# Patient Record
Sex: MALE | Race: WHITE | NOT HISPANIC OR LATINO | Employment: OTHER | URBAN - METROPOLITAN AREA
[De-identification: names, ages, dates, MRNs, and addresses within clinical notes are randomized per-mention and may not be internally consistent; named-entity substitution may affect disease eponyms.]

---

## 2024-09-02 ENCOUNTER — HOSPITAL ENCOUNTER (EMERGENCY)
Facility: HOSPITAL | Age: 84
Discharge: OTHER INSTITUTION | End: 2024-09-03
Payer: MEDICARE

## 2024-09-02 ENCOUNTER — HOSPITAL ENCOUNTER (INPATIENT)
Facility: HOSPITAL | Age: 84
LOS: 6 days | Discharge: NURSING, REHAB OR LONG-TERM CARE | End: 2024-09-08
Attending: EMERGENCY MEDICINE | Admitting: NURSE PRACTITIONER
Payer: MEDICARE

## 2024-09-02 ENCOUNTER — APPOINTMENT (OUTPATIENT)
Dept: RADIOLOGY | Facility: HOSPITAL | Age: 84
DRG: 065 | End: 2024-09-02
Payer: MEDICARE

## 2024-09-02 ENCOUNTER — APPOINTMENT (OUTPATIENT)
Dept: RADIOLOGY | Facility: HOSPITAL | Age: 84
End: 2024-09-02
Payer: MEDICARE

## 2024-09-02 DIAGNOSIS — Z01.89 ENCOUNTER FOR OTHER SPECIFIED SPECIAL EXAMINATIONS: ICD-10-CM

## 2024-09-02 DIAGNOSIS — I63.411: Primary | ICD-10-CM

## 2024-09-02 LAB
ALBUMIN SERPL BCP-MCNC: 3.4 G/DL (ref 3.4–5)
ALP SERPL-CCNC: 104 U/L (ref 33–136)
ALT SERPL W P-5'-P-CCNC: 18 U/L (ref 10–52)
ANION GAP SERPL CALC-SCNC: 17 MMOL/L (ref 10–20)
APTT PPP: 29 SECONDS (ref 27–38)
AST SERPL W P-5'-P-CCNC: 14 U/L (ref 9–39)
BASOPHILS # BLD AUTO: 0.02 X10*3/UL (ref 0–0.1)
BASOPHILS NFR BLD AUTO: 0.2 %
BILIRUB SERPL-MCNC: 0.3 MG/DL (ref 0–1.2)
BNP SERPL-MCNC: 281 PG/ML (ref 0–99)
BUN SERPL-MCNC: 60 MG/DL (ref 6–23)
CALCIUM SERPL-MCNC: 8.8 MG/DL (ref 8.6–10.3)
CARDIAC TROPONIN I PNL SERPL HS: 143 NG/L (ref 0–20)
CARDIAC TROPONIN I PNL SERPL HS: 144 NG/L (ref 0–20)
CHLORIDE SERPL-SCNC: 108 MMOL/L (ref 98–107)
CHOLEST SERPL-MCNC: 89 MG/DL (ref 0–199)
CHOLESTEROL/HDL RATIO: 2
CO2 SERPL-SCNC: 18 MMOL/L (ref 21–32)
CREAT SERPL-MCNC: 3.07 MG/DL (ref 0.5–1.3)
EGFRCR SERPLBLD CKD-EPI 2021: 19 ML/MIN/1.73M*2
EOSINOPHIL # BLD AUTO: 0.19 X10*3/UL (ref 0–0.4)
EOSINOPHIL NFR BLD AUTO: 2.3 %
ERYTHROCYTE [DISTWIDTH] IN BLOOD BY AUTOMATED COUNT: 16.2 % (ref 11.5–14.5)
GLUCOSE BLD MANUAL STRIP-MCNC: 154 MG/DL (ref 74–99)
GLUCOSE BLD MANUAL STRIP-MCNC: 219 MG/DL (ref 74–99)
GLUCOSE SERPL-MCNC: 209 MG/DL (ref 74–99)
HCT VFR BLD AUTO: 24.5 % (ref 41–52)
HDLC SERPL-MCNC: 45.5 MG/DL
HGB BLD-MCNC: 7.7 G/DL (ref 13.5–17.5)
HOLD SPECIMEN: NORMAL
HOLD SPECIMEN: NORMAL
IMM GRANULOCYTES # BLD AUTO: 0.03 X10*3/UL (ref 0–0.5)
IMM GRANULOCYTES NFR BLD AUTO: 0.4 % (ref 0–0.9)
INR PPP: 1.1 (ref 0.9–1.1)
LDLC SERPL CALC-MCNC: 33 MG/DL
LYMPHOCYTES # BLD AUTO: 1.24 X10*3/UL (ref 0.8–3)
LYMPHOCYTES NFR BLD AUTO: 15.3 %
MCH RBC QN AUTO: 31.6 PG (ref 26–34)
MCHC RBC AUTO-ENTMCNC: 31.4 G/DL (ref 32–36)
MCV RBC AUTO: 100 FL (ref 80–100)
MONOCYTES # BLD AUTO: 0.55 X10*3/UL (ref 0.05–0.8)
MONOCYTES NFR BLD AUTO: 6.8 %
NEUTROPHILS # BLD AUTO: 6.09 X10*3/UL (ref 1.6–5.5)
NEUTROPHILS NFR BLD AUTO: 75 %
NON HDL CHOLESTEROL: 44 MG/DL (ref 0–149)
NRBC BLD-RTO: 0 /100 WBCS (ref 0–0)
PLATELET # BLD AUTO: 190 X10*3/UL (ref 150–450)
POTASSIUM SERPL-SCNC: 4.1 MMOL/L (ref 3.5–5.3)
PROT SERPL-MCNC: 5.9 G/DL (ref 6.4–8.2)
PROTHROMBIN TIME: 12.3 SECONDS (ref 9.8–12.8)
RBC # BLD AUTO: 2.44 X10*6/UL (ref 4.5–5.9)
SODIUM SERPL-SCNC: 139 MMOL/L (ref 136–145)
TRIGL SERPL-MCNC: 54 MG/DL (ref 0–149)
VLDL: 11 MG/DL (ref 0–40)
WBC # BLD AUTO: 8.1 X10*3/UL (ref 4.4–11.3)

## 2024-09-02 PROCEDURE — 70498 CT ANGIOGRAPHY NECK: CPT | Performed by: STUDENT IN AN ORGANIZED HEALTH CARE EDUCATION/TRAINING PROGRAM

## 2024-09-02 PROCEDURE — 93005 ELECTROCARDIOGRAM TRACING: CPT

## 2024-09-02 PROCEDURE — 82947 ASSAY GLUCOSE BLOOD QUANT: CPT

## 2024-09-02 PROCEDURE — 84484 ASSAY OF TROPONIN QUANT: CPT | Performed by: EMERGENCY MEDICINE

## 2024-09-02 PROCEDURE — 85025 COMPLETE CBC W/AUTO DIFF WBC: CPT | Performed by: EMERGENCY MEDICINE

## 2024-09-02 PROCEDURE — 71045 X-RAY EXAM CHEST 1 VIEW: CPT

## 2024-09-02 PROCEDURE — 80053 COMPREHEN METABOLIC PANEL: CPT | Performed by: EMERGENCY MEDICINE

## 2024-09-02 PROCEDURE — 80061 LIPID PANEL: CPT | Performed by: NURSE PRACTITIONER

## 2024-09-02 PROCEDURE — 70450 CT HEAD/BRAIN W/O DYE: CPT

## 2024-09-02 PROCEDURE — 2550000001 HC RX 255 CONTRASTS: Performed by: EMERGENCY MEDICINE

## 2024-09-02 PROCEDURE — 2060000001 HC INTERMEDIATE ICU ROOM DAILY

## 2024-09-02 PROCEDURE — 99223 1ST HOSP IP/OBS HIGH 75: CPT | Performed by: NURSE PRACTITIONER

## 2024-09-02 PROCEDURE — 36415 COLL VENOUS BLD VENIPUNCTURE: CPT | Performed by: EMERGENCY MEDICINE

## 2024-09-02 PROCEDURE — 70496 CT ANGIOGRAPHY HEAD: CPT | Performed by: STUDENT IN AN ORGANIZED HEALTH CARE EDUCATION/TRAINING PROGRAM

## 2024-09-02 PROCEDURE — 84484 ASSAY OF TROPONIN QUANT: CPT | Performed by: NURSE PRACTITIONER

## 2024-09-02 PROCEDURE — 83036 HEMOGLOBIN GLYCOSYLATED A1C: CPT | Performed by: NURSE PRACTITIONER

## 2024-09-02 PROCEDURE — 83880 ASSAY OF NATRIURETIC PEPTIDE: CPT | Performed by: NURSE PRACTITIONER

## 2024-09-02 PROCEDURE — 2500000004 HC RX 250 GENERAL PHARMACY W/ HCPCS (ALT 636 FOR OP/ED): Performed by: NURSE PRACTITIONER

## 2024-09-02 PROCEDURE — 85610 PROTHROMBIN TIME: CPT | Performed by: EMERGENCY MEDICINE

## 2024-09-02 PROCEDURE — 36415 COLL VENOUS BLD VENIPUNCTURE: CPT | Performed by: NURSE PRACTITIONER

## 2024-09-02 PROCEDURE — 85730 THROMBOPLASTIN TIME PARTIAL: CPT | Performed by: EMERGENCY MEDICINE

## 2024-09-02 PROCEDURE — 2500000004 HC RX 250 GENERAL PHARMACY W/ HCPCS (ALT 636 FOR OP/ED): Performed by: EMERGENCY MEDICINE

## 2024-09-02 PROCEDURE — 71045 X-RAY EXAM CHEST 1 VIEW: CPT | Mod: FOREIGN READ | Performed by: RADIOLOGY

## 2024-09-02 PROCEDURE — 99291 CRITICAL CARE FIRST HOUR: CPT | Performed by: EMERGENCY MEDICINE

## 2024-09-02 PROCEDURE — 70498 CT ANGIOGRAPHY NECK: CPT

## 2024-09-02 PROCEDURE — 4500999001 HC ED NO CHARGE

## 2024-09-02 RX ORDER — PANTOPRAZOLE SODIUM 40 MG/10ML
40 INJECTION, POWDER, LYOPHILIZED, FOR SOLUTION INTRAVENOUS DAILY
Status: DISCONTINUED | OUTPATIENT
Start: 2024-09-03 | End: 2024-09-08 | Stop reason: HOSPADM

## 2024-09-02 RX ORDER — DEXTROSE 50 % IN WATER (D50W) INTRAVENOUS SYRINGE
12.5
Status: DISCONTINUED | OUTPATIENT
Start: 2024-09-02 | End: 2024-09-08 | Stop reason: HOSPADM

## 2024-09-02 RX ORDER — INSULIN LISPRO 100 [IU]/ML
0-10 INJECTION, SOLUTION INTRAVENOUS; SUBCUTANEOUS EVERY 4 HOURS
Status: DISCONTINUED | OUTPATIENT
Start: 2024-09-02 | End: 2024-09-05

## 2024-09-02 RX ORDER — ATORVASTATIN CALCIUM 80 MG/1
80 TABLET, FILM COATED ORAL NIGHTLY
Status: DISCONTINUED | OUTPATIENT
Start: 2024-09-02 | End: 2024-09-08 | Stop reason: HOSPADM

## 2024-09-02 RX ORDER — TAMSULOSIN HYDROCHLORIDE 0.4 MG/1
0.4 CAPSULE ORAL DAILY
Status: CANCELLED | OUTPATIENT
Start: 2024-09-03

## 2024-09-02 RX ORDER — AMLODIPINE BESYLATE 2.5 MG/1
2.5 TABLET ORAL DAILY
Status: ON HOLD | COMMUNITY
End: 2024-09-08 | Stop reason: ALTCHOICE

## 2024-09-02 RX ORDER — ALLOPURINOL 300 MG/1
300 TABLET ORAL DAILY
Status: ON HOLD | COMMUNITY
End: 2024-09-08 | Stop reason: ALTCHOICE

## 2024-09-02 RX ORDER — HYDROCHLOROTHIAZIDE 12.5 MG/1
12.5 CAPSULE ORAL DAILY
Status: ON HOLD | COMMUNITY
End: 2024-09-08 | Stop reason: ALTCHOICE

## 2024-09-02 RX ORDER — TAMSULOSIN HYDROCHLORIDE 0.4 MG/1
0.4 CAPSULE ORAL DAILY
Status: ON HOLD | COMMUNITY

## 2024-09-02 RX ORDER — SODIUM CHLORIDE 9 MG/ML
50 INJECTION, SOLUTION INTRAVENOUS CONTINUOUS
Status: ACTIVE | OUTPATIENT
Start: 2024-09-02 | End: 2024-09-03

## 2024-09-02 RX ORDER — OLMESARTAN MEDOXOMIL 40 MG/1
40 TABLET ORAL NIGHTLY
Status: ON HOLD | COMMUNITY
End: 2024-09-08 | Stop reason: ALTCHOICE

## 2024-09-02 RX ORDER — METOPROLOL TARTRATE 1 MG/ML
5 INJECTION, SOLUTION INTRAVENOUS ONCE AS NEEDED
Status: DISCONTINUED | OUTPATIENT
Start: 2024-09-02 | End: 2024-09-08 | Stop reason: HOSPADM

## 2024-09-02 ASSESSMENT — PAIN - FUNCTIONAL ASSESSMENT
PAIN_FUNCTIONAL_ASSESSMENT: 0-10
PAIN_FUNCTIONAL_ASSESSMENT: 0-10

## 2024-09-02 ASSESSMENT — LIFESTYLE VARIABLES
HAVE PEOPLE ANNOYED YOU BY CRITICIZING YOUR DRINKING: NO
EVER HAD A DRINK FIRST THING IN THE MORNING TO STEADY YOUR NERVES TO GET RID OF A HANGOVER: NO
HAVE YOU EVER FELT YOU SHOULD CUT DOWN ON YOUR DRINKING: NO
TOTAL SCORE: 0
EVER FELT BAD OR GUILTY ABOUT YOUR DRINKING: NO

## 2024-09-02 ASSESSMENT — PAIN SCALES - GENERAL
PAINLEVEL_OUTOF10: 0 - NO PAIN
PAINLEVEL_OUTOF10: 0 - NO PAIN

## 2024-09-02 ASSESSMENT — COLUMBIA-SUICIDE SEVERITY RATING SCALE - C-SSRS
1. IN THE PAST MONTH, HAVE YOU WISHED YOU WERE DEAD OR WISHED YOU COULD GO TO SLEEP AND NOT WAKE UP?: NO
2. HAVE YOU ACTUALLY HAD ANY THOUGHTS OF KILLING YOURSELF?: NO
6. HAVE YOU EVER DONE ANYTHING, STARTED TO DO ANYTHING, OR PREPARED TO DO ANYTHING TO END YOUR LIFE?: NO

## 2024-09-03 ENCOUNTER — APPOINTMENT (OUTPATIENT)
Dept: CARDIOLOGY | Facility: HOSPITAL | Age: 84
DRG: 065 | End: 2024-09-03
Payer: MEDICARE

## 2024-09-03 ENCOUNTER — APPOINTMENT (OUTPATIENT)
Dept: RADIOLOGY | Facility: HOSPITAL | Age: 84
DRG: 065 | End: 2024-09-03
Payer: MEDICARE

## 2024-09-03 ENCOUNTER — APPOINTMENT (OUTPATIENT)
Dept: CARDIOLOGY | Facility: HOSPITAL | Age: 84
End: 2024-09-03
Payer: MEDICARE

## 2024-09-03 LAB
AMORPH CRY #/AREA UR COMP ASSIST: ABNORMAL /HPF
ANION GAP SERPL CALC-SCNC: 13 MMOL/L (ref 10–20)
AORTIC VALVE MEAN GRADIENT: 5 MMHG
AORTIC VALVE PEAK VELOCITY: 1.39 M/S
APPEARANCE UR: ABNORMAL
ATRIAL RATE: 0 BPM
AV PEAK GRADIENT: 7.7 MMHG
AVA (PEAK VEL): 1.55 CM2
AVA (VTI): 1.59 CM2
BACTERIA #/AREA URNS AUTO: ABNORMAL /HPF
BILIRUB UR STRIP.AUTO-MCNC: NEGATIVE MG/DL
BUN SERPL-MCNC: 57 MG/DL (ref 6–23)
CALCIUM SERPL-MCNC: 9 MG/DL (ref 8.6–10.3)
CARDIAC TROPONIN I PNL SERPL HS: 139 NG/L (ref 0–20)
CHLORIDE SERPL-SCNC: 111 MMOL/L (ref 98–107)
CO2 SERPL-SCNC: 20 MMOL/L (ref 21–32)
COLOR UR: COLORLESS
CREAT SERPL-MCNC: 2.67 MG/DL (ref 0.5–1.3)
EGFRCR SERPLBLD CKD-EPI 2021: 23 ML/MIN/1.73M*2
EJECTION FRACTION APICAL 4 CHAMBER: 43.6
EJECTION FRACTION: 68 %
ERYTHROCYTE [DISTWIDTH] IN BLOOD BY AUTOMATED COUNT: 16.1 % (ref 11.5–14.5)
EST. AVERAGE GLUCOSE BLD GHB EST-MCNC: 117 MG/DL
GLUCOSE BLD MANUAL STRIP-MCNC: 120 MG/DL (ref 74–99)
GLUCOSE BLD MANUAL STRIP-MCNC: 122 MG/DL (ref 74–99)
GLUCOSE BLD MANUAL STRIP-MCNC: 124 MG/DL (ref 74–99)
GLUCOSE BLD MANUAL STRIP-MCNC: 125 MG/DL (ref 74–99)
GLUCOSE BLD MANUAL STRIP-MCNC: 126 MG/DL (ref 74–99)
GLUCOSE BLD MANUAL STRIP-MCNC: 148 MG/DL (ref 74–99)
GLUCOSE BLD MANUAL STRIP-MCNC: 152 MG/DL (ref 74–99)
GLUCOSE SERPL-MCNC: 125 MG/DL (ref 74–99)
GLUCOSE UR STRIP.AUTO-MCNC: NORMAL MG/DL
HBA1C MFR BLD: 5.7 %
HCT VFR BLD AUTO: 24.8 % (ref 41–52)
HGB BLD-MCNC: 7.9 G/DL (ref 13.5–17.5)
HOLD SPECIMEN: NORMAL
KETONES UR STRIP.AUTO-MCNC: NEGATIVE MG/DL
LEFT ATRIUM VOLUME AREA LENGTH INDEX BSA: 45.9 ML/M2
LEFT VENTRICLE INTERNAL DIMENSION DIASTOLE: 4.3 CM (ref 3.5–6)
LEFT VENTRICULAR OUTFLOW TRACT DIAMETER: 1.8 CM
LEUKOCYTE ESTERASE UR QL STRIP.AUTO: ABNORMAL
MCH RBC QN AUTO: 31.7 PG (ref 26–34)
MCHC RBC AUTO-ENTMCNC: 31.9 G/DL (ref 32–36)
MCV RBC AUTO: 100 FL (ref 80–100)
MUCOUS THREADS #/AREA URNS AUTO: ABNORMAL /LPF
NITRITE UR QL STRIP.AUTO: NEGATIVE
NRBC BLD-RTO: 0 /100 WBCS (ref 0–0)
PH UR STRIP.AUTO: 5.5 [PH]
PLATELET # BLD AUTO: 193 X10*3/UL (ref 150–450)
POTASSIUM SERPL-SCNC: 4.2 MMOL/L (ref 3.5–5.3)
PR INTERVAL: 153 MS
PROT UR STRIP.AUTO-MCNC: ABNORMAL MG/DL
Q ONSET: 249 MS
QRS COUNT: 11 BEATS
QRS DURATION: 116 MS
QT INTERVAL: 442 MS
QTC CALCULATION(BAZETT): 474 MS
QTC FREDERICIA: 463 MS
R AXIS: -85 DEGREES
RBC # BLD AUTO: 2.49 X10*6/UL (ref 4.5–5.9)
RBC # UR STRIP.AUTO: ABNORMAL /UL
RBC #/AREA URNS AUTO: >20 /HPF
RIGHT VENTRICLE FREE WALL PEAK S': 7.18 CM/S
RIGHT VENTRICLE PEAK SYSTOLIC PRESSURE: 36.7 MMHG
SODIUM SERPL-SCNC: 140 MMOL/L (ref 136–145)
SP GR UR STRIP.AUTO: 1.02
T AXIS: 98 DEGREES
T OFFSET: 470 MS
TRICUSPID ANNULAR PLANE SYSTOLIC EXCURSION: 1.4 CM
UROBILINOGEN UR STRIP.AUTO-MCNC: NORMAL MG/DL
VENTRICULAR RATE: 69 BPM
WBC # BLD AUTO: 8.4 X10*3/UL (ref 4.4–11.3)
WBC #/AREA URNS AUTO: >50 /HPF
WBC CLUMPS #/AREA URNS AUTO: ABNORMAL /HPF

## 2024-09-03 PROCEDURE — 97161 PT EVAL LOW COMPLEX 20 MIN: CPT | Mod: GP

## 2024-09-03 PROCEDURE — 36415 COLL VENOUS BLD VENIPUNCTURE: CPT | Performed by: INTERNAL MEDICINE

## 2024-09-03 PROCEDURE — 80048 BASIC METABOLIC PNL TOTAL CA: CPT | Performed by: INTERNAL MEDICINE

## 2024-09-03 PROCEDURE — 93306 TTE W/DOPPLER COMPLETE: CPT

## 2024-09-03 PROCEDURE — 99223 1ST HOSP IP/OBS HIGH 75: CPT | Performed by: PSYCHIATRY & NEUROLOGY

## 2024-09-03 PROCEDURE — 2500000001 HC RX 250 WO HCPCS SELF ADMINISTERED DRUGS (ALT 637 FOR MEDICARE OP): Performed by: PSYCHIATRY & NEUROLOGY

## 2024-09-03 PROCEDURE — 97165 OT EVAL LOW COMPLEX 30 MIN: CPT | Mod: GO

## 2024-09-03 PROCEDURE — 81001 URINALYSIS AUTO W/SCOPE: CPT | Performed by: NURSE PRACTITIONER

## 2024-09-03 PROCEDURE — 84484 ASSAY OF TROPONIN QUANT: CPT | Performed by: NURSE PRACTITIONER

## 2024-09-03 PROCEDURE — 85027 COMPLETE CBC AUTOMATED: CPT | Performed by: INTERNAL MEDICINE

## 2024-09-03 PROCEDURE — 70551 MRI BRAIN STEM W/O DYE: CPT | Performed by: RADIOLOGY

## 2024-09-03 PROCEDURE — 2500000004 HC RX 250 GENERAL PHARMACY W/ HCPCS (ALT 636 FOR OP/ED): Performed by: NURSE PRACTITIONER

## 2024-09-03 PROCEDURE — 70551 MRI BRAIN STEM W/O DYE: CPT

## 2024-09-03 PROCEDURE — 51701 INSERT BLADDER CATHETER: CPT

## 2024-09-03 PROCEDURE — 82947 ASSAY GLUCOSE BLOOD QUANT: CPT

## 2024-09-03 PROCEDURE — 2500000002 HC RX 250 W HCPCS SELF ADMINISTERED DRUGS (ALT 637 FOR MEDICARE OP, ALT 636 FOR OP/ED): Performed by: NURSE PRACTITIONER

## 2024-09-03 PROCEDURE — 99222 1ST HOSP IP/OBS MODERATE 55: CPT

## 2024-09-03 PROCEDURE — 2500000004 HC RX 250 GENERAL PHARMACY W/ HCPCS (ALT 636 FOR OP/ED)

## 2024-09-03 PROCEDURE — 93306 TTE W/DOPPLER COMPLETE: CPT | Performed by: STUDENT IN AN ORGANIZED HEALTH CARE EDUCATION/TRAINING PROGRAM

## 2024-09-03 PROCEDURE — 36415 COLL VENOUS BLD VENIPUNCTURE: CPT | Performed by: NURSE PRACTITIONER

## 2024-09-03 PROCEDURE — 2060000001 HC INTERMEDIATE ICU ROOM DAILY

## 2024-09-03 PROCEDURE — 92610 EVALUATE SWALLOWING FUNCTION: CPT | Mod: GN | Performed by: SPEECH-LANGUAGE PATHOLOGIST

## 2024-09-03 RX ORDER — ASPIRIN 300 MG/1
150 SUPPOSITORY RECTAL DAILY
Status: DISCONTINUED | OUTPATIENT
Start: 2024-09-03 | End: 2024-09-05

## 2024-09-03 RX ORDER — TAMSULOSIN HYDROCHLORIDE 0.4 MG/1
0.4 CAPSULE ORAL DAILY
Status: DISCONTINUED | OUTPATIENT
Start: 2024-09-03 | End: 2024-09-08 | Stop reason: HOSPADM

## 2024-09-03 RX ORDER — SODIUM CHLORIDE 9 MG/ML
50 INJECTION, SOLUTION INTRAVENOUS CONTINUOUS
Status: DISCONTINUED | OUTPATIENT
Start: 2024-09-03 | End: 2024-09-05

## 2024-09-03 RX ORDER — CEFTRIAXONE 1 G/50ML
1 INJECTION, SOLUTION INTRAVENOUS EVERY 24 HOURS
Status: DISCONTINUED | OUTPATIENT
Start: 2024-09-03 | End: 2024-09-08 | Stop reason: HOSPADM

## 2024-09-03 RX ORDER — ACETAMINOPHEN 650 MG/1
650 SUPPOSITORY RECTAL EVERY 6 HOURS PRN
Status: DISCONTINUED | OUTPATIENT
Start: 2024-09-03 | End: 2024-09-08 | Stop reason: HOSPADM

## 2024-09-03 SDOH — SOCIAL STABILITY: SOCIAL INSECURITY: WERE YOU ABLE TO COMPLETE ALL THE BEHAVIORAL HEALTH SCREENINGS?: YES

## 2024-09-03 SDOH — SOCIAL STABILITY: SOCIAL INSECURITY: ABUSE: ADULT

## 2024-09-03 SDOH — SOCIAL STABILITY: SOCIAL INSECURITY: HAVE YOU HAD ANY THOUGHTS OF HARMING ANYONE ELSE?: UNABLE TO ASSESS

## 2024-09-03 SDOH — SOCIAL STABILITY: SOCIAL INSECURITY: HAS ANYONE EVER THREATENED TO HURT YOUR FAMILY OR YOUR PETS?: UNABLE TO ASSESS

## 2024-09-03 SDOH — SOCIAL STABILITY: SOCIAL INSECURITY: DOES ANYONE TRY TO KEEP YOU FROM HAVING/CONTACTING OTHER FRIENDS OR DOING THINGS OUTSIDE YOUR HOME?: UNABLE TO ASSESS

## 2024-09-03 SDOH — SOCIAL STABILITY: SOCIAL INSECURITY: HAVE YOU HAD THOUGHTS OF HARMING ANYONE ELSE?: UNABLE TO ASSESS

## 2024-09-03 SDOH — SOCIAL STABILITY: SOCIAL INSECURITY: DO YOU FEEL ANYONE HAS EXPLOITED OR TAKEN ADVANTAGE OF YOU FINANCIALLY OR OF YOUR PERSONAL PROPERTY?: UNABLE TO ASSESS

## 2024-09-03 SDOH — SOCIAL STABILITY: SOCIAL INSECURITY: DO YOU FEEL UNSAFE GOING BACK TO THE PLACE WHERE YOU ARE LIVING?: UNABLE TO ASSESS

## 2024-09-03 SDOH — SOCIAL STABILITY: SOCIAL INSECURITY: ARE YOU OR HAVE YOU BEEN THREATENED OR ABUSED PHYSICALLY, EMOTIONALLY, OR SEXUALLY BY ANYONE?: UNABLE TO ASSESS

## 2024-09-03 SDOH — SOCIAL STABILITY: SOCIAL INSECURITY: ARE THERE ANY APPARENT SIGNS OF INJURIES/BEHAVIORS THAT COULD BE RELATED TO ABUSE/NEGLECT?: UNABLE TO ASSESS

## 2024-09-03 ASSESSMENT — ACTIVITIES OF DAILY LIVING (ADL)
DRESSING YOURSELF: DEPENDENT
LACK_OF_TRANSPORTATION: NO
HEARING - RIGHT EAR: FUNCTIONAL
TOILETING: DEPENDENT
FEEDING YOURSELF: DEPENDENT
BATHING: DEPENDENT
HEARING - LEFT EAR: FUNCTIONAL
ASSISTIVE_DEVICE: WALKER;DENTURES UPPER
WALKS IN HOME: DEPENDENT
ADEQUATE_TO_COMPLETE_ADL: YES
GROOMING: DEPENDENT
JUDGMENT_ADEQUATE_SAFELY_COMPLETE_DAILY_ACTIVITIES: NO
PATIENT'S MEMORY ADEQUATE TO SAFELY COMPLETE DAILY ACTIVITIES?: NO

## 2024-09-03 ASSESSMENT — COGNITIVE AND FUNCTIONAL STATUS - GENERAL
PERSONAL GROOMING: A LOT
STANDING UP FROM CHAIR USING ARMS: A LOT
TURNING FROM BACK TO SIDE WHILE IN FLAT BAD: TOTAL
HELP NEEDED FOR BATHING: TOTAL
PATIENT BASELINE BEDBOUND: NO
HELP NEEDED FOR BATHING: TOTAL
MOBILITY SCORE: 9
CLIMB 3 TO 5 STEPS WITH RAILING: TOTAL
STANDING UP FROM CHAIR USING ARMS: TOTAL
PERSONAL GROOMING: TOTAL
MOVING TO AND FROM BED TO CHAIR: TOTAL
STANDING UP FROM CHAIR USING ARMS: TOTAL
DRESSING REGULAR LOWER BODY CLOTHING: TOTAL
DRESSING REGULAR LOWER BODY CLOTHING: TOTAL
TURNING FROM BACK TO SIDE WHILE IN FLAT BAD: TOTAL
DRESSING REGULAR UPPER BODY CLOTHING: TOTAL
CLIMB 3 TO 5 STEPS WITH RAILING: TOTAL
DRESSING REGULAR UPPER BODY CLOTHING: TOTAL
TOILETING: TOTAL
PERSONAL GROOMING: TOTAL
EATING MEALS: TOTAL
DRESSING REGULAR LOWER BODY CLOTHING: TOTAL
EATING MEALS: TOTAL
MOBILITY SCORE: 7
TOILETING: TOTAL
CLIMB 3 TO 5 STEPS WITH RAILING: TOTAL
MOVING FROM LYING ON BACK TO SITTING ON SIDE OF FLAT BED WITH BEDRAILS: A LOT
WALKING IN HOSPITAL ROOM: TOTAL
MOBILITY SCORE: 9
TURNING FROM BACK TO SIDE WHILE IN FLAT BAD: A LOT
MOVING FROM LYING ON BACK TO SITTING ON SIDE OF FLAT BED WITH BEDRAILS: A LOT
MOVING TO AND FROM BED TO CHAIR: TOTAL
WALKING IN HOSPITAL ROOM: TOTAL
TOILETING: TOTAL
DAILY ACTIVITIY SCORE: 8
MOVING TO AND FROM BED TO CHAIR: TOTAL
WALKING IN HOSPITAL ROOM: TOTAL
DRESSING REGULAR UPPER BODY CLOTHING: TOTAL
DAILY ACTIVITIY SCORE: 6
DAILY ACTIVITIY SCORE: 6
HELP NEEDED FOR BATHING: TOTAL
EATING MEALS: A LOT

## 2024-09-03 ASSESSMENT — LIFESTYLE VARIABLES
HOW MANY STANDARD DRINKS CONTAINING ALCOHOL DO YOU HAVE ON A TYPICAL DAY: PATIENT DOES NOT DRINK
AUDIT-C TOTAL SCORE: 0
SKIP TO QUESTIONS 9-10: 1
AUDIT-C TOTAL SCORE: 0
HOW OFTEN DO YOU HAVE A DRINK CONTAINING ALCOHOL: NEVER
HOW OFTEN DO YOU HAVE 6 OR MORE DRINKS ON ONE OCCASION: NEVER

## 2024-09-03 ASSESSMENT — PATIENT HEALTH QUESTIONNAIRE - PHQ9
2. FEELING DOWN, DEPRESSED OR HOPELESS: NOT AT ALL
1. LITTLE INTEREST OR PLEASURE IN DOING THINGS: NOT AT ALL
SUM OF ALL RESPONSES TO PHQ9 QUESTIONS 1 & 2: 0

## 2024-09-03 ASSESSMENT — ENCOUNTER SYMPTOMS
SPEECH DIFFICULTY: 1
WEAKNESS: 1

## 2024-09-03 ASSESSMENT — PAIN SCALES - GENERAL
PAINLEVEL_OUTOF10: 0 - NO PAIN

## 2024-09-03 ASSESSMENT — PAIN - FUNCTIONAL ASSESSMENT
PAIN_FUNCTIONAL_ASSESSMENT: 0-10

## 2024-09-03 NOTE — ED NOTES
Pt flight cancelled due to interventionalist stating he was no longer a candidate  Too high risk     Cornelio Ramos RN  09/02/24 [id number]

## 2024-09-03 NOTE — CONSULTS
Inpatient consult to Gastroenterology  Consult performed by: Gurwinder Yeung DO  Consult ordered by: Laly Rodriguez DO  Reason for consult: Recent GI bleed, need to start antiplatelet versus anticoagulation      Reason For Consult  Recent GI bleed, needs to start antiplatelet versus anticoagulation    History Of Present Illness  Regulo Marques is a 83 y.o. male presenting with strokelike symptoms.    CARLO Marques is a 80 year old Male with a past medical history of A-fib, colon cancer s/p colectomy, multiple small bowel obstructions, prostate cancer, unresectable rectal cancer s/p chemo and radiation therapy, diverticulitis, diabetes, hypertension, gout who presents to [facility] ED with strokelike symptoms.  Of note, he was admitted to the [facility] ED 8/12/2024 for with an upper GI bleed with hemoglobin of 6.1 requiring EGD and multiple transfusions.  Because of this he was taken off his Eliquis.  Follows with Dr. ALEXUS MARINELLI for outpatient GI.    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  Last colonoscopy 2014 shows Transverse colon polyp with tubular adenoma  Descending colon polyps with colonic mucosa with lymphoid aggregate and hyperplastic changes     Past Medical History  He has a past medical history of Diverticulitis of intestine, part unspecified, without perforation or abscess without bleeding, Personal history of malignant neoplasm, unspecified, Personal history of other diseases of the circulatory system, Personal history of other diseases of the digestive system, Personal history of other endocrine, nutritional and metabolic disease, Personal history of other malignant neoplasm of large intestine, and Personal history of other specified conditions.    Surgical History  He has a past surgical history that includes Colonoscopy (10/20/2016); Total hip arthroplasty (10/20/2016); Tonsillectomy (10/20/2016); Other surgical history (11/08/2019); and Other  surgical history (11/08/2019).     Social History  He reports that he has quit smoking. His smoking use included cigarettes. He has never used smokeless tobacco. He reports that he does not drink alcohol and does not use drugs.    Family History  No family history on file.     Allergies  Codeine    Review of Systems  See HPI     Physical Exam  Gen: Not in acute distress  Head/Neck: Atraumatic  Eyes: Anicteric sclerae, noninjected conjunctivae  Nose: No rhinorrhea  Mouth:  MMM  Heart: Regular rate and rhythm w/ no murmurs, rubs, gallops  Lungs: CTAB w/o wheezes, rales, rhonchi, no increased work of breathing  Abdomen: Soft, nontender and nondistended  Musculoskeletal: No deformities  Extremities: No edema.  Neurologic: Acute stroke  Skin: No rashes noted  Psychological: Calm       Last Recorded Vitals  /58   Pulse 57   Temp 36.1 °C (97 °F)   Resp 16   Wt 93.2 kg (205 lb 7.5 oz)   SpO2 98%        Assessment/Plan   Regulo Marques is a 80 year old Male with a past medical history of A-fib, colon cancer s/p colectomy, multiple small bowel obstructions, prostate cancer, unresectable rectal cancer s/p chemo and radiation therapy, diverticulitis, diabetes, hypertension, gout who presents to [facility] ED with strokelike symptoms.  Of note, he was admitted to the [facility] ED 8/12/2024 for with an upper GI bleed with hemoglobin of 6.1 requiring EGD and multiple transfusions.  Because of this he was taken off his Eliquis.  GI team was consulted regarding restarting his antiplatelet and anticoagulation therapy in setting of recent GI bleed.    Acute CVA  Suspected recent upper GI bleed 8/12/2024  Hankins's esophagus  Gastritis with no focal ulcers  -Patient's Eliquis was stopped 8/12/2024 in setting of recent upper GI bleed requiring multiple transfusions.  Endoscopy during admission showed Hankins's esophagus and gastritis with no focal ulcers.  -Per medical note on 8/12/2024, patient was being considered for Watchman device  outpatient  -In setting of CVA this admission and recent GI bleed with hemoglobin at 7.7 with baseline around 15, will hold anticoagulation but continue with aspirin.  -Discussed with neurology consult risks versus benefits of restarting anticoagulation given patient's suspect recent GI bleed with no source and recent stroke this admission.  Okay to restart anticoagulation as neurology consult sees fit to reduce risk of new stroke.  -Continue PPI 40 mg twice daily    Dr. Gurwinder Yeung   Internal Medicine PGY-1  Available on Pure[employer] for any questions.    This is a preliminary note pending signature from the attending physician.

## 2024-09-03 NOTE — PROGRESS NOTES
Speech-Language Pathology    SLP Adult Inpatient Speech-Language Pathology Clinical Swallow Evaluation    Patient Name: Regulo Marques  MRN: [id number]  Today's Date: 9/3/2024   Time Calculation  Start Time: 0930  Stop Time: 0946  Time Calculation (min): 16 min     Current Problem:   1. 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   Liquid Diet Recommendations: NPO, alternative form of hydration.   Medication Administration Recommendations: Non Oral    Assessment:  Pt presents with mild oral dysphagia, and suspected profound pharyngeal dysphagia given clinical bedside indicators.   Wet vocal quality noted in informal conversation, with severe dysarthric speech.     ORAL PHASE: On oral motor exam, pt has no upper teeth (full upper dentures at bedside), has lower natural dentition.   Saliva contained in mouth, not drooling.   Tongue protrudes to the left.   Labial and lingual ROM/agility is impaired.    Gag reflex absent.      PHARYNGEAL PHASE: Pt reports that he is having trouble swallowing saliva, he does make a strong effort to swallow when he needs to or on command. Laryngeal rise is sluggish vs absent on palpation. Pt does have a strong coughing jag with just thin liquids from moist toothette.   Pt remains 94% room air during session.     Deferred further PO trials given the severity of dysphagia at bedside.   Will plan to re-assess in the next 24 hours, with perhaps spontaneous recovery if any, and determine if [facility].      Dysphagia Goals:  start date 9/3  Patient will participate in re-assessment of swallowing with SLP for possible diet advancement from NPO as indicated.   When appropriate,  patient will participate in Modified Barium Swallow (MBS) study due to oral dysphagia and/or suspected pharyngeal dysphagia.      Pt/caregiver education.     Plan:  Treatment/Interventions: Complete MBSS, Diet recommendations, Assess diet tolerance, Bolus trials, Oral motor exercises, Pharyngeal exercises, Patient/family education  SLP Plan: Skilled SLP  SLP Frequency: 3x per week  Duration: 2 weeks  SLP Discharge Recommendations: Continue skilled speech therapy services post discharge (see subsequent notes for any changes.)  Discussed POC: Patient, Nursing  Discussed Risks/Benefits: Yes, Patient, Nursing  Patient/Caregiver Agreeable: Yes (pt feels that he cannot swallow well.)    Subjective   Pt seen bedside, this SLP and student ST reposition pt fully upright. He is pleasant, cooperative, following commands, makes eye contact, severe dysarthria.  Pt remains 94% room air throughout this session.   MRI pending.     Per H&P  Regulo Marques is a 83 y.o. male presenting to the ER with acute onset of left sided weakness, expressive aphasia, dysarthria, mental status change, and slurred speech. Pt. Was out to dinner with his wife tonight when these symptoms developed. He was apparently in his normal state of health prior to dinner. He was taking Eliquis for A-Fib but was taken off of the Eliquis on 8/12/2024 2/2 an admission at [facility] for a GI bleed. Pt. With no prior hx. Of stroke.   Patient was initially to be transferred to [facility] however neurosurgery at [facility] deemed patient to be too unstable for airlift transport. Patient admitted to SDU under the care of Dr. Marcos Adam who will continue to follow.     CT brain 9/2  IMPRESSION:  Acute complete occlusion involving the right M2 inferior division branch. It transiently reconstitutes but then again occludes at the M3 branch point and there is a slight paucity of M4 cortical opacification involving its territory throughout the right parietal and posterior temporal regions.       Otherwise, no hemodynamically significant intracranial or  extracranial stenosis, occlusion, or aneurysm.      Suspect a early loss in the gray-white differentiation of the insula.      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      Moderate-sized left pleural effusion.    CXR 9/2  IMPRESSION:  No acute pulmonary pathology.    General Visit Information:  Patient Class: Inpatient  Living Environment:  (home with wife)  Caregiver Feedback:  (dysarthric, lying on his side for comfort.)  Reason for Referral:  (assess oropharyngeal swallow.)  Prior to Session Communication: Bedside nurse  Base[employer] Diet:  (regular textures, thin liquids at home.)  Current Diet : NPO  Dysphagia Diagnosis: Mild to moderate oral stage dysphagia (suspected profound pharyngeal dysphagia given clinical bedside indicators.)  Baseline Assessment:  Respiratory Status: Room air  Behavior/Cognition: Alert, Cooperative, Pleasant mood  Patient Positioning: Upright in Bed  Baseline Vocal Quality: Wet (audible upper airway secretions, pt reports he is having trouble swallowing saliva.  Amy was hooked up at head of bed during this assessment for pt use.)  Volitional Swallow: Absent  Pain:  Pain Assessment: 0-10  0-10 (Numeric) Pain Score: 0 - No pain   Oral/Motor Assessment:  Dentition: Dentures (Upper Full)  Oral Motor: Impaired Function  Labial Agility: Reduced  Labial ROM: Reduced left  Lingual Agility: Reduced  Lingual ROM:  (poor coordination of lingual movements noted in severe dysarthric speech)  Lingual Symmetry: Abnormal symmetry left  Vocal Quality:  (wet on secretions.)  Gag: Reduced  Intelligibility: Intelligibility reduced  Breath Support: Adequate for speech  Clinical Observations:  Patient Positioning: Upright in Bed  Management of Oral Secretions: Inadequate (amy  utilized, left at bedside for ongoing use.)  Was The 3 oz Swallow Protocol Completed: No (too high risk for aspiration, deferred.)    Inpatient:  Education Documentation  SLP has provided pt and caregiver/RN with the results and recommendations of this session.  Head of bed sign posted in room with NPO recs and amy use to manage secretions/saliva.

## 2024-09-03 NOTE — PROGRESS NOTES
Physical Therapy    Physical Therapy Evaluation    Patient Name: Regulo Marques  MRN: [id number]  Today's Date: 9/3/2024   Time Calculation  Start Time: 1031  Stop Time: 1055  Time Calculation (min): 24 min  824/824-A    Assessment/Plan   PT Assessment  End of Session Communication: Bedside nurse  End of Session Patient Position: Bed, 2 rail up, Alarm on (All needs in reach, no complaints noted, family present)  IP OR SWING BED PT PLAN  Inpatient or Swing Bed: Inpatient  PT Plan  Treatment/Interventions: Bed mobility, Transfer training, Gait training  PT Plan: Ongoing PT  PT Frequency: 5 times per week  PT Discharge Recommendations: High intensity level of continued care  PT - OK to Discharge: Yes    Subjective     Current Problem:  1. 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 male presenting to the ER with acute onset of left sided weakness, expressive aphasia, dysarthria, mental status change, and slurred speech. Pt. Was out to dinner with his wife tonight when these symptoms developed. He was apparently in his normal state of health prior to dinner. He was taking Eliquis for A-Fib but was taken off of the Eliquis on 8/12/2024 2/2 an admission at [facility] for a GI bleed. Pt. With no prior hx. Of stroke.  Co-Treatment: OT  Co-Treatment Reason: maximize safety and functional mobility  Prior to Session Communication: Bedside nurse  Patient Position Received: Bed, 2 rail up, Alarm on (Agreeable to PT, pt's wife and son present during session)    Home Living:  Home Living  Home Living Comments: Pt lives with his wife in a 2 story house with 1  step through side door or 3 steps with HR through front door, 3-in-1 commode on 1st floor, and full flight with HR to bedroom and only bathroom. Bathroom has a tub/shower with a chair and HHSH and a standard height toilet. Pt sleeps in regular bed. (Pt and his wife are moving into an ILF and are planning to move in ASAP.)    Prior Level of Function:  Prior Function Per Pt/Caregiver Report  Level of [place]: Independent with ADLs and functional transfers, Independent with homemaking with ambulation (Pt amb without an AD, is independent with laundry, eat out for meals, (+) driving)    Precautions:  Precautions  Precautions Comment: Fall precautions, L neglect    Objective     Pain:  Pain Assessment  Pain Assessment:  (low back 2/10, repositioned for comfort s/p session)    Cognition:  Cognition  Overall Cognitive Status: Within Functional Limits (pt presenting with dysarthria, delayed processing, requring max VCs for all tasks, L neglect)    General Assessments:  Sensation  Light Touch: No apparent deficits  Strength  Strength Comments: B LE ROM WFL, R LE strength 4-/5, L LE strength 3-/5   Coordination  Coordination Comment: Pt demonstrating L UE and LE neglect UE>LE  Static Standing Balance  Static Standing-Comment/Number of Minutes: Poor with RW and max A x 1 demonstrating severe L lean x 1 min    Functional Assessments:  Bed Mobility  Bed Mobility:  (supine to sitting: max A x 2, sit to supine: dep x 2)  Transfers  Transfer:  (STS from EOB: mod A x 2 and max A x 1 to maintain with severe L lean noted which was unable to correct with cueing)  Ambulation/Gait Training  Ambulation/Gait Training Performed:  (unable to attempt at this time due to high fall risk)    Outcome Measures:  [facility] Basic Mobility  Turning from your back to your side while in a flat bed without using bedrails: A lot  Moving from lying on your back to sitting on the side of a flat bed without using bedrails: A lot  Moving to and from bed to  chair (including a wheelchair): Total  Standing up from a chair using your arms (e.g. wheelchair or bedside chair): A lot  To walk in hospital room: Total  Climbing 3-5 steps with railing: Total  Basic Mobility - Total Score: 9     Goals:  Encounter Problems       Encounter Problems (Active)       PT Problem       STG - Pt will transition supine <> sitting with mod A x 1  (Progressing)       Start:  09/03/24    Expected End:  09/17/24            STG - Pt will transfer STS with mod A x 1  (Progressing)       Start:  09/03/24    Expected End:  09/17/24            STG - Pt will amb 15' using RW with min-mod A x 2  (Progressing)       Start:  09/03/24    Expected End:  09/17/24                 Education Documentation  Precautions, taught by Lucia Johnson PT at 9/3/2024 12:40 PM.  Learner: Patient  Readiness: Acceptance  Method: Explanation  Response: Verbalizes Understanding, Needs Reinforcement    Mobility Training, taught by Lucia Johnson PT at 9/3/2024 12:40 PM.  Learner: Patient  Readiness: Acceptance  Method: Explanation  Response: Verbalizes Understanding, Needs Reinforcement    Education Comments  No comments found.

## 2024-09-03 NOTE — CARE PLAN
Problem: Fall/Injury  Goal: Not fall by end of shift  Outcome: Progressing  Goal: Be free from injury by end of the shift  Outcome: Progressing  Goal: Verbalize understanding of personal risk factors for fall in the hospital  Outcome: Progressing  Goal: Verbalize understanding of risk factor reduction measures to prevent injury from fall in the home  Outcome: Progressing  Goal: Use assistive devices by end of the shift  Outcome: Progressing  Goal: Pace activities to prevent fatigue by end of the shift  Outcome: Progressing   The patient's goals for the shift include      The clinical goals for the shift include Improve Neuro status

## 2024-09-03 NOTE — PROGRESS NOTES
09/03/24 1323   Discharge Planning   Living Arrangements Spouse/significant other   Support Systems Spouse/significant other;Children   Assistance Needed Independent, uses rollator as needed. Patient does drive   Type of Residence Private residence  (2 story home, bedroom upstairs)   Number of Stairs to Enter Residence 3  (3 steps to enter front door, one step to enter into side door then 3 more to get into house)   Number of Stairs Within Residence 14   Home or Post Acute Services Post acute facilities (Rehab/SNF/etc)     Met with patient, patients wife, patients daughter and patients son at bedside. Introduced self and role on care transitions team. Admission assessment completed with patients wife. Address, insurance and contact information verified. Patient lives at home with his wife and their grandson has been staying with them. Patient wife states patient and herself are moving to [facility] in [place] Independent living, apartment will be on one floor, no set move in date yet.   PCP: Dr. Paul Casarez, last visit was one week ago  Pharmacy: [facility], patient able to afford and obtain his home medications.     Addendum 1407: [facility] PT: 9 OT:8, with recommendations for high intensity therapy. Met with patients wife and family at bedside to follow up on discharge plan. Spoke with patient and family regarding acute rehab. Patients wife's preference is [facility].Spoke with Isabelle with [facility], requested for referral to be sent.

## 2024-09-03 NOTE — CONSULTS
Inpatient consult to Neurology  Consult performed by: Laly Rodriguez DO  Consult ordered by: TENZIN Molina-CNP          History Of Present Illness  Regulo Marques is a 83 y.o. male with a history of a. Fib previously on Eliquis, recent GI bleed without source found, HTN, colon cancer presenting with acute onset of expressive aphasia, dysarthia and slurred speech.  Patient was evaluated in the ED. TNK not given with recent history of GI bleed requiring 3 units of blood.  Imaging showed a right M2 cutoff. Initial plan was to take him to [facility] for thrombectomy however the neuro-interventional team felt that he was too high risk and cancelled the transfer.  He continues to have signfiicant dysathria and mild weakness on the left side of his body. Family has not noticed any neglect.     He was found to have a. Fib last November per family. Initially placed on Eliquis 5 mg BID until he developed hematuria.  He had a procedure by urology however continued to have hematuria so it was lowered to 2.5 mg BID. Then was admitted to [facility] for black stools with a Hemoglobin of 6 (per family) and was completely taken off Eliquis on 8/12/2024.     He has no prior history of stroke.     Last known well: 1937 on day of admission  Had stroke symptoms resolved at time of presentation: No  Past Medical History  Past Medical History:   Diagnosis Date    Diverticulitis of intestine, part unspecified, without perforation or abscess without bleeding     Diverticulitis    Personal history of malignant neoplasm, unspecified     History of malignant neoplasm    Personal history of other diseases of the circulatory system     History of hypertension    Personal history of other diseases of the digestive system     History of diverticulitis    Personal history of other endocrine, nutritional and metabolic disease     History of diabetes mellitus    Personal history of other malignant neoplasm of large intestine      History of malignant neoplasm of colon    Personal history of other specified conditions     History of abdominal pain     Surgical History  Past Surgical History:   Procedure Laterality Date    COLONOSCOPY  10/20/2016    Colonoscopy (Fiberoptic)    OTHER SURGICAL HISTORY  11/08/2019    Gallbladder surgery    OTHER SURGICAL HISTORY  11/08/2019    Carpal tunnel surgery    TONSILLECTOMY  10/20/2016    Tonsillectomy    TOTAL HIP ARTHROPLASTY  10/20/2016    Total Hip Replacement     Social History  Social History     Tobacco Use    Smoking status: Former     Types: Cigarettes    Smokeless tobacco: Never   Vaping Use    Vaping status: Never Used   Substance Use Topics    Alcohol use: Never    Drug use: Never     Allergies  Codeine  Home Medications  Medications Prior to Admission   Medication Sig Dispense Refill Last Dose    allopurinol (Zyloprim) 300 mg tablet Take 1 tablet (300 mg) by mouth once daily.   Unknown    amLODIPine (Norvasc) 2.5 mg tablet Take 1 tablet (2.5 mg) by mouth once daily.   Unknown    hydroCHLOROthiazide (Microzide) 12.5 mg capsule Take 1 capsule (12.5 mg) by mouth once daily.   Unknown    olmesartan (BENIcar) 40 mg tablet Take 1 tablet (40 mg) by mouth once daily at bedtime.   Unknown    tamsulosin (Flomax) 0.4 mg 24 hr capsule Take 1 capsule (0.4 mg) by mouth once daily.   Unknown    vit A/vit C/vit E/zinc/copper (PRESERVISION AREDS ORAL) Take 1 tablet by mouth 2 times a day.   Unknown       Review of Systems   Neurological:  Positive for speech difficulty and weakness.   All other systems reviewed and are negative.    Neurological Exam  Physical Exam    On general examination, the patient is well appearing and well groomed. Heart is regular, rate and rhythm. Lungs are clear to ausculation bilaterally. There is no peripheral edema. Normal pedal pulses bilaterally. No carotid bruits.   On neurologic examination,  The history is related in quite good detail with no obvious deficit of attention,  "memory or language. Fund of knowledge is adequate. Orientation is intact to person, place and time. Spontaneous speech is very dysarthric, however language is otherwise intact.  On cranial nerve exam, the pupils are equal, round and reactive to light. Extraocular movements are full, without nystagmus. Visual fields are full to confrontation. There is left facial droop. The tongue is midline with no wasting or fasciculations. The palate elevates symmetrically. Facial sensation is intact to light touch and pinprick bilaterally. Hearing is intact to finger rub bilaterally. Shoulder shrug is 5/5 bilaterally.  Neck flexion and extension have full strength. Motor examination reveals normal tone and bulk in the upper and lower extremities bilaterally. There are no fasciculations. There is full strength in the proximal and distal muscles of the right upper and lower extremities.  Left arm shows 4-/5 proximally and 4+/5 distally, left leg 4/5.  Fine finger movements and rapid alternating movements are done well in both hands. There is no tremor. On coordination testing, finger-nose-finger testing are done well bilaterally. Sensory examination reveals normal light touch sensation in the distal upper and lower extremities bilaterally. Gait is deferred due to weakness.    Last Recorded Vitals  Blood pressure 123/61, pulse 57, temperature 36.3 °C (97.3 °F), resp. rate 16, height 1.753 m (5' 9\"), weight 93.2 kg (205 lb 7.5 oz), SpO2 95%.    Scheduled medications  [Held by provider] atorvastatin, 80 mg, oral, Nightly  insulin lispro, 0-10 Units, subcutaneous, q4h  pantoprazole, 40 mg, intravenous, Daily  tamsulosin, 0.4 mg, oral, Daily      Continuous medications  sodium chloride 0.9%, 50 mL/hr, Last Rate: 50 mL/hr (09/03/24 1131)      PRN medications  PRN medications: dextrose, glucagon, metoprolol, oxygen    Results for orders placed or performed during the hospital encounter of 09/02/24 (from the past 24 hour(s))   POCT GLUCOSE "   Result Value Ref Range    POCT Glucose 219 (H) 74 - 99 mg/dL   CBC and Auto Differential   Result Value Ref Range    WBC 8.1 4.4 - 11.3 x10*3/uL    nRBC 0.0 0.0 - 0.0 /100 WBCs    RBC 2.44 (L) 4.50 - 5.90 x10*6/uL    Hemoglobin 7.7 (L) 13.5 - 17.5 g/dL    Hematocrit 24.5 (L) 41.0 - 52.0 %     80 - 100 fL    MCH 31.6 26.0 - 34.0 pg    MCHC 31.4 (L) 32.0 - 36.0 g/dL    RDW 16.2 (H) 11.5 - 14.5 %    Platelets 190 150 - 450 x10*3/uL    Neutrophils % 75.0 40.0 - 80.0 %    Immature Granulocytes %, Automated 0.4 0.0 - 0.9 %    Lymphocytes % 15.3 13.0 - 44.0 %    Monocytes % 6.8 2.0 - 10.0 %    Eosinophils % 2.3 0.0 - 6.0 %    Basophils % 0.2 0.0 - 2.0 %    Neutrophils Absolute 6.09 (H) 1.60 - 5.50 x10*3/uL    Immature Granulocytes Absolute, Automated 0.03 0.00 - 0.50 x10*3/uL    Lymphocytes Absolute 1.24 0.80 - 3.00 x10*3/uL    Monocytes Absolute 0.55 0.05 - 0.80 x10*3/uL    Eosinophils Absolute 0.19 0.00 - 0.40 x10*3/uL    Basophils Absolute 0.02 0.00 - 0.10 x10*3/uL   Comprehensive metabolic panel   Result Value Ref Range    Glucose 209 (H) 74 - 99 mg/dL    Sodium 139 136 - 145 mmol/L    Potassium 4.1 3.5 - 5.3 mmol/L    Chloride 108 (H) 98 - 107 mmol/L    Bicarbonate 18 (L) 21 - 32 mmol/L    Anion Gap 17 10 - 20 mmol/L    Urea Nitrogen 60 (H) 6 - 23 mg/dL    Creatinine 3.07 (H) 0.50 - 1.30 mg/dL    eGFR 19 (L) >60 mL/min/1.73m*2    Calcium 8.8 8.6 - 10.3 mg/dL    Albumin 3.4 3.4 - 5.0 g/dL    Alkaline Phosphatase 104 33 - 136 U/L    Total Protein 5.9 (L) 6.4 - 8.2 g/dL    AST 14 9 - 39 U/L    Bilirubin, Total 0.3 0.0 - 1.2 mg/dL    ALT 18 10 - 52 U/L   Troponin I, High Sensitivity   Result Value Ref Range    Troponin I, High Sensitivity 143 (HH) 0 - 20 ng/L   Protime-INR   Result Value Ref Range    Protime 12.3 9.8 - 12.8 seconds    INR 1.1 0.9 - 1.1   APTT   Result Value Ref Range    aPTT 29 27 - 38 seconds   Lipid Panel   Result Value Ref Range    Cholesterol 89 0 - 199 mg/dL    HDL-Cholesterol 45.5 mg/dL     Cholesterol/HDL Ratio 2.0     LDL Calculated 33 <=99 mg/dL    VLDL 11 0 - 40 mg/dL    Triglycerides 54 0 - 149 mg/dL    Non HDL Cholesterol 44 0 - 149 mg/dL   Lavender Top   Result Value Ref Range    Extra Tube Hold for add-ons.    PST Top   Result Value Ref Range    Extra Tube Hold for add-ons.    Hemoglobin A1C   Result Value Ref Range    Hemoglobin A1C 5.7 (H) see below %    Estimated Average Glucose 117 Not Established mg/dL   B-Type Natriuretic Peptide   Result Value Ref Range     (H) 0 - 99 pg/mL   ECG 12 lead   Result Value Ref Range    Ventricular Rate 69 BPM    Atrial Rate 0 BPM    WA Interval 153 ms    QRS Duration 116 ms    QT Interval 442 ms    QTC Calculation(Bazett) 474 ms    R Axis -85 degrees    T Axis 98 degrees    QRS Count 11 beats    Q Onset 249 ms    T Offset 470 ms    QTC Fredericia 463 ms   Troponin I, High Sensitivity   Result Value Ref Range    Troponin I, High Sensitivity 144 (HH) 0 - 20 ng/L   POCT GLUCOSE   Result Value Ref Range    POCT Glucose 154 (H) 74 - 99 mg/dL   POCT GLUCOSE   Result Value Ref Range    POCT Glucose 152 (H) 74 - 99 mg/dL   POCT GLUCOSE   Result Value Ref Range    POCT Glucose 125 (H) 74 - 99 mg/dL   Lavender Top   Result Value Ref Range    Extra Tube Hold for add-ons.    Troponin I, High Sensitivity   Result Value Ref Range    Troponin I, High Sensitivity 139 (HH) 0 - 20 ng/L   Transthoracic Echo (TTE) Complete   Result Value Ref Range    AV pk tami 1.39 m/s    AV mn grad 5.0 mmHg    LVOT diam 1.80 cm    LA vol index A/L 45.9 ml/m2    Tricuspid annular plane systolic excursion 1.4 cm    LV EF 68 %    RV free wall pk S' 7.18 cm/s    LVIDd 4.30 cm    RVSP 36.7 mmHg    Aortic Valve Area by Continuity of VTI 1.59 cm2    Aortic Valve Area by Continuity of Peak Velocity 1.55 cm2    AV pk grad 7.7 mmHg    LV A4C EF 43.6    POCT GLUCOSE   Result Value Ref Range    POCT Glucose 148 (H) 74 - 99 mg/dL   Basic Metabolic Panel   Result Value Ref Range    Glucose 125 (H) 74  - 99 mg/dL    Sodium 140 136 - 145 mmol/L    Potassium 4.2 3.5 - 5.3 mmol/L    Chloride 111 (H) 98 - 107 mmol/L    Bicarbonate 20 (L) 21 - 32 mmol/L    Anion Gap 13 10 - 20 mmol/L    Urea Nitrogen 57 (H) 6 - 23 mg/dL    Creatinine 2.67 (H) 0.50 - 1.30 mg/dL    eGFR 23 (L) >60 mL/min/1.73m*2    Calcium 9.0 8.6 - 10.3 mg/dL   CBC   Result Value Ref Range    WBC 8.4 4.4 - 11.3 x10*3/uL    nRBC 0.0 0.0 - 0.0 /100 WBCs    RBC 2.49 (L) 4.50 - 5.90 x10*6/uL    Hemoglobin 7.9 (L) 13.5 - 17.5 g/dL    Hematocrit 24.8 (L) 41.0 - 52.0 %     80 - 100 fL    MCH 31.7 26.0 - 34.0 pg    MCHC 31.9 (L) 32.0 - 36.0 g/dL    RDW 16.1 (H) 11.5 - 14.5 %    Platelets 193 150 - 450 x10*3/uL        NIH Stroke Scale  1A. Level of Consciousness: Alert, Keenly Responsive  1B. Ask Month and Age: 1 Question Right  1C. Blink Eyes & Squeeze Hands: Performs 1 Task  2. Best Gaze: Partial Gaze Palsy  3. Visual: Partial Hemianopia  4. Facial Palsy: Partial Paralysis  5A. Motor - Left Arm: Some Effort Against Gravity  5B. Motor - Right Arm: No Drift  6A. Motor - Left Leg: No Drift  6B. Motor - Right Leg: No Drift  7. Limb Ataxia: Present in One Limb  8. Sensory Loss: Mild-to-Moderate Sensory Loss  9. Best Language: Mild-to-Moderate Aphasia  10. Dysarthria: Mild-to-Moderate Dysarthria  11. Extinction and Inattention: Visual, Tactile, Auditory, Spatial, or Personal Inattention  NIH Stroke Scale: 13           [place] Coma Scale  Best Eye Response: Spontaneous  Best Verbal Response: None  Best Motor Response: Follows commands  [place] Coma Scale Score: 11                MR brain wo IV contrast    Result Date: 9/3/2024  Interpreted By:  Kirby Root and Liller Gregory STUDY: MR BRAIN WO IV CONTRAST; 9/3/2024 11:33 am   INDICATION: Signs/Symptoms:stroke.   COMPARISON: CT brain attack 09/02/2024 at 8:22 p.m. and 8:11 p.m.   ACCESSION NUMBER(S): [id number]   ORDERING CLINICIAN: DARIN CALVILLO   TECHNIQUE: Multiplanar, multisequential MR imaging of  the brain was performed without contrast material.   FINDINGS: Parenchyma: Diffusion restriction with associated T2 and FLAIR hyperintense signal noted within the right parietal lobe extending into the superior aspect of the right temporal lobe as well as along the posterior margin of the right frontal lobe precentral gyrus consistent with patient's known history of right MCA infarction.   There is mild associated locoregional gyral swelling and sulcal effacement. No hemorrhage is noted. No significant midline shift or herniation.   CSF Spaces: The ventricles, sulci and basal cisterns are within normal limits. No abnormal extra-axial fluid collection.   Paranasal Sinuses and Mastoids: Trace fluid noted within the right mastoid air cells. Mild mucosal thickening of the ethmoid air cells. The remainder of the paranasal sinuses are clear.   Orbits: The visualized orbits are unremarkable in appearance.   Vasculature: Increased T2 signal noted within the proximal right M2 branch (series 7 image 17 and 18) which is better evaluated on CTA of the head from 09/02/2024 consistent with patient's known right M2 infarction. The remainder of the visualized flow voids display expected low signal. There is dominance of the left vertebral artery as compared to the right.       Findings consistent with known, acute right MCA infarction involving the right parietal and temporal lobes as described without evidence of significant mass effect, herniation, or hemorrhagic transformation.   I personally reviewed the images/study and I agree with the findings as stated above by resident physician, Dr. Martín Argueta.   MACRO: None   Signed by: Kirby Root 9/3/2024 12:05 PM Dictation workstation:   [id number]   No CT head results found for the past 14 days  Transthoracic Echo (TTE) Complete    Result Date: 9/3/2024   [facility], [address] [place] [address]           Tel [phone] and Fax [phone] TRANSTHORACIC  ECHOCARDIOGRAM REPORT  Patient Name:      KEN RODRIGUEZ       Christopher Physician:    [id number] Gomez Flores MD Study Date:        9/3/2024             Ordering Provider:    [id number] DARIN LIMA                                                               KARLI MRN/PID:           [id number]             Fellow: Accession#:        [id number]         Nurse:                Sofi Muñoz RN Date of Birth/Age: 1940 / 83 years Sonographer:          Stephanie Colin                                                               RDCS Gender:            M                    Additional Staff: Height:            175.00 cm            Admit Date:           9/2/2024 Weight:            93.00 kg             Admission Status:     Inpatient -                                                               Routine BSA / BMI:         2.09 m2 / 30.37      Encounter#:           [id number]                    kg/m2 Blood Pressure:    106/54 mmHg          Department Location:  [address]                                                               [facility] Study Type:    TRANSTHORACIC ECHO (TTE) COMPLETE Diagnosis/ICD: Encounter for other specified special examinations-Z01.89 Indication:    Acute stroke due to embolism of right middle cerebral artery (;; CPT Code:      Echo Complete w Full Doppler-[id number] Patient History: Pertinent History: Onset of left sided weakness, expressive aphasia, dysarthria,                    mental status change, and slurred speech. Study Detail: The following Echo studies were performed: 2D, M-Mode, Doppler,               color flow and 3D. Technically challenging study due to body               habitus and the patient's lack of cooperation. Agitated saline               used as a contrast agent for intraseptal flow evaluation.  PHYSICIAN INTERPRETATION: Left Ventricle: The left  ventricular systolic function is normal, with a visually estimated ejection fraction of 65-70%. There are no regional left ventricular wall motion abnormalities. The left ventricular cavity size is normal. There is severe concentric left ventricular hypertrophy. Left ventricular diastolic filling was indeterminate. Left Atrium: The left atrium is moderately dilated. A bubble study using agitated saline was performed. Bubble study is negative. Right Ventricle: The right ventricle is normal in size. There is normal right ventricular global systolic function. Right Atrium: The right atrium is mildly dilated. Aortic Valve: The aortic valve appears structurally normal. The aortic valve dimensionless index is 0.62. There is no evidence of aortic valve regurgitation. The peak instantaneous gradient of the aortic valve is 7.7 mmHg. The mean gradient of the aortic valve is 5.0 mmHg. Mitral Valve: The mitral valve is mildly thickened. The mitral valve spectral Doppler A-wave is absent, consistent with atrial fibrillation. There is trace mitral valve regurgitation. Tricuspid Valve: The tricuspid valve is structurally normal. There is trace tricuspid regurgitation. The Doppler estimated RVSP is mildly elevated at 36.7 mmHg. Pulmonic Valve: The pulmonic valve is structurally normal. There is no indication of pulmonic valve regurgitation. Pericardium: There is a trivial to small pericardial effusion. Pleural: There is a moderate left pleural effusion. Aorta: The aortic root is normal. There is mild dilatation of the ascending aorta. There is mild dilatation of the aortic root. Systemic Veins: The inferior vena cava appears dilated. There is IVC inspiratory collapse greater than 50%. In comparison to the previous echocardiogram(s): There are no prior studies on this patient for comparison purposes.  CONCLUSIONS:  1. The left ventricular systolic function is normal, with a visually estimated ejection fraction of 65-70%.  2. Left  ventricular diastolic filling was indeterminate.  3. There is severe concentric left ventricular hypertrophy.  4. There is normal right ventricular global systolic function.  5. The left atrium is moderately dilated.  6. There is a moderate pleural effusion.  7. Absent A-wave on MV spectral Doppler tracing, consistent with atrial fibrillation.  8. Bubble study is negative.  9. Mildly elevated RVSP. RECOMMENDATIONS: Based on the results of this study, it is recommended that the patient is considered for a cardiac MRI.  QUANTITATIVE DATA SUMMARY: 2D MEASUREMENTS:                         Normal Ranges: LAs:           5.50 cm  (2.7-4.0cm) IVSd:          1.90 cm  (0.6-1.1cm) LVPWd:         1.70 cm  (0.6-1.1cm) LVIDd:         4.30 cm  (3.9-5.9cm) LVIDs:         2.60 cm LV Mass Index: 165 g/m2 LVEDV Index:   35 ml/m2 LV % FS        39.5 % LA VOLUME:                               Normal Ranges: LA Vol A4C:        111.0 ml   (22+/-6mL/m2) LA Vol A2C:        78.0 ml LA Vol BP:         95.7 ml LA Vol Index A4C:  53.2ml/m2 LA Vol Index A2C:  37.4 ml/m2 LA Vol Index BP:   45.9 ml/m2 LA Area A4C:       29.8 cm2 LA Area A2C:       25.7 cm2 LA Major Axis A4C: 6.8 cm LA Major Axis A2C: 7.2 cm LA Volume Index:   45.9 ml/m2 RA VOLUME BY A/L METHOD:                               Normal Ranges: RA Vol A4C:        76.0 ml    (8.3-19.5ml) RA Vol Index A4C:  36.4 ml/m2 RA Area A4C:       25.4 cm2 RA Major Axis A4C: 7.2 cm M-MODE MEASUREMENTS:                  Normal Ranges: Ao Root: 3.20 cm (2.0-3.7cm) LAs:     5.20 cm (2.7-4.0cm) AORTA MEASUREMENTS:                      Normal Ranges: Ao Sinus, d: 4.00 cm (2.1-3.5cm) Ao STJ, d:   3.60 cm (1.7-3.4cm) Asc Ao, d:   3.90 cm (2.1-3.4cm) LV SYSTOLIC FUNCTION BY 2D PLANIMETRY (MOD):                      Normal Ranges: EF-A4C View:    44 % (>=55%) EF-A2C View:    57 % EF-Biplane:     45 % EF-Visual:      68 % LV EF Reported: 68 % LV DIASTOLIC FUNCTION:                             Normal Ranges:  PulmV Sys Himanshu:  63.50 cm/s PulmV Farfan Himanshu: 104.00 cm/s PulmV S/D Himanshu:  0.60 AORTIC VALVE:                                   Normal Ranges: AoV Vmax:                1.39 m/s (<=1.7m/s) AoV Peak P.7 mmHg (<20mmHg) AoV Mean P.0 mmHg (1.7-11.5mmHg) LVOT Max Himanshu:            0.85 m/s (<=1.1m/s) AoV VTI:                 29.50 cm (18-25cm) LVOT VTI:                18.40 cm LVOT Diameter:           1.80 cm  (1.8-2.4cm) AoV Area, VTI:           1.59 cm2 (2.5-5.5cm2) AoV Area,Vmax:           1.55 cm2 (2.5-4.5cm2) AoV Dimensionless Index: 0.62  RIGHT VENTRICLE: RV Basal 4.00 cm RV Mid   3.10 cm RV Major 8.2 cm TAPSE:   14.2 mm RV s'    0.07 m/s TRICUSPID VALVE/RVSP:                             Normal Ranges: Peak TR Velocity: 2.68 m/s Est. RA Pressure: 8 mmHg RV Syst Pressure: 36.7 mmHg (< 30mmHg) IVC Diam:         2.58 cm PULMONIC VALVE:                      Normal Ranges: PV Max Himanshu: 1.1 m/s  (0.6-0.9m/s) PV Max P.5 mmHg PV Mean PG: 3.0 mmHg PV VTI:     20.80 cm Pulmonary Veins: PulmV Farfan Himanshu: 104.00 cm/s PulmV S/D Himanshu:  0.60 PulmV Sys Himanshu:  63.50 cm/s  [id number] Gomez Flores MD Electronically signed on 9/3/2024 at 2:51:57 PM  ** Final **        Results from last 7 days   Lab Units 24   HEMOGLOBIN A1C % 5.7*     BNP   Date/Time Value Ref Range Status   2024 08:20  (H) 0 - 99 pg/mL Final                                                                          MR brain wo IV contrast    Result Date: 9/3/2024  Interpreted By:  Kirby Root and Liller Gregory STUDY: MR BRAIN WO IV CONTRAST; 9/3/2024 11:33 am   INDICATION: Signs/Symptoms:stroke.   COMPARISON: CT brain attack 2024 at 8:22 p.m. and 8:11 p.m.   ACCESSION NUMBER(S): [id number]   ORDERING CLINICIAN: DARIN CALVILLO   TECHNIQUE: Multiplanar, multisequential MR imaging of the brain was performed without contrast material.   FINDINGS: Parenchyma: Diffusion restriction with associated T2 and FLAIR  hyperintense signal noted within the right parietal lobe extending into the superior aspect of the right temporal lobe as well as along the posterior margin of the right frontal lobe precentral gyrus consistent with patient's known history of right MCA infarction.   There is mild associated locoregional gyral swelling and sulcal effacement. No hemorrhage is noted. No significant midline shift or herniation.   CSF Spaces: The ventricles, sulci and basal cisterns are within normal limits. No abnormal extra-axial fluid collection.   Paranasal Sinuses and Mastoids: Trace fluid noted within the right mastoid air cells. Mild mucosal thickening of the ethmoid air cells. The remainder of the paranasal sinuses are clear.   Orbits: The visualized orbits are unremarkable in appearance.   Vasculature: Increased T2 signal noted within the proximal right M2 branch (series 7 image 17 and 18) which is better evaluated on CTA of the head from 09/02/2024 consistent with patient's known right M2 infarction. The remainder of the visualized flow voids display expected low signal. There is dominance of the left vertebral artery as compared to the right.       Findings consistent with known, acute right MCA infarction involving the right parietal and temporal lobes as described without evidence of significant mass effect, herniation, or hemorrhagic transformation.   I personally reviewed the images/study and I agree with the findings as stated above by resident physician, Dr. Martín Argueta.   MACRO: None   Signed by: Kirby Root 9/3/2024 12:05 PM Dictation workstation:   [id number]      CT brain attack head wo IV contrast    Result Date: 9/2/2024  Interpreted By:  Gomez Rosenthal, STUDY: CT BRAIN ATTACK HEAD WO IV CONTRAST; CT BRAIN ATTACK ANGIO HEAD AND NECK W AND WO IV CONTRAST;  9/2/2024 8:11 pm; 9/2/2024 8:22 pm   INDICATION: Signs/Symptoms:Stroke Evaluation; Signs/Symptoms:left sided weakness   COMPARISON: None.   ACCESSION  NUMBER(S): [id number]; [id number]   ORDERING CLINICIAN: SHANKAR NESS   TECHNIQUE: Multiple contiguous axial noncontrast images of the head were obtained. Following IV contrast administration of iodinated contrast, a CT angiography of the head and neck was performed. MIPS and 3D reconstructions of the [place] and neck were created on an independent workstation and reviewed.   FINDINGS: NON-CONTRAST HEAD CT:   BRAIN PARENCHYMA:  No evidence of acute intraparenchymal hemorrhage. No mass-effect, midline shift or effacement of cerebral sulci. There is suggestion for slight loss of gray-white differentiation involving the right insula.   VENTRICLES and EXTRA-AXIAL SPACES: Along the right frontal lobe there is a 0.5 cm low-density subdural fluid collection (coronal image 31, 02/02/2004; sagittal image 40, series 203))measuring no greater than 0.5 cm concerning for subacute or chronic subdural hematoma. Otherwise no acute extra-axial hemorrhage. Ventricles and sulci are age-concordant.   PARANASAL SINUSES/MASTOIDS:  No hemorrhage or air-fluid levels within the visualized paranasal sinuses. The mastoids are well aerated.   CALVARIUM/ORBITS:  No skull fracture.  The orbits and globes are intact to the extent visualized.   EXTRACRANIAL SOFT TISSUES: No discernible abnormality.       CTA NECK:   Partially visualized left pleural effusion. There is pericardial fluid in the superior aortic recess.   LEFT VERTEBRAL ARTERY: Mild atherosclerosis of the origin. No hemodynamically significant stenosis, occlusion, or dissection.   LEFT COMMON/INTERNAL CAROTID ARTERY: Minimal atherosclerosis at the bulb. No hemodynamically significant stenosis, occlusion, or dissection.   RIGHT VERTEBRAL ARTERY: Mild atherosclerosis of the origin. No hemodynamically significant stenosis, occlusion, or dissection.   RIGHT COMMON/INTERNAL CAROTID ARTERY: Mild atherosclerosis at the bulb. No hemodynamically significant stenosis, occlusion,  or dissection.     The neck soft tissues show no evidence of mass, fluid collection, or enlarged lymph nodes.   There is no acute osseous abnormality.         CTA HEAD:   ANTERIOR CIRCULATION: No aneurysm.   - Internal Carotid Arteries: Minimal calcific atherosclerosis bilaterally. No hemodynamically significant stenosis or occlusion.   - Middle Cerebral Arteries: There is an acute complete occlusion involving the right M2 inferior division branch. It transiently reconstitutes but then again occludes at the M3 branch point and there is a paucity of M4 cortical opacification involving its territory throughout the right parietal and posterior temporal regions.   - Anterior Cerebral Arteries:  No hemodynamically significant stenosis or occlusion.     POSTERIOR CIRCULATION: No aneurysm.   - Intracranial Vertebral Arteries: Mild calcific atherosclerosis on the left. No hemodynamically significant stenosis or occlusion.   - Basilar Artery:  No hemodynamically significant stenosis or occlusion.   - Posterior Cerebral Arteries:  No hemodynamically significant stenosis or occlusion.   No arteriovenous malformation is visualized. No pathologic intracranial enhancement or discrete mass. The dural venous sinuses are patent.   MIPS and 3D reconstructions confirm the above findings.       Acute complete occlusion involving the right M2 inferior division branch. It transiently reconstitutes but then again occludes at the M3 branch point and there is a slight paucity of M4 cortical opacification involving its territory throughout the right parietal and posterior temporal regions.   Otherwise, no hemodynamically significant intracranial or extracranial stenosis, occlusion, or aneurysm.   Suspect a early loss in the gray-white differentiation of the insula.   Age-indeterminate subdural hematoma focally overlying the right frontal lobe measuring no greater than 0.5 cm in maximal thickness and demonstrates a density just above CSF,  favoring chronic or subacute aged.   Moderate-sized left pleural effusion.   MACRO: Gomez Rosenthal discussed the significance and urgency of this critical finding via [employer] with  SHANKAR NESS on 9/2/2024 at 8:23 pm.  (**-RCF-**) Findings:  See findings.   Signed by: Gomez Rosenthal 9/2/2024 8:37 PM Dictation workstation:   [id number]    CT brain attack angio head and neck W and WO IV contrast    Result Date: 9/2/2024  Interpreted By:  Gomez Rosenthal, STUDY: CT BRAIN ATTACK HEAD WO IV CONTRAST; CT BRAIN ATTACK ANGIO HEAD AND NECK W AND WO IV CONTRAST;  9/2/2024 8:11 pm; 9/2/2024 8:22 pm   INDICATION: Signs/Symptoms:Stroke Evaluation; Signs/Symptoms:left sided weakness   COMPARISON: None.   ACCESSION NUMBER(S): [id number]; [id number]   ORDERING CLINICIAN: SHANKAR NESS   TECHNIQUE: Multiple contiguous axial noncontrast images of the head were obtained. Following IV contrast administration of iodinated contrast, a CT angiography of the head and neck was performed. MIPS and 3D reconstructions of the [place] and neck were created on an independent workstation and reviewed.   FINDINGS: NON-CONTRAST HEAD CT:   BRAIN PARENCHYMA:  No evidence of acute intraparenchymal hemorrhage. No mass-effect, midline shift or effacement of cerebral sulci. There is suggestion for slight loss of gray-white differentiation involving the right insula.   VENTRICLES and EXTRA-AXIAL SPACES: Along the right frontal lobe there is a 0.5 cm low-density subdural fluid collection (coronal image 31, 02/02/2004; sagittal image 40, series 203))measuring no greater than 0.5 cm concerning for subacute or chronic subdural hematoma. Otherwise no acute extra-axial hemorrhage. Ventricles and sulci are age-concordant.   PARANASAL SINUSES/MASTOIDS:  No hemorrhage or air-fluid levels within the visualized paranasal sinuses. The mastoids are well aerated.   CALVARIUM/ORBITS:  No skull fracture.  The orbits and globes are intact to the  extent visualized.   EXTRACRANIAL SOFT TISSUES: No discernible abnormality.       CTA NECK:   Partially visualized left pleural effusion. There is pericardial fluid in the superior aortic recess.   LEFT VERTEBRAL ARTERY: Mild atherosclerosis of the origin. No hemodynamically significant stenosis, occlusion, or dissection.   LEFT COMMON/INTERNAL CAROTID ARTERY: Minimal atherosclerosis at the bulb. No hemodynamically significant stenosis, occlusion, or dissection.   RIGHT VERTEBRAL ARTERY: Mild atherosclerosis of the origin. No hemodynamically significant stenosis, occlusion, or dissection.   RIGHT COMMON/INTERNAL CAROTID ARTERY: Mild atherosclerosis at the bulb. No hemodynamically significant stenosis, occlusion, or dissection.     The neck soft tissues show no evidence of mass, fluid collection, or enlarged lymph nodes.   There is no acute osseous abnormality.         CTA HEAD:   ANTERIOR CIRCULATION: No aneurysm.   - Internal Carotid Arteries: Minimal calcific atherosclerosis bilaterally. No hemodynamically significant stenosis or occlusion.   - Middle Cerebral Arteries: There is an acute complete occlusion involving the right M2 inferior division branch. It transiently reconstitutes but then again occludes at the M3 branch point and there is a paucity of M4 cortical opacification involving its territory throughout the right parietal and posterior temporal regions.   - Anterior Cerebral Arteries:  No hemodynamically significant stenosis or occlusion.     POSTERIOR CIRCULATION: No aneurysm.   - Intracranial Vertebral Arteries: Mild calcific atherosclerosis on the left. No hemodynamically significant stenosis or occlusion.   - Basilar Artery:  No hemodynamically significant stenosis or occlusion.   - Posterior Cerebral Arteries:  No hemodynamically significant stenosis or occlusion.   No arteriovenous malformation is visualized. No pathologic intracranial enhancement or discrete mass. The dural venous sinuses are  patent.   MIPS and 3D reconstructions confirm the above findings.       Acute complete occlusion involving the right M2 inferior division branch. It transiently reconstitutes but then again occludes at the M3 branch point and there is a slight paucity of M4 cortical opacification involving its territory throughout the right parietal and posterior temporal regions.   Otherwise, no hemodynamically significant intracranial or extracranial stenosis, occlusion, or aneurysm.   Suspect a early loss in the gray-white differentiation of the insula.   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   Moderate-sized left pleural effusion.   MACRO: Gomez Rosenthal discussed the significance and urgency of this critical finding via [employer] with  SHANKAR NESS on 9/2/2024 at 8:23 pm.  (**-RCF-**) Findings:  See findings.   Signed by: Gomez Rosenthal 9/2/2024 8:37 PM Dictation workstation:   [id number]        IV Thrombolysis IV Thrombolysis Checklist      IV Thrombolysis Given: No; Thrombolysis contraindication reason: History of GI bleed within 21 days         Assessment/Plan   Assessment & Plan  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      Mr. Marques is a 83 year old man whom presented with acute onset of left sided weakness and dysthria, found to have a right inferior MCA stroke with M2 occlusion.  Unable to get TNK due to recent GI bleed and to high risk for thrombectomy.  Etiology of stroke likely cardioembolic from a. Fib. Patient off anti-coagulation due to recent GI hemorrhage. Per family patient was having issues with bleeding -both from bladder and GI tract - despite eliquis being lowered to 2.5 mg BID.  Discussed restarting anti-coagulation with GI team.  Etiology of hemorrhage unclear based on work up performed by [facility]  team.      Recommendations:  -start aspirin 325 mg rectal until able to give oral meds, given size of stroke would hold anti-coagulation for 5-7 days to lower risk of hemorrhagic conversion  -consult cardiology to discuss management - echo results recommending cardiac MRI - will defer this to cardiology  -PT/OT/speech therapy, may need MBBS if continues to fail bedside swallow  -continue lipitor 80 mg daily once able to take PO    Will continue to follow.       Laly Rodriguez, DO

## 2024-09-03 NOTE — PROGRESS NOTES
Occupational Therapy    Evaluation    Patient Name: Regulo Marques  MRN: [id number]  Today's Date: 9/3/2024  Time Calculation  Start Time: 1030  Stop Time: 1054  Time Calculation (min): 24 min        Assessment:  End of Session Communication: Bedside nurse    OT Assessment Results: Decreased ADL status, Decreased upper extremity range of motion, Decreased upper extremity strength, Decreased safe judgment during ADL, Decreased endurance, Decreased fine motor control, Decreased functional mobility  Plan:  Treatment Interventions: ADL retraining, Functional transfer training, UE strengthening/ROM, Endurance training, Equipment evaluation/education, Neuromuscular reeducation, Fine motor coordination activities, Compensatory technique education  OT Frequency: 5 times per week  OT Discharge Recommendations: High intensity level of continued care  OT - OK to Discharge: Yes (once medically appropriate to next level of care)  Treatment Interventions: ADL retraining, Functional transfer training, UE strengthening/ROM, Endurance training, Equipment evaluation/education, Neuromuscular reeducation, Fine motor coordination activities, Compensatory technique education    Subjective   Current Problem:  1. 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  Transthoracic Echo (TTE) Complete    Transthoracic Echo (TTE) Complete        General:  General  Reason for Referral: OT eval and tx; ADLs. dx :acute stroke/elevated troponin/MARIMAR. CT/CTA: Acute complete occlusion involving the right M2 inferior division  branch. It transiently reconstitutes but then again occludes at the  M3 branch point and there is a slight paucity of M4 cortical  opacification involving its territory throughout the right parietal  and posterior temporal regions.      Otherwise, no hemodynamically significant intracranial or  extracranial stenosis, occlusion, or aneurysm. Chest x-ray: No acute pulmonary pathology. MRI brain: Findings consistent  with known, acute right MCA infarction involving  the right parietal and temporal lobes as described without evidence  of significant mass effect, herniation, or hemorrhagic transformation.  Referred By: Jair  Past Medical History Relevant to Rehab: 83 y.o. male presenting to the ER with acute onset of left sided weakness, expressive aphasia, dysarthria, mental status change, and slurred speech. Pt. Was out to dinner with his wife tonight when these symptoms developed. He was apparently in his normal state of health prior to dinner. He was taking Eliquis for A-Fib but was taken off of the Eliquis on 8/12/2024 2/2 an admission at [facility] for a GI bleed. Pt. With no prior hx. Of stroke.      In ED, troponin 143 with a repeat of 144.  Glucose 209, chloride 108, BUN 60, creatinine 3.07, GFR 19, hemoglobin 7.7, hematocrit 24.5.  EKG completed showing A-fib at a rate of 73 per ER physician review.  CT angio of the head and neck completed showing and it acute complete occlusion of the right M2 inferior division branch transiently reconstituted but again including at M3 with a slight paucity of M4 cortical opacification involving the right parietal and posterior temporal regions, suspect early loss in the gray-white differentiation of the insula, age-indeterminate subdural hematoma overlying the right frontal lobe measuring 0.5 cm per radiology review.  NIH 6.  Patient not a candidate for TNK 2/2 recent GI bleed on 8/12/2024.  /63, heart rate 70, respirations 16, temperature 37.1 °C, SpO2 98% on room air.  Patient was initially to be [place] [facility] [place]surgery at [facility] deemed patient to be too unstable for airlift transport.  Patient admitted to SDU under the care of Dr. Marcos Adam who will continue to follow.  I was asked to do H&P and place initial admission orders.    PCP Dr. Paul Casarez.   PMH:Diabetes, diverticulitis, hypertension, colon cancer     Surgical History  Right hip replacement,  tonsillectomy, cholecystectomy, colonoscopy, colostomy, prostate surgery, TURP  Family/Caregiver Present: Yes (Patient spouse and son present, very supportive)  Co-Treatment: PT  Co-Treatment Reason: for safety and to maximize therapeutic performance  Prior to Session Communication: Bedside nurse  Patient Position Received:  (patient lying in bed at start and end of eval, HOB elevated to comfort, call light in reach, bed alarm engaged, all needs met. IV in place throughout)  General Comment: patient pleasant and cooperative; dysarthria and L side neglect/weakness noted  Precautions:  Precautions Comment: fall precautions, alarms, L side neglect, NPO, aspiration precautions, OOB with assist            Pain:  Pain Assessment  Pain Assessment:  (reports 2/10 pain in back; repositioned end of eval)    Objective   Cognition:  Overall Cognitive Status: Within Functional Limits (delayed processing and dysarthria; max cues for all tasks; presenting with L side neglect)           Home Living:  Type of Home:  (per patient and spouse/son, patient and spouse have a unit at [facility] and are in processing of moving in as soon as possible. currently, lives in a house with spouse, 1 EDUARDO from side, 3 EDUARDO from front with HR.)  Bathroom Shower/Tub:  (tub shower, shower chair, handheld shower)  Bathroom Toilet:  (standard toilet)  Home Living Comments: bedroom/bathroom on second floor. 10 steps, landing,  plus 3 steps with HR. has BSC on main floor  Prior Function:  Level of [place]:  (independent in ADLs PLOF. does most IADLs as spouse unable to do laundry per report. eats most meals at restuarants. patient drives.)    ADL:  Toileting Assistance with Device:  (episode of bowel incontinence; MAX A x 2 for hygiene standing with WW)    Bed Mobility/Transfers: Bed Mobility  Bed Mobility:  (completed supine to sit with MAX A x 2 and sit to supine with dependent assist x 2)    Transfers  Transfer:  (completed STS with MAX A x 2 with  WW; able to complete static standing with MAX A x 1 with WW with severe left lateral lean and patient unable to correct with cueing)      Functional Mobility:  Functional Mobility  Functional Mobility Performed:  (unable to safely attempt this date)     Sensation:  Light Touch: No apparent deficits  Strength:  Strength Comments: RUE ROM/MMT grossly WFL for patient age; LUE grossly 3-/5; however difficulty with fully assessing as patient with left side neglect and poor command following. unable to complete finger to thumb opposition with Left hand upon eval  Perception:     Coordination:  Finger to Nose:  (unable to complete due to poor command following, left side neglect)       Outcome Measures:[facility] Daily Activity  Putting on and taking off regular lower body clothing: Total  Bathing (including washing, rinsing, drying): Total  Putting on and taking off regular upper body clothing: Total  Toileting, which includes using toilet, bedpan or urinal: Total  Taking care of personal grooming such as brushing teeth: A lot  Eating Meals: A lot  Daily Activity - Total Score: 8        Education Documentation  Body Mechanics, taught by Loly Anderson OT at 9/3/2024  1:09 PM.  Learner: Significant Other, Family, Patient  Readiness: Acceptance  Method: Explanation  Response: Verbalizes Understanding, Needs Reinforcement    ADL Training, taught by Loly Anderson OT at 9/3/2024  1:09 PM.  Learner: Significant Other, Family, Patient  Readiness: Acceptance  Method: Explanation  Response: Verbalizes Understanding, Needs Reinforcement    Education Comments  No comments found.        OP EDUCATION:       Goals:  Encounter Problems       Encounter Problems (Active)       OT Goals       STG- patient will complete LB dressing at MOD A with use of ae/ad/dme prn (Progressing)       Start:  09/03/24    Expected End:  09/17/24            STG- patient will complete toileting at MOD A with use of ae/ad/dme prn (Progressing)       Start:   09/03/24    Expected End:  09/17/24            STG- patient will complete transfers to/from bed, chair, commode at MOD A with use of ae/ad/dme prn (Progressing)       Start:  09/03/24    Expected End:  09/17/24            STG- patient will complete lateral side steps with MOD A with use of ae/ad/dme prn in preparation for ADLs  (Progressing)       Start:  09/03/24    Expected End:  09/17/24            STG- patient will complete grooming with MIN A with use of ae/ad/dme prn (Progressing)       Start:  09/03/24    Expected End:  09/17/24

## 2024-09-03 NOTE — CARE PLAN
Problem: Fall/Injury  Goal: Not fall by end of shift  9/3/2024 1147 by Collette Smith, RN  Outcome: Progressing  9/3/2024 1126 by Collette Smith, RN  Outcome: Progressing  Goal: Be free from injury by end of the shift  9/3/2024 1147 by Collette Smith, RN  Outcome: Progressing  9/3/2024 1126 by Collette Smith, RN  Outcome: Progressing  Goal: Verbalize understanding of personal risk factors for fall in the hospital  9/3/2024 1147 by Collette Smith, RN  Outcome: Progressing  9/3/2024 1126 by Collette Smith, RN  Outcome: Progressing  Goal: Verbalize understanding of risk factor reduction measures to prevent injury from fall in the home  9/3/2024 1147 by Collette Smith, RN  Outcome: Progressing  9/3/2024 1126 by Collette Smith, RN  Outcome: Progressing  Goal: Use assistive devices by end of the shift  9/3/2024 1147 by Collette Smith, RN  Outcome: Progressing  9/3/2024 1126 by Collette Smith, RN  Outcome: Progressing  Goal: Pace activities to prevent fatigue by end of the shift  9/3/2024 1147 by Collette Smith, RN  Outcome: Progressing  9/3/2024 1126 by Collette Smith, RN  Outcome: Progressing   The patient's goals for the shift include      The clinical goals for the shift include comfort

## 2024-09-03 NOTE — CARE PLAN
RN called and stated patient's UA resulted. UA consistent with UTI from catheter specimen. Urology already on consult. Rocephin initiated. Follow urine culture. Continue NS@50ml/hr. Attending to follow.

## 2024-09-03 NOTE — PROGRESS NOTES
PHARMACY STROKE RESPONSE      Patient Name: Regulo Marques  MRN: [id number]  [facility]    Patient Weight (kg):   Wt Readings from Last 1 Encounters:   09/02/24 85 kg (187 lb 6.3 oz)        An acute Brain Attack has been activated, pharmacy participated in multidisciplinary team bedside response for Regulo Marques.  Contraindications for fibrinolytic therapy have been reviewed by pharmacy and any issues relating to medication therapy have been discussed directly with the provider(s) caring for this patient.     Pharmacy aided in the bedside response for  stroke alert . Patient did not receive fibrinolysis.     Patient was diagnosed with an upper GI bleed on 8/12/2024 (21 days prior to today's visit), this pertinent history excludes him as a TNK candidate.     Orders Placed This Encounter      iohexol (OMNIPaque) 350 mg iodine/mL solution 70 mL      lactated Ringer's bolus 1,000 mL      Thank you for allowing me to take part in the care of this patient.     Kenia Dickinson, PharmD  9/2/2024  8:29 PM         References:    Neurological [place] Stroke Tools   Neurological [place] IV Thrombolysis Checklist

## 2024-09-03 NOTE — NURSING NOTE
09/03/24 0915 Patient Navigator  I introduced myself to the patient and explained my role. He states he lives with his wife and has the help of her along with his son as needed. He states he has limited mobility and I explained the importance of exercising 30 minutes 3 times a week. He states that he and his wife are in the process of moving to an independent living facility. He states they go out to eat frequently and he attempts to eat a variety of healthy foods. I reviewed the following lab values and what they indicates: A1C, cholesterol, HDL, LDL, & triglycerides. He states he was diagnosed with diabetes however his PCP, Dr. Casarez, took him off his medication awhile ago. He verbalized understanding of the above note and denied any needs. Please see the handouts listed below which I reviewed with him. I gave him my contact information & to call me as needed. Please see the education tab for additional information. I have updated the patient's nurse of my visit.    Handouts:   Stroke Folder  Lifestyle changes to prevent a stroke- American Stroke Association  The connection between diabetes & stroke- American Stroke Association  How do I follow a healthy diet pattern? American Heart Association    Shirley HALL, RN  Patient Navigator  Diabetes Care &   Stroke Educator

## 2024-09-03 NOTE — H&P
History Of Present Illness  Regulo Marques is a 83 y.o. male presenting to the ER with acute onset of left sided weakness, expressive aphasia, dysarthria, mental status change, and slurred speech. Pt. Was out to dinner with his wife tonight when these symptoms developed. He was apparently in his normal state of health prior to dinner. He was taking Eliquis for A-Fib but was taken off of the Eliquis on 8/12/2024 2/2 an admission at [facility] for a GI bleed. Pt. With no prior hx. Of stroke.     In ED, troponin 143 with a repeat of 144.  Glucose 209, chloride 108, BUN 60, creatinine 3.07, GFR 19, hemoglobin 7.7, hematocrit 24.5.  EKG completed showing A-fib at a rate of 73 per ER physician review.  CT angio of the head and neck completed showing and it acute complete occlusion of the right M2 inferior division branch transiently reconstituted but again including at M3 with a slight paucity of M4 cortical opacification involving the right parietal and posterior temporal regions, suspect early loss in the gray-white differentiation of the insula, age-indeterminate subdural hematoma overlying the right frontal lobe measuring 0.5 cm per radiology review.  NIH 6.  Patient not a candidate for TNK 2/2 recent GI bleed on 8/12/2024.  /63, heart rate 70, respirations 16, temperature 37.1 °C, SpO2 98% on room air.  [place] [facility] [place] [facility] [place] to be too unstable for airlift transport.  Patient admitted to SDU under the care of Dr. Marcos Adam who will continue to follow.  I was asked to do H&P and place initial admission orders.    PCP Dr. Paul Casarez.      Past Medical History  Diabetes, diverticulitis, hypertension, colon cancer    Surgical History  Right hip replacement, tonsillectomy, cholecystectomy, colonoscopy, colostomy, prostate surgery, TURP     Social History  Former smoker (quit in 1967), no drug use, no alcohol use  Family History  Breast cancer, colon  "cancer, lung cancer     Allergies  Codeine    Review of Systems   Unable to perform ROS: Acuity of condition        Physical Exam  HENT:      Mouth/Throat:      Mouth: Mucous membranes are dry.      Pharynx: Oropharynx is clear.   Eyes:      Pupils: Pupils are equal, round, and reactive to light.   Cardiovascular:      Rate and Rhythm: Normal rate.   Pulmonary:      Effort: Pulmonary effort is normal.      Breath sounds: Normal breath sounds.   Abdominal:      General: Bowel sounds are normal.      Palpations: Abdomen is soft.   Skin:     General: Skin is warm and dry.      Capillary Refill: Capillary refill takes less than 2 seconds.   Neurological:      Mental Status: He is alert.      Cranial Nerves: Dysarthria present.      Motor: Weakness and pronator drift present.      Comments: NIH 6          Last Recorded Vitals  Blood pressure 104/51, pulse 55, temperature 36.1 °C (97 °F), resp. rate 18, height 1.753 m (5' 9\"), weight 85 kg (187 lb 6.3 oz), SpO2 98%.    Relevant Results  XR chest 1 view    Result Date: 9/2/2024  STUDY: Chest Radiograph;  09/02/2024 at 10:08 PM INDICATION: Stroke. COMPARISON: XR chest and abdomen 04/15/21. XR chest 10/09/20. ACCESSION NUMBER(S): [id number] ORDERING CLINICIAN: DARIN CALVILLO TECHNIQUE:  Frontal chest was obtained at 2208 hours. FINDINGS: CARDIOMEDIASTINAL SILHOUETTE: Cardiomediastinal silhouette is normal in size and configuration.  LUNGS: Lungs are clear.  ABDOMEN: No remarkable upper abdominal findings.  BONES: No acute osseous changes.    No acute pulmonary pathology. Signed by Aram De Jesus MD    CT brain attack head wo IV contrast    Result Date: 9/2/2024  Interpreted By:  Gomez Rosenthal, STUDY: CT BRAIN ATTACK HEAD WO IV CONTRAST; CT BRAIN ATTACK ANGIO HEAD AND NECK W AND WO IV CONTRAST;  9/2/2024 8:11 pm; 9/2/2024 8:22 pm   INDICATION: Signs/Symptoms:Stroke Evaluation; Signs/Symptoms:left sided weakness   COMPARISON: None.   ACCESSION NUMBER(S): " [id number]; [id number]   ORDERING CLINICIAN: SHANKAR NESS   TECHNIQUE: Multiple contiguous axial noncontrast images of the head were obtained. Following IV contrast administration of iodinated contrast, a CT angiography of the head and neck was performed. MIPS and 3D reconstructions of the [place] and neck were created on an independent workstation and reviewed.   FINDINGS: NON-CONTRAST HEAD CT:   BRAIN PARENCHYMA:  No evidence of acute intraparenchymal hemorrhage. No mass-effect, midline shift or effacement of cerebral sulci. There is suggestion for slight loss of gray-white differentiation involving the right insula.   VENTRICLES and EXTRA-AXIAL SPACES: Along the right frontal lobe there is a 0.5 cm low-density subdural fluid collection (coronal image 31, 02/02/2004; sagittal image 40, series 203))measuring no greater than 0.5 cm concerning for subacute or chronic subdural hematoma. Otherwise no acute extra-axial hemorrhage. Ventricles and sulci are age-concordant.   PARANASAL SINUSES/MASTOIDS:  No hemorrhage or air-fluid levels within the visualized paranasal sinuses. The mastoids are well aerated.   CALVARIUM/ORBITS:  No skull fracture.  The orbits and globes are intact to the extent visualized.   EXTRACRANIAL SOFT TISSUES: No discernible abnormality.       CTA NECK:   Partially visualized left pleural effusion. There is pericardial fluid in the superior aortic recess.   LEFT VERTEBRAL ARTERY: Mild atherosclerosis of the origin. No hemodynamically significant stenosis, occlusion, or dissection.   LEFT COMMON/INTERNAL CAROTID ARTERY: Minimal atherosclerosis at the bulb. No hemodynamically significant stenosis, occlusion, or dissection.   RIGHT VERTEBRAL ARTERY: Mild atherosclerosis of the origin. No hemodynamically significant stenosis, occlusion, or dissection.   RIGHT COMMON/INTERNAL CAROTID ARTERY: Mild atherosclerosis at the bulb. No hemodynamically significant stenosis, occlusion, or  dissection.     The neck soft tissues show no evidence of mass, fluid collection, or enlarged lymph nodes.   There is no acute osseous abnormality.         CTA HEAD:   ANTERIOR CIRCULATION: No aneurysm.   - Internal Carotid Arteries: Minimal calcific atherosclerosis bilaterally. No hemodynamically significant stenosis or occlusion.   - Middle Cerebral Arteries: There is an acute complete occlusion involving the right M2 inferior division branch. It transiently reconstitutes but then again occludes at the M3 branch point and there is a paucity of M4 cortical opacification involving its territory throughout the right parietal and posterior temporal regions.   - Anterior Cerebral Arteries:  No hemodynamically significant stenosis or occlusion.     POSTERIOR CIRCULATION: No aneurysm.   - Intracranial Vertebral Arteries: Mild calcific atherosclerosis on the left. No hemodynamically significant stenosis or occlusion.   - Basilar Artery:  No hemodynamically significant stenosis or occlusion.   - Posterior Cerebral Arteries:  No hemodynamically significant stenosis or occlusion.   No arteriovenous malformation is visualized. No pathologic intracranial enhancement or discrete mass. The dural venous sinuses are patent.   MIPS and 3D reconstructions confirm the above findings.       Acute complete occlusion involving the right M2 inferior division branch. It transiently reconstitutes but then again occludes at the M3 branch point and there is a slight paucity of M4 cortical opacification involving its territory throughout the right parietal and posterior temporal regions.   Otherwise, no hemodynamically significant intracranial or extracranial stenosis, occlusion, or aneurysm.   Suspect a early loss in the gray-white differentiation of the insula.   Age-indeterminate subdural hematoma focally overlying the right frontal lobe measuring no greater than 0.5 cm in maximal thickness and demonstrates a density just above CSF,  favoring chronic or subacute aged.   Moderate-sized left pleural effusion.   MACRO: Gomez Rosenthal discussed the significance and urgency of this critical finding via [employer] with  SHANKAR NESS on 9/2/2024 at 8:23 pm.  (**-RCF-**) Findings:  See findings.   Signed by: Gomez Rosenthal 9/2/2024 8:37 PM Dictation workstation:   [id number]    CT brain attack angio head and neck W and WO IV contrast    Result Date: 9/2/2024  Interpreted By:  Gomez Rosenthal, STUDY: CT BRAIN ATTACK HEAD WO IV CONTRAST; CT BRAIN ATTACK ANGIO HEAD AND NECK W AND WO IV CONTRAST;  9/2/2024 8:11 pm; 9/2/2024 8:22 pm   INDICATION: Signs/Symptoms:Stroke Evaluation; Signs/Symptoms:left sided weakness   COMPARISON: None.   ACCESSION NUMBER(S): [id number]; [id number]   ORDERING CLINICIAN: SHANKAR NESS   TECHNIQUE: Multiple contiguous axial noncontrast images of the head were obtained. Following IV contrast administration of iodinated contrast, a CT angiography of the head and neck was performed. MIPS and 3D reconstructions of the [place] of [place] and neck were created on an independent workstation and reviewed.   FINDINGS: NON-CONTRAST HEAD CT:   BRAIN PARENCHYMA:  No evidence of acute intraparenchymal hemorrhage. No mass-effect, midline shift or effacement of cerebral sulci. There is suggestion for slight loss of gray-white differentiation involving the right insula.   VENTRICLES and EXTRA-AXIAL SPACES: Along the right frontal lobe there is a 0.5 cm low-density subdural fluid collection (coronal image 31, 02/02/2004; sagittal image 40, series 203))measuring no greater than 0.5 cm concerning for subacute or chronic subdural hematoma. Otherwise no acute extra-axial hemorrhage. Ventricles and sulci are age-concordant.   PARANASAL SINUSES/MASTOIDS:  No hemorrhage or air-fluid levels within the visualized paranasal sinuses. The mastoids are well aerated.   CALVARIUM/ORBITS:  No skull fracture.  The orbits and globes are intact to the  extent visualized.   EXTRACRANIAL SOFT TISSUES: No discernible abnormality.       CTA NECK:   Partially visualized left pleural effusion. There is pericardial fluid in the superior aortic recess.   LEFT VERTEBRAL ARTERY: Mild atherosclerosis of the origin. No hemodynamically significant stenosis, occlusion, or dissection.   LEFT COMMON/INTERNAL CAROTID ARTERY: Minimal atherosclerosis at the bulb. No hemodynamically significant stenosis, occlusion, or dissection.   RIGHT VERTEBRAL ARTERY: Mild atherosclerosis of the origin. No hemodynamically significant stenosis, occlusion, or dissection.   RIGHT COMMON/INTERNAL CAROTID ARTERY: Mild atherosclerosis at the bulb. No hemodynamically significant stenosis, occlusion, or dissection.     The neck soft tissues show no evidence of mass, fluid collection, or enlarged lymph nodes.   There is no acute osseous abnormality.         CTA HEAD:   ANTERIOR CIRCULATION: No aneurysm.   - Internal Carotid Arteries: Minimal calcific atherosclerosis bilaterally. No hemodynamically significant stenosis or occlusion.   - Middle Cerebral Arteries: There is an acute complete occlusion involving the right M2 inferior division branch. It transiently reconstitutes but then again occludes at the M3 branch point and there is a paucity of M4 cortical opacification involving its territory throughout the right parietal and posterior temporal regions.   - Anterior Cerebral Arteries:  No hemodynamically significant stenosis or occlusion.     POSTERIOR CIRCULATION: No aneurysm.   - Intracranial Vertebral Arteries: Mild calcific atherosclerosis on the left. No hemodynamically significant stenosis or occlusion.   - Basilar Artery:  No hemodynamically significant stenosis or occlusion.   - Posterior Cerebral Arteries:  No hemodynamically significant stenosis or occlusion.   No arteriovenous malformation is visualized. No pathologic intracranial enhancement or discrete mass. The dural venous sinuses are  patent.   MIPS and 3D reconstructions confirm the above findings.       Acute complete occlusion involving the right M2 inferior division branch. It transiently reconstitutes but then again occludes at the M3 branch point and there is a slight paucity of M4 cortical opacification involving its territory throughout the right parietal and posterior temporal regions.   Otherwise, no hemodynamically significant intracranial or extracranial stenosis, occlusion, or aneurysm.   Suspect a early loss in the gray-white differentiation of the insula.   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   Moderate-sized left pleural effusion.   MACRO: Gomez Rosenthal discussed the significance and urgency of this critical finding via [employer] with  SHANKAR NESS on 9/2/2024 at 8:23 pm.  (**-RCF-**) Findings:  See findings.   Signed by: Gomez Rosenthal 9/2/2024 8:37 PM Dictation workstation:   [id number]   Results for orders placed or performed during the hospital encounter of 09/02/24 (from the past 24 hour(s))   POCT GLUCOSE   Result Value Ref Range    POCT Glucose 219 (H) 74 - 99 mg/dL   CBC and Auto Differential   Result Value Ref Range    WBC 8.1 4.4 - 11.3 x10*3/uL    nRBC 0.0 0.0 - 0.0 /100 WBCs    RBC 2.44 (L) 4.50 - 5.90 x10*6/uL    Hemoglobin 7.7 (L) 13.5 - 17.5 g/dL    Hematocrit 24.5 (L) 41.0 - 52.0 %     80 - 100 fL    MCH 31.6 26.0 - 34.0 pg    MCHC 31.4 (L) 32.0 - 36.0 g/dL    RDW 16.2 (H) 11.5 - 14.5 %    Platelets 190 150 - 450 x10*3/uL    Neutrophils % 75.0 40.0 - 80.0 %    Immature Granulocytes %, Automated 0.4 0.0 - 0.9 %    Lymphocytes % 15.3 13.0 - 44.0 %    Monocytes % 6.8 2.0 - 10.0 %    Eosinophils % 2.3 0.0 - 6.0 %    Basophils % 0.2 0.0 - 2.0 %    Neutrophils Absolute 6.09 (H) 1.60 - 5.50 x10*3/uL    Immature Granulocytes Absolute,  Automated 0.03 0.00 - 0.50 x10*3/uL    Lymphocytes Absolute 1.24 0.80 - 3.00 x10*3/uL    Monocytes Absolute 0.55 0.05 - 0.80 x10*3/uL    Eosinophils Absolute 0.19 0.00 - 0.40 x10*3/uL    Basophils Absolute 0.02 0.00 - 0.10 x10*3/uL   Comprehensive metabolic panel   Result Value Ref Range    Glucose 209 (H) 74 - 99 mg/dL    Sodium 139 136 - 145 mmol/L    Potassium 4.1 3.5 - 5.3 mmol/L    Chloride 108 (H) 98 - 107 mmol/L    Bicarbonate 18 (L) 21 - 32 mmol/L    Anion Gap 17 10 - 20 mmol/L    Urea Nitrogen 60 (H) 6 - 23 mg/dL    Creatinine 3.07 (H) 0.50 - 1.30 mg/dL    eGFR 19 (L) >60 mL/min/1.73m*2    Calcium 8.8 8.6 - 10.3 mg/dL    Albumin 3.4 3.4 - 5.0 g/dL    Alkaline Phosphatase 104 33 - 136 U/L    Total Protein 5.9 (L) 6.4 - 8.2 g/dL    AST 14 9 - 39 U/L    Bilirubin, Total 0.3 0.0 - 1.2 mg/dL    ALT 18 10 - 52 U/L   Troponin I, High Sensitivity   Result Value Ref Range    Troponin I, High Sensitivity 143 (HH) 0 - 20 ng/L   Protime-INR   Result Value Ref Range    Protime 12.3 9.8 - 12.8 seconds    INR 1.1 0.9 - 1.1   APTT   Result Value Ref Range    aPTT 29 27 - 38 seconds   Lipid Panel   Result Value Ref Range    Cholesterol 89 0 - 199 mg/dL    HDL-Cholesterol 45.5 mg/dL    Cholesterol/HDL Ratio 2.0     LDL Calculated 33 <=99 mg/dL    VLDL 11 0 - 40 mg/dL    Triglycerides 54 0 - 149 mg/dL    Non HDL Cholesterol 44 0 - 149 mg/dL   Lavender Top   Result Value Ref Range    Extra Tube Hold for add-ons.    PST Top   Result Value Ref Range    Extra Tube Hold for add-ons.    B-Type Natriuretic Peptide   Result Value Ref Range     (H) 0 - 99 pg/mL   Troponin I, High Sensitivity   Result Value Ref Range    Troponin I, High Sensitivity 144 (HH) 0 - 20 ng/L       Assessment/Plan   Regulo is an 83-year-old male patient presenting to emergency department for evaluation of acute onset left-sided weakness, dysarthria, expressive aphasia.  Patient was in his normal state of health when he was out to dinner last evening and  acutely developed the symptoms.  Patient was on Eliquis 2/2 history of A-fib but taken off of his Eliquis on 8/12/2024 after developing a GI bleed.  Per CT patient found to have an echo acute M2 occlusion, deemed to unstable for airlift transport to [facility] per neurosurgery, admitted to [place] for further medical management, consult to neurology.    Acute stroke/elevated troponin/MARIMAR  Admit to SDU per Dr. Marcos Adam  See imaging results above  Consult neurology and appreciate input  Defer anticoagulation to neurology 2/2 recent GI bleed  Patient deemed too unstable for airlift transport to [facility] per neurosurgery  Neurochecks every 4 hours  Swallow evaluation  N.p.o. until passed swallow evaluation  Permissive hypertension  Echocardiogram ordered  PT/OT  Continue IV fluids  Protonix IV daily  Hold atorvastatin 2/2 MARIMAR  Hold nephrotoxic medications  Oxygen as needed  MRI brain without contrast ordered  Telemetry monitoring  Aspiration precautions  SLP  A.m. labs  Trend troponin    Diabetes/diverticulitis/hypertension/colon cancer  #Chronic conditions  N.p.o.  ISS for n.p.o.  Hypoglycemia protocol    DVT Ppx  SCDs  No chemical prophylaxis 2/2 recent GI bleed  Bedrest/out of bed with assistance  PT/OT            I spent 40 minutes in the professional and overall care of this patient.  Emmy Villagomez, APRN-CNP

## 2024-09-03 NOTE — SIGNIFICANT EVENT
This [facility] house officer was contacted by RN Collette notifying me that the patient was retaining urine greater than 700 cc.  Straight cath ordered for patient.  UA to be sent.  Flomax initiated.  Bladder scans also ordered to monitor for further retention.

## 2024-09-03 NOTE — PROGRESS NOTES
As patient was about to leave a critical care transport or received [place] [facility] [place]  They discussed with interventional radiology and given patient's M2 reconstitutes distally and the risk of a concomitant age-indeterminate subdural hematoma the benefits do not outweigh the risks and intervention.  It is recommended the patient stay at [facility].  Discussed the case with Dr. Adam who is on-call for Dr. Casarez and the patient will be admitted to stepdown.  On repeat assessment patient is maintaining his airway and oxygenating well and alert to verbal.

## 2024-09-04 LAB
AMORPH CRY #/AREA UR COMP ASSIST: ABNORMAL /HPF
ANION GAP SERPL CALC-SCNC: 11 MMOL/L (ref 10–20)
APPEARANCE UR: ABNORMAL
BASOPHILS # BLD AUTO: 0.02 X10*3/UL (ref 0–0.1)
BASOPHILS NFR BLD AUTO: 0.2 %
BILIRUB UR STRIP.AUTO-MCNC: NEGATIVE MG/DL
BUN SERPL-MCNC: 50 MG/DL (ref 6–23)
CALCIUM SERPL-MCNC: 8.9 MG/DL (ref 8.6–10.3)
CHLORIDE SERPL-SCNC: 115 MMOL/L (ref 98–107)
CO2 SERPL-SCNC: 20 MMOL/L (ref 21–32)
COLOR UR: ABNORMAL
CREAT SERPL-MCNC: 2.36 MG/DL (ref 0.5–1.3)
EGFRCR SERPLBLD CKD-EPI 2021: 26 ML/MIN/1.73M*2
EOSINOPHIL # BLD AUTO: 0.14 X10*3/UL (ref 0–0.4)
EOSINOPHIL NFR BLD AUTO: 1.7 %
ERYTHROCYTE [DISTWIDTH] IN BLOOD BY AUTOMATED COUNT: 16.1 % (ref 11.5–14.5)
GLUCOSE BLD MANUAL STRIP-MCNC: 107 MG/DL (ref 74–99)
GLUCOSE BLD MANUAL STRIP-MCNC: 115 MG/DL (ref 74–99)
GLUCOSE BLD MANUAL STRIP-MCNC: 123 MG/DL (ref 74–99)
GLUCOSE BLD MANUAL STRIP-MCNC: 128 MG/DL (ref 74–99)
GLUCOSE BLD MANUAL STRIP-MCNC: 131 MG/DL (ref 74–99)
GLUCOSE SERPL-MCNC: 115 MG/DL (ref 74–99)
GLUCOSE UR STRIP.AUTO-MCNC: NORMAL MG/DL
HCT VFR BLD AUTO: 24.1 % (ref 41–52)
HGB BLD-MCNC: 7.5 G/DL (ref 13.5–17.5)
IMM GRANULOCYTES # BLD AUTO: 0.03 X10*3/UL (ref 0–0.5)
IMM GRANULOCYTES NFR BLD AUTO: 0.4 % (ref 0–0.9)
KETONES UR STRIP.AUTO-MCNC: NEGATIVE MG/DL
LEUKOCYTE ESTERASE UR QL STRIP.AUTO: ABNORMAL
LYMPHOCYTES # BLD AUTO: 0.68 X10*3/UL (ref 0.8–3)
LYMPHOCYTES NFR BLD AUTO: 8.3 %
MCH RBC QN AUTO: 31.4 PG (ref 26–34)
MCHC RBC AUTO-ENTMCNC: 31.1 G/DL (ref 32–36)
MCV RBC AUTO: 101 FL (ref 80–100)
MONOCYTES # BLD AUTO: 0.46 X10*3/UL (ref 0.05–0.8)
MONOCYTES NFR BLD AUTO: 5.6 %
MUCOUS THREADS #/AREA URNS AUTO: ABNORMAL /LPF
NEUTROPHILS # BLD AUTO: 6.84 X10*3/UL (ref 1.6–5.5)
NEUTROPHILS NFR BLD AUTO: 83.8 %
NITRITE UR QL STRIP.AUTO: NEGATIVE
NRBC BLD-RTO: 0.2 /100 WBCS (ref 0–0)
PH UR STRIP.AUTO: 5.5 [PH]
PLATELET # BLD AUTO: 197 X10*3/UL (ref 150–450)
POTASSIUM SERPL-SCNC: 4.2 MMOL/L (ref 3.5–5.3)
PROT UR STRIP.AUTO-MCNC: ABNORMAL MG/DL
RBC # BLD AUTO: 2.39 X10*6/UL (ref 4.5–5.9)
RBC # UR STRIP.AUTO: ABNORMAL /UL
RBC #/AREA URNS AUTO: ABNORMAL /HPF
SODIUM SERPL-SCNC: 142 MMOL/L (ref 136–145)
SP GR UR STRIP.AUTO: 1.02
UROBILINOGEN UR STRIP.AUTO-MCNC: NORMAL MG/DL
WBC # BLD AUTO: 8.2 X10*3/UL (ref 4.4–11.3)
WBC #/AREA URNS AUTO: >50 /HPF
WBC CLUMPS #/AREA URNS AUTO: ABNORMAL /HPF

## 2024-09-04 PROCEDURE — 85025 COMPLETE CBC W/AUTO DIFF WBC: CPT | Performed by: INTERNAL MEDICINE

## 2024-09-04 PROCEDURE — 82947 ASSAY GLUCOSE BLOOD QUANT: CPT

## 2024-09-04 PROCEDURE — 2500000004 HC RX 250 GENERAL PHARMACY W/ HCPCS (ALT 636 FOR OP/ED)

## 2024-09-04 PROCEDURE — 81001 URINALYSIS AUTO W/SCOPE: CPT

## 2024-09-04 PROCEDURE — 82374 ASSAY BLOOD CARBON DIOXIDE: CPT | Performed by: INTERNAL MEDICINE

## 2024-09-04 PROCEDURE — 97535 SELF CARE MNGMENT TRAINING: CPT | Mod: GP,CQ

## 2024-09-04 PROCEDURE — 92523 SPEECH SOUND LANG COMPREHEN: CPT | Mod: GN | Performed by: SPEECH-LANGUAGE PATHOLOGIST

## 2024-09-04 PROCEDURE — 2500000001 HC RX 250 WO HCPCS SELF ADMINISTERED DRUGS (ALT 637 FOR MEDICARE OP): Performed by: PSYCHIATRY & NEUROLOGY

## 2024-09-04 PROCEDURE — 99232 SBSQ HOSP IP/OBS MODERATE 35: CPT | Performed by: PSYCHIATRY & NEUROLOGY

## 2024-09-04 PROCEDURE — 2500000004 HC RX 250 GENERAL PHARMACY W/ HCPCS (ALT 636 FOR OP/ED): Performed by: INTERNAL MEDICINE

## 2024-09-04 PROCEDURE — 92526 ORAL FUNCTION THERAPY: CPT | Mod: GN | Performed by: SPEECH-LANGUAGE PATHOLOGIST

## 2024-09-04 PROCEDURE — 87086 URINE CULTURE/COLONY COUNT: CPT | Mod: PARLAB

## 2024-09-04 PROCEDURE — 97535 SELF CARE MNGMENT TRAINING: CPT | Mod: CO,GO

## 2024-09-04 PROCEDURE — 36415 COLL VENOUS BLD VENIPUNCTURE: CPT | Performed by: INTERNAL MEDICINE

## 2024-09-04 PROCEDURE — 2060000001 HC INTERMEDIATE ICU ROOM DAILY

## 2024-09-04 PROCEDURE — 2500000004 HC RX 250 GENERAL PHARMACY W/ HCPCS (ALT 636 FOR OP/ED): Performed by: NURSE PRACTITIONER

## 2024-09-04 PROCEDURE — 2500000001 HC RX 250 WO HCPCS SELF ADMINISTERED DRUGS (ALT 637 FOR MEDICARE OP): Performed by: INTERNAL MEDICINE

## 2024-09-04 RX ORDER — DEXTROSE MONOHYDRATE AND SODIUM CHLORIDE 5; .45 G/100ML; G/100ML
75 INJECTION, SOLUTION INTRAVENOUS CONTINUOUS
Status: DISCONTINUED | OUTPATIENT
Start: 2024-09-04 | End: 2024-09-05

## 2024-09-04 RX ORDER — MORPHINE SULFATE 2 MG/ML
2 INJECTION, SOLUTION INTRAMUSCULAR; INTRAVENOUS EVERY 2 HOUR PRN
Status: DISCONTINUED | OUTPATIENT
Start: 2024-09-04 | End: 2024-09-08 | Stop reason: HOSPADM

## 2024-09-04 ASSESSMENT — PAIN SCALES - GENERAL
PAINLEVEL_OUTOF10: 0 - NO PAIN
PAINLEVEL_OUTOF10: 2
PAINLEVEL_OUTOF10: 0 - NO PAIN
PAINLEVEL_OUTOF10: 2

## 2024-09-04 ASSESSMENT — PAIN DESCRIPTION - LOCATION
LOCATION: BACK
LOCATION: HEAD

## 2024-09-04 ASSESSMENT — COGNITIVE AND FUNCTIONAL STATUS - GENERAL
MOVING FROM LYING ON BACK TO SITTING ON SIDE OF FLAT BED WITH BEDRAILS: A LITTLE
DRESSING REGULAR UPPER BODY CLOTHING: TOTAL
TURNING FROM BACK TO SIDE WHILE IN FLAT BAD: TOTAL
MOBILITY SCORE: 11
MOVING TO AND FROM BED TO CHAIR: TOTAL
WALKING IN HOSPITAL ROOM: TOTAL
DRESSING REGULAR LOWER BODY CLOTHING: TOTAL
MOVING TO AND FROM BED TO CHAIR: TOTAL
TURNING FROM BACK TO SIDE WHILE IN FLAT BAD: TOTAL
CLIMB 3 TO 5 STEPS WITH RAILING: TOTAL
MOBILITY SCORE: 9
STANDING UP FROM CHAIR USING ARMS: A LOT
TURNING FROM BACK TO SIDE WHILE IN FLAT BAD: A LITTLE
STANDING UP FROM CHAIR USING ARMS: TOTAL
TOILETING: TOTAL
MOVING TO AND FROM BED TO CHAIR: TOTAL
CLIMB 3 TO 5 STEPS WITH RAILING: TOTAL
MOBILITY SCORE: 9
EATING MEALS: A LITTLE
PERSONAL GROOMING: TOTAL
TOILETING: TOTAL
EATING MEALS: TOTAL
PERSONAL GROOMING: TOTAL
DAILY ACTIVITIY SCORE: 12
STANDING UP FROM CHAIR USING ARMS: TOTAL
HELP NEEDED FOR BATHING: A LOT
WALKING IN HOSPITAL ROOM: TOTAL
DAILY ACTIVITIY SCORE: 6
DAILY ACTIVITIY SCORE: 6
EATING MEALS: TOTAL
DRESSING REGULAR UPPER BODY CLOTHING: TOTAL
HELP NEEDED FOR BATHING: TOTAL
DRESSING REGULAR LOWER BODY CLOTHING: TOTAL
HELP NEEDED FOR BATHING: TOTAL
PERSONAL GROOMING: A LITTLE
TOILETING: TOTAL
DRESSING REGULAR LOWER BODY CLOTHING: TOTAL
CLIMB 3 TO 5 STEPS WITH RAILING: TOTAL
WALKING IN HOSPITAL ROOM: TOTAL
DRESSING REGULAR UPPER BODY CLOTHING: A LOT

## 2024-09-04 ASSESSMENT — PAIN DESCRIPTION - ORIENTATION
ORIENTATION: LOWER
ORIENTATION: RIGHT

## 2024-09-04 ASSESSMENT — PAIN - FUNCTIONAL ASSESSMENT
PAIN_FUNCTIONAL_ASSESSMENT: UNABLE TO SELF-REPORT
PAIN_FUNCTIONAL_ASSESSMENT: UNABLE TO SELF-REPORT
PAIN_FUNCTIONAL_ASSESSMENT: 0-10

## 2024-09-04 ASSESSMENT — ACTIVITIES OF DAILY LIVING (ADL): HOME_MANAGEMENT_TIME_ENTRY: 15

## 2024-09-04 ASSESSMENT — PAIN SCALES - WONG BAKER
WONGBAKER_NUMERICALRESPONSE: HURTS LITTLE BIT
WONGBAKER_NUMERICALRESPONSE: HURTS LITTLE BIT

## 2024-09-04 NOTE — CARE PLAN
The patient's goals for the shift include      The clinical goals for the shift include comfort    Problem: Skin  Goal: Participates in plan/prevention/treatment measures  Outcome: Progressing  Flowsheets (Taken 9/4/2024 1536)  Participates in plan/prevention/treatment measures: Elevate heels  Goal: Prevent/manage excess moisture  Outcome: Progressing  Flowsheets (Taken 9/4/2024 1536)  Prevent/manage excess moisture: Moisturize dry skin  Goal: Prevent/minimize sheer/friction injuries  Outcome: Progressing  Flowsheets (Taken 9/4/2024 1536)  Prevent/minimize sheer/friction injuries: Increase activity/out of bed for meals  Goal: Promote/optimize nutrition  Outcome: Progressing  Flowsheets (Taken 9/4/2024 1536)  Promote/optimize nutrition: Discuss with provider if NPO > 2 days  Goal: Promote skin healing  Outcome: Progressing  Flowsheets (Taken 9/4/2024 1536)  Promote skin healing: Turn/reposition every 2 hours/use positioning/transfer devices

## 2024-09-04 NOTE — PROGRESS NOTES
"Regulo Marques is a 83 y.o. 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    Subjective   Seen at bedside. Continues to have significant dysthria. Has failed his swallow evaluation again today.        Objective     Last Recorded Vitals  Blood pressure 149/69, pulse 57, temperature 36.6 °C (97.9 °F), temperature source Temporal, resp. rate 18, height 1.753 m (5' 9.02\"), weight 93.2 kg (205 lb 7.5 oz), SpO2 99%.    Physical Exam  Neurological Exam  Relevant Results    NIH Stroke Scale  1A. Level of Consciousness: Alert, Keenly Responsive  1B. Ask Month and Age: 1 Question Right  1C. Blink Eyes & Squeeze Hands: Performs 1 Task  2. Best Gaze: Partial Gaze Palsy  3. Visual: Partial Hemianopia  4. Facial Palsy: Partial Paralysis  5A. Motor - Left Arm: Some Effort Against Gravity  5B. Motor - Right Arm: No Drift  6A. Motor - Left Leg: No Drift  6B. Motor - Right Leg: No Drift  7. Limb Ataxia: Present in One Limb  8. Sensory Loss: Mild-to-Moderate Sensory Loss  9. Best Language: Mild-to-Moderate Aphasia  10. Dysarthria: Mild-to-Moderate Dysarthria  11. Extinction and Inattention: Visual, Tactile, Auditory, Spatial, or Personal Inattention  NIH Stroke Scale: 13           [place] Coma Scale  Best Eye Response: Spontaneous  Best Verbal Response: None  Best Motor Response: Follows commands  [place] Coma Scale Score: 11                                Assessment/Plan   This patient currently has cardiac telemetry ordered; if you would like to modify or discontinue the telemetry order, click here to go to the orders activity to modify/discontinue the order.  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 Marques is a 83 year old man whom presented with acute onset of left sided weakness and dysthria, found to have a right inferior MCA stroke with M2 occlusion.  Unable to get TNK due to recent GI bleed and to high risk " for thrombectomy.  Etiology of stroke likely cardioembolic from a. Fib. Patient off anti-coagulation due to recent GI hemorrhage. Per family patient was having issues with bleeding -both from bladder and GI tract - despite eliquis being lowered to 2.5 mg BID.  Discussed restarting anti-coagulation with GI team.  Etiology of hemorrhage unclear based on work up performed by [facility] team.       Will need MBS tomorrow and if fails then peg tube placement. Would recommend NG placement in the meantime to get nutrition and medications.      If Gi and cardiology are agreeable to restarting anti-coagulation, then recommend to restart 7 days after stroke onset to lower risk of hemorrhagic conversion. Would stop aspirin once started to lower bleeding risk. Will need close monitoring of CBC on medication and quick evaluation for watchmen device so that time on anti-coagulation is as short as possible.         I spent 40 minutes in the professional and overall care of this patient.      Laly Rodriguez, DO

## 2024-09-04 NOTE — PROGRESS NOTES
Speech-Language Pathology    SLP Adult Inpatient  Speech-Language Pathology Treatment     Patient Name: Regulo Marques  MRN: [id number]  Today's Date: 9/4/2024  Time Calculation  Start Time: 0845  Stop Time: 0902  Time Calculation (min): 17 min     Current Problem:   1. 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  Transthoracic Echo (TTE) Complete    Transthoracic Echo (TTE) Complete      2. Encounter for other specified special examinations  Transthoracic Echo (TTE) Complete        Therapeutic Swallow:  Therapeutic Swallow Intervention : PO Trials, Caregiver Education, Compensatory Strategies  Solid Diet Recommendations: NPO, alternative form of nutrition/hydration/meds  Liquid Diet Recommendations: NPO, alternative form of hydration.     SLP Re-Assessment/tx at bedside, 9/4/24 per POC:  Ongoing oral dysphagia, reduced sensation and movement left side of oral cavity, lingual protrusion to left side, left labial weakness/droop. Pt is able to contain ice chips and liquids in mouth, no drooling or anterior loss of PO trials.    PO trials ice chips and water, with ongoing coughing noted, with minimal-no  laryngeal rise noted on palpation, swallow appears significantly delayed. Pt notices this and reports he still cannot swallow fast/strong as needed for an oral diet.   HIGH ASPIRATION RISK in current condition, given clinical bedside indicators.   Ongoing dysphagia therapy for pharyngeal swallow, effortful swallow training and trials today.  Suggest NPO with alternative form of nutrition/hydration, meds.    SLP TX Intervention Outcome: No Progress Made  Prognosis: Guarded  Treatment Tolerance: Patient tolerated treatment well  Medical Staff Made Aware: Yes  Strengths: Family/Caregiver Support, Motivation, Cognition  Barriers: Comorbidities (stroke)  Education Provided: Yes     Plan:  SLP TX Plan: Continue Plan of Care  SLP Frequency: 3x per week  Duration: 2 weeks  SLP Discharge Recommendations:  Continue skilled speech therapy services post discharge (see subsequent notes for any changes.)  Discussed POC: Patient, Nursing  Discussed Risks/Benefits: Yes, Patient, Nursing  Patient/Caregiver Agreeable: Yes    Dysphagia Goals:  start date 9/3  Patient will participate in re-assessment of swallowing with SLP for possible diet advancement from NPO as indicated.  9/4/24 ongoing  When appropriate, patient will participate in Modified Barium Swallow (MBS) study due to oral dysphagia and/or suspected pharyngeal dysphagia.   9/4 ongoing severe dysphagia at bedside, ongoing.    Pt/caregiver education.  9/4 ongoing, pt and RN today.     Subjective   Pt seen bedside, he is upright, pleasant, cooperative. Speech remains severely dysarthric.  Oral care was provided by RN prior to this session. Pt has dry mouth, oral moisture provided.     General Visit Information:   Prior to Session Communication: Bedside nurse (RN Collette)    Inpatient:  Education Documentation  SLP has provided pt and caregiver/RN Collette with the results and recommendations of this session.

## 2024-09-04 NOTE — CONSULTS
"Nutrition Initial Assessment:   Nutrition Assessment    Reason for Assessment: Admission nursing screening (MST=4 for weight loss and decreased PO intakes)    Patient is a 83 y.o. male presenting with acute stroke    PmHx: GIB, DM, UTI, HTN, barretts esophagus, diverticulitis, expressive aphasia, colon cancer s/p colectomy, multiple SBO, unresectable rectal cancer s/p chemo and radiation, prostate cancer.     Nutrition History:  Energy Intake: Poor < 50 % (NPO)  Food and Nutrient History: Pt was working with therapy at time of attempted visit today.  SLP recommending NPO with alternate means of nutrition due to oral and pharyngeal dysphagia.  Food Allergies/Intolerances:  None  GI Symptoms:  LYNDA  Oral Problems: Swallowing difficulty       Anthropometrics:  Height: 175.3 cm (5' 9.02\")   Weight: 93.2 kg (205 lb 7.5 oz)   BMI (Calculated): 30.33  IBW/kg (Dietitian Calculated): 72.7 kg  Percent of IBW: 128 %       Weight History:     Weight Change %:  Weight History / % Weight Change: no weight hx in EMR archives    Nutrition Focused Physical Exam Findings:  defer: therapy  Subcutaneous Fat Loss:   Orbital Fat Pads: Defer  Muscle Wasting:     Edema:  Edema: none  Physical Findings:  Skin: Negative    Nutrition Significant Labs:  CBC Trend:   Results from last 7 days   Lab Units 09/04/24  0541 09/03/24  1357 09/02/24 2017   WBC AUTO x10*3/uL 8.2 8.4 8.1   RBC AUTO x10*6/uL 2.39* 2.49* 2.44*   HEMOGLOBIN g/dL 7.5* 7.9* 7.7*   HEMATOCRIT % 24.1* 24.8* 24.5*   MCV fL 101* 100 100   PLATELETS AUTO x10*3/uL 197 193 190    , BMP Trend:   Results from last 7 days   Lab Units 09/04/24  0540 09/03/24  1357 09/02/24 2017   GLUCOSE mg/dL 115* 125* 209*   CALCIUM mg/dL 8.9 9.0 8.8   SODIUM mmol/L 142 140 139   POTASSIUM mmol/L 4.2 4.2 4.1   CO2 mmol/L 20* 20* 18*   CHLORIDE mmol/L 115* 111* 108*   BUN mg/dL 50* 57* 60*   CREATININE mg/dL 2.36* 2.67* 3.07*    , A1C:  Lab Results   Component Value Date    HGBA1C 5.7 (H) 09/02/2024 "   , BG POCT trend:   Results from last 7 days   Lab Units 09/04/24  1204 09/04/24  0323 09/03/24  2325 09/03/24  1947 09/03/24  1749   POCT GLUCOSE mg/dL 107* 115* 120* 122* 124*        Nutrition Specific Medications:  Reviewed     I/O:   Last BM Date: 09/03/24;      Dietary Orders (From admission, onward)       Start     Ordered    09/02/24 2227  NPO Diet; Effective now  Diet effective now        Comments: Until Swallow Assessment completed by RN or provider    09/02/24 2226                     Estimated Needs:      Method for Estimating Needs: 1700-1900kcals (18-20kcals/kg ABW)     Method for Estimating Needs: 56-74g (0.6-0.8g/kg ABW)     Method for Estimating Needs: 1 mL/kcal or as per MD        Nutrition Diagnosis   Malnutrition Diagnosis  Patient has Malnutrition Diagnosis:  (unable to determine at this time)    Nutrition Diagnosis  Patient has Nutrition Diagnosis: Yes  Diagnosis Status (1): New  Nutrition Diagnosis 1: Inadequate protein energy intake  Related to (1): oropharyngeal dysphagia, new stroke  As Evidenced by (1): NPO  Additional Nutrition Diagnosis: Diagnosis 2  Diagnosis Status (2): New  Nutrition Diagnosis 2: Increased nutrient needs  Related to (2): physiological causes increasing nutrient needs  As Evidenced by (2): unresectable rectal cancer s/p chemo and radiation       Nutrition Interventions/Recommendations         Nutrition Prescription:  Individualized Nutrition Prescription Provided for : If patient is unable to safely take PO, suggest dobhoff placement vs PEG depending on length of support needed.  If feeding tube placed, suggest Jevity 1.5 with goal rate of 50ml/hr for 1800kcals, 76gm protein, 912ml free water; flush with 225ml water q6h.        Nutrition Interventions:             Nutrition Education:   N/A       Nutrition Monitoring and Evaluation   Food/Nutrient Related History Monitoring  Additional Plans: ability to advance to oral diet vs feeding tube placement vs GOC  discussions.    Body Composition/Growth/Weight History  Monitoring and Evaluation Plan: Weight  Weight: Measured weight  Criteria: reweigh at least every 5 days    Biochemical Data, Medical Tests and Procedures  Monitoring and Evaluation Plan: Electrolyte/renal panel, Glucose/endocrine profile  Criteria: labs WNL  Criteria: BG within acceptable range    Nutrition Focused Physical Findings  Other: BM at least every 2-3 days         Time Spent (min): 45 minutes

## 2024-09-04 NOTE — PROGRESS NOTES
9/4/2024  Received call from Isabelle archibald from [facility]- stated they are not able to accept at this time- but will wait for updated therapy and re-assess.   Emmy Morillo RN T[facility]    9/4/2024 1256  No family in room.  Called and spoke with pt's wife, introduced self. Discussed having a back up choice if acute rehab does not take.  Stated she would like [facility] as back up choice.  Will leave snf list in room.  Stated she will be moving into [facility] independent living soon- she is hoping in the next week. Questions answered and support provided.  Asked DSC to make referral.   Emmy Morillo RN T[facility]

## 2024-09-04 NOTE — PROGRESS NOTES
Physical Therapy    Physical Therapy Treatment    Patient Name: Regulo Marques  MRN: [id number]  Today's Date: 9/4/2024  Time Calculation  Start Time: 1027  Stop Time: 1052  Time Calculation (min): 25 min  824-A       Assessment/Plan   PT Assessment  End of Session Patient Position: Bed, 3 rail up, Alarm off, not on at start of session     PT Plan  Treatment/Interventions: Bed mobility, Transfer training, Gait training  PT Plan: Ongoing PT  PT Frequency: 5 times per week  PT Discharge Recommendations: High intensity level of continued care  PT - OK to Discharge: Yes      General Visit Information:   PT  Visit  PT Received On: 09/04/24  General  Co-Treatment: co-tx with OT to ensure safe therapeutic tx to facilitate maximum participation with skilled intervention  Prior to Session Communication: Bedside nurse  Patient Position Received: Bed, 4 rail up, Alarm off, not on at start of session (communicated to nursing staff regarding 4 bedrails)  General Comment:  (pt pleasant and agreeable to participate in therapy session.  dysarthric speech.  moves somewhat impulsively at times.)    Subjective   Precautions:  Precautions  Medical Precautions: Fall precautions  Precautions Comment:  (L-side weakness.  IV.  moctezuma catheter.)        Objective      Treatments:  Therapeutic Exercise  Therapeutic Exercise Performed:  (seated BLE LAQ and RLE AP;  cuing to complete LLE AP as well however pt not performing and instead attempting to stand.)    Bed Mobility  Bed Mobility:  (sup > sit with min A x 1.  sit > sup with mod A x 1.  pt also able to roll R with SBA.)    Ambulation/Gait Training  Ambulation/Gait Training Performed:  (pt impulsively beginning to attempt sidestepping - able to perform a few small staggered marches however very unsteady and stepping on lines.  unable to safely progress any further at this time 2/2 poor balance as well as increased dizziness during static stance.)    Transfers  Transfer:  (sit <> stand x3  trials with FWW ad max A x 2.  cuing for sequencing with manual assist needed for placing/maintaining L hand on walker handle.  pt able to maintain static stance up to 1 min. per trial though with heavy L side lean requiring mod/max A on pt's R side plus CGA for safety on pt's L.  pt also with c/o increased dizziness while standing; vitals taken and WNL.)    Outcome Measures:  [facility] Basic Mobility  Turning from your back to your side while in a flat bed without using bedrails: A little  Moving from lying on your back to sitting on the side of a flat bed without using bedrails: A little  Moving to and from bed to chair (including a wheelchair): Total  Standing up from a chair using your arms (e.g. wheelchair or bedside chair): A lot  To walk in hospital room: Total  Climbing 3-5 steps with railing: Total  Basic Mobility - Total Score: 11    Education Documentation  Precautions, taught by Courtney Arriaza PTA at 9/4/2024 12:03 PM.  Learner: Patient  Readiness: Acceptance  Method: Explanation  Response: Verbalizes Understanding, Needs Reinforcement    Mobility Training, taught by Courtney Arriaza PTA at 9/4/2024 12:03 PM.  Learner: Patient  Readiness: Acceptance  Method: Explanation  Response: Verbalizes Understanding, Needs Reinforcement    Education Comments  No comments found.        EDUCATION:       Encounter Problems       Encounter Problems (Active)       PT Problem       STG - Pt will transition supine <> sitting with mod A x 1  (Progressing)       Start:  09/03/24    Expected End:  09/17/24            STG - Pt will transfer STS with mod A x 1  (Progressing)       Start:  09/03/24    Expected End:  09/17/24            STG - Pt will amb 15' using RW with min-mod A x 2  (Progressing)       Start:  09/03/24    Expected End:  09/17/24

## 2024-09-04 NOTE — CONSULTS
Consults  Impression  Acute CVA  Chronic atrial fibrillation. CHADS Vasc 5  Recent GI bleed, requiring transfusion  Hx of hematuria  Hx of Colon cancer  HTN    Plan:  Atrial fibrillation: Given acute CVA, had recent endoscopy which did not show any obvious source of bleeding other than gastritis.  Would recommend initiating systemic anticoagulation, once okay from the GI standpoint.     Patient will benefit from left atrial appendage occlusion using Watchman device.  Will have patient follow-up in the office/outpatient in an expedited manner.     Will have to further discussed with the patient when he is more able to cooperate the conversation and discuss. with family.     CVA: Patient went from systemic anticoagulation given history of atrial fibrillation 1 2 given the GI standpoint as well as high intensity statin therapy.  Echocardiogram Showed negative bubble study.    Troponin elevation supply demand ischemia    History Of Present Illness:    Regulo Marques is a 83 y.o. male presenting with Sudden onset left-sided weakness expressive aphasia and dysarthria he subsidy found to have right-sided CVA. Patient was recently hospitalized after [facility] in mid August with GI bleed with hemoglobin of 6 underwent endoscopy which showed gastritis but no obvious source of bleeding.    Of note he also had issues with hematuria    Review of systems is limited given patient's medical condition     Last Recorded Vitals:  Vitals:    09/03/24 1949 09/03/24 2319 09/04/24 0313 09/04/24 0808   BP: 122/59 122/60 111/57 131/60   BP Location:  Right arm  Right arm   Patient Position:  Lying  Lying   Pulse: 60 65 60 68   Resp: 18 18  20   Temp: 36.5 °C (97.7 °F) 36.4 °C (97.5 °F) 36.5 °C (97.7 °F) 37 °C (98.6 °F)   TempSrc: Temporal Temporal  Temporal   SpO2: 93% 99% 94% 100%   Weight:       Height:           Last Labs:  CBC - 9/4/2024:  5:41 AM  8.2 7.5 197    24.1      CMP - 9/4/2024:  5:40 AM  8.9 5.9 14 --- 0.3   _ 3.4 18  104      PTT - 9/2/2024:  8:17 PM  1.1   12.3 29     Troponin I, High Sensitivity   Date/Time Value Ref Range Status   09/03/2024 06:53  (HH) 0 - 20 ng/L Final     Comment:     Previous result verified on 9/2/2024 2100 on specimen/case [id number] called with component TRPHS for procedure Troponin I, High Sensitivity with value 143 ng/L.   09/02/2024 10:08  (HH) 0 - 20 ng/L Final     Comment:     Previous result verified on 9/2/2024 2100 on specimen/case [id number] called with component TRPHS for procedure Troponin I, High Sensitivity with value 143 ng/L.   09/02/2024 08:17  (HH) 0 - 20 ng/L Final     BNP   Date/Time Value Ref Range Status   09/02/2024 08:20  (H) 0 - 99 pg/mL Final     Hemoglobin A1C   Date/Time Value Ref Range Status   09/02/2024 08:20 PM 5.7 (H) see below % Final   11/08/2023 07:51 AM 6.6 (H) 4.3 - 5.6 % Final     Comment:     American Diabetes Association guidelines indicate that patients with HgbA1c in the range 5.7-6.4% are at increased risk for development of diabetes, and intervention by lifestyle modification may be beneficial. HgbA1c greater or equal to 6.5% is considered diagnostic of diabetes.     LDL Calculated   Date/Time Value Ref Range Status   09/02/2024 08:17 PM 33 <=99 mg/dL Final     Comment:                                 Near   Borderline      AGE      Desirable  Optimal    High     High     Very High     0-19 Y     0 - 109     ---    110-129   >/= 130     ----    20-24 Y     0 - 119     ---    120-159   >/= 160     ----      >24 Y     0 -  99   100-129  130-159   160-189     >/=190       VLDL   Date/Time Value Ref Range Status   09/02/2024 08:17 PM 11 0 - 40 mg/dL Final      Last I/O:  I/O last 3 completed shifts:  In: 1192.5 (14 mL/kg) [I.V.:1142.5 (13.4 mL/kg); IV Piggyback:50]  Out: 1075 (12.6 mL/kg) [Urine:1075 (0.4 mL/kg/hr)]  Dosing Weight: 85 kg         Ejection Fractions:  EF   Date/Time Value Ref Range Status   09/03/2024 12:39 PM 68 %             Past Medical History:  He has a past medical history of Diverticulitis of intestine, part unspecified, without perforation or abscess without bleeding, Personal history of malignant neoplasm, unspecified, Personal history of other diseases of the circulatory system, Personal history of other diseases of the digestive system, Personal history of other endocrine, nutritional and metabolic disease, Personal history of other malignant neoplasm of large intestine, and Personal history of other specified conditions.    Past Surgical History:  He has a past surgical history that includes Colonoscopy (10/20/2016); Total hip arthroplasty (10/20/2016); Tonsillectomy (10/20/2016); Other surgical history (11/08/2019); and Other surgical history (11/08/2019).      Social History:  He reports that he has quit smoking. His smoking use included cigarettes. He has never used smokeless tobacco. He reports that he does not drink alcohol and does not use drugs.    Family History:  No family history on file.     Allergies:  Codeine    Inpatient Medications:  Scheduled medications   Medication Dose Route Frequency    aspirin  150 mg rectal Daily    [Held by provider] atorvastatin  80 mg oral Nightly    cefTRIAXone  1 g intravenous q24h    insulin lispro  0-10 Units subcutaneous q4h    pantoprazole  40 mg intravenous Daily    tamsulosin  0.4 mg oral Daily     PRN medications   Medication    acetaminophen    dextrose    glucagon    metoprolol    oxygen     Continuous Medications   Medication Dose Last Rate    sodium chloride 0.9%  50 mL/hr 50 mL/hr (09/04/24 0604)     Outpatient Medications:  Current Outpatient Medications   Medication Instructions    allopurinol (ZYLOPRIM) 300 mg, oral, Daily    amLODIPine (NORVASC) 2.5 mg, oral, Daily    hydroCHLOROthiazide (MICROZIDE) 12.5 mg, oral, Daily    olmesartan (BENICAR) 40 mg, oral, Nightly    tamsulosin (FLOMAX) 0.4 mg, oral, Daily    vit A/vit C/vit E/zinc/copper (PRESERVISION  AREDS ORAL) 1 tablet, oral, 2 times daily       Physical Exam:  Patient is sleeping looks with no acute distress wakes up during the physical exam however he will go back to sleep unable to answer any questions heart sounds were irregular lungs are clear but has poor inspiratory effort the abdomen is soft there is no lower extremity edema    Peripheral IV 09/02/24 18 G Right Forearm (Active)   Site Assessment Clean;Dry;Intact 09/04/24 0832   Dressing Status Dry;Clean 09/04/24 0832   Number of days: 2       Peripheral IV 09/02/24 18 G Left Antecubital (Active)   Site Assessment Clean;Dry;Intact 09/04/24 0832   Dressing Status Clean;Dry 09/04/24 0832   Number of days: 2       Urethral Catheter Double-lumen 16 Fr. (Active)   Site Assessment Clean;Skin intact 09/04/24 0317   Number of days: 1       Code Status:  Full Code    I spent 50 minutes in the professional and overall care of this patient.        Christiano Mawxell MD

## 2024-09-04 NOTE — CONSULTS
Reason For Consult  Urinary retention    History Of Present Illness  Regulo Marques is a 83 y.o. male presenting with acute CVA  Hx of PCa, s/p brachytherapy, follows with Dr Romeo   He is unable to provide a hx at this time due to current medical condition  Buck placed for PVR >700cc     Past Medical History  He has a past medical history of Diverticulitis of intestine, part unspecified, without perforation or abscess without bleeding, Personal history of malignant neoplasm, unspecified, Personal history of other diseases of the circulatory system, Personal history of other diseases of the digestive system, Personal history of other endocrine, nutritional and metabolic disease, Personal history of other malignant neoplasm of large intestine, and Personal history of other specified conditions.    Surgical History  He has a past surgical history that includes Colonoscopy (10/20/2016); Total hip arthroplasty (10/20/2016); Tonsillectomy (10/20/2016); Other surgical history (11/08/2019); and Other surgical history (11/08/2019).     Social History  He reports that he has quit smoking. His smoking use included cigarettes. He has never used smokeless tobacco. He reports that he does not drink alcohol and does not use drugs.    Family History  No family history on file.     Allergies  Codeine    Review of Systems   Reason unable to perform ROS: Aphagia.         Physical Exam  No distress  Buck to CD with clear yellow urine      Current Facility-Administered Medications:     acetaminophen (Tylenol) suppository 650 mg, 650 mg, rectal, q6h PRN, Paul Casarez MD    aspirin suppository 150 mg, 150 mg, rectal, Daily, Laly Rodriguez DO, 150 mg at 09/03/24 1733    [Held by provider] atorvastatin (Lipitor) tablet 80 mg, 80 mg, oral, Nightly, TENZIN Molina-CNP    cefTRIAXone (Rocephin) 1 g in dextrose (iso) IV 50 mL, 1 g, intravenous, q24h, Sarahi Barnes APRN-CNP, Stopped at 09/03/24 2119    dextrose 50 %  "injection 12.5 g, 12.5 g, intravenous, q15 min PRN, MARCI Molina    glucagon (Glucagen) injection 1 mg, 1 mg, intramuscular, q15 min PRN, MARCI Molina    insulin lispro (HumaLOG) injection 0-10 Units, 0-10 Units, subcutaneous, q4h, MARCI Molina, 2 Units at 09/03/24 0446    metoprolol tartrate (Lopressor) injection 5 mg, 5 mg, intravenous, Once PRN, MARCI Molina    oxygen (O2) therapy, , inhalation, Continuous PRN - O2/gases, MARCI Molina    pantoprazole (ProtoNix) injection 40 mg, 40 mg, intravenous, Daily, MARCI Molina, 40 mg at 09/03/24 0825    sodium chloride 0.9% infusion, 50 mL/hr, intravenous, Continuous, MARCI Anaya, Last Rate: 50 mL/hr at 09/04/24 0604, 50 mL/hr at 09/04/24 0604    tamsulosin (Flomax) 24 hr capsule 0.4 mg, 0.4 mg, oral, Daily, Amalia Sandra PA-C     Last Recorded Vitals  Blood pressure 131/60, pulse 68, temperature 37 °C (98.6 °F), temperature source Temporal, resp. rate 20, height 1.753 m (5' 9\"), weight 93.2 kg (205 lb 7.5 oz), SpO2 100%.    Relevant Results  Results for orders placed or performed during the hospital encounter of 09/02/24 (from the past 96 hour(s))   POCT GLUCOSE   Result Value Ref Range    POCT Glucose 219 (H) 74 - 99 mg/dL   CBC and Auto Differential   Result Value Ref Range    WBC 8.1 4.4 - 11.3 x10*3/uL    nRBC 0.0 0.0 - 0.0 /100 WBCs    RBC 2.44 (L) 4.50 - 5.90 x10*6/uL    Hemoglobin 7.7 (L) 13.5 - 17.5 g/dL    Hematocrit 24.5 (L) 41.0 - 52.0 %     80 - 100 fL    MCH 31.6 26.0 - 34.0 pg    MCHC 31.4 (L) 32.0 - 36.0 g/dL    RDW 16.2 (H) 11.5 - 14.5 %    Platelets 190 150 - 450 x10*3/uL    Neutrophils % 75.0 40.0 - 80.0 %    Immature Granulocytes %, Automated 0.4 0.0 - 0.9 %    Lymphocytes % 15.3 13.0 - 44.0 %    Monocytes % 6.8 2.0 - 10.0 %    Eosinophils % 2.3 0.0 - 6.0 %    Basophils % 0.2 0.0 - 2.0 %    Neutrophils Absolute 6.09 (H) 1.60 - " 5.50 x10*3/uL    Immature Granulocytes Absolute, Automated 0.03 0.00 - 0.50 x10*3/uL    Lymphocytes Absolute 1.24 0.80 - 3.00 x10*3/uL    Monocytes Absolute 0.55 0.05 - 0.80 x10*3/uL    Eosinophils Absolute 0.19 0.00 - 0.40 x10*3/uL    Basophils Absolute 0.02 0.00 - 0.10 x10*3/uL   Comprehensive metabolic panel   Result Value Ref Range    Glucose 209 (H) 74 - 99 mg/dL    Sodium 139 136 - 145 mmol/L    Potassium 4.1 3.5 - 5.3 mmol/L    Chloride 108 (H) 98 - 107 mmol/L    Bicarbonate 18 (L) 21 - 32 mmol/L    Anion Gap 17 10 - 20 mmol/L    Urea Nitrogen 60 (H) 6 - 23 mg/dL    Creatinine 3.07 (H) 0.50 - 1.30 mg/dL    eGFR 19 (L) >60 mL/min/1.73m*2    Calcium 8.8 8.6 - 10.3 mg/dL    Albumin 3.4 3.4 - 5.0 g/dL    Alkaline Phosphatase 104 33 - 136 U/L    Total Protein 5.9 (L) 6.4 - 8.2 g/dL    AST 14 9 - 39 U/L    Bilirubin, Total 0.3 0.0 - 1.2 mg/dL    ALT 18 10 - 52 U/L   Troponin I, High Sensitivity   Result Value Ref Range    Troponin I, High Sensitivity 143 (HH) 0 - 20 ng/L   Protime-INR   Result Value Ref Range    Protime 12.3 9.8 - 12.8 seconds    INR 1.1 0.9 - 1.1   APTT   Result Value Ref Range    aPTT 29 27 - 38 seconds   Lipid Panel   Result Value Ref Range    Cholesterol 89 0 - 199 mg/dL    HDL-Cholesterol 45.5 mg/dL    Cholesterol/HDL Ratio 2.0     LDL Calculated 33 <=99 mg/dL    VLDL 11 0 - 40 mg/dL    Triglycerides 54 0 - 149 mg/dL    Non HDL Cholesterol 44 0 - 149 mg/dL   Lavender Top   Result Value Ref Range    Extra Tube Hold for add-ons.    PST Top   Result Value Ref Range    Extra Tube Hold for add-ons.    Hemoglobin A1C   Result Value Ref Range    Hemoglobin A1C 5.7 (H) see below %    Estimated Average Glucose 117 Not Established mg/dL   B-Type Natriuretic Peptide   Result Value Ref Range     (H) 0 - 99 pg/mL   ECG 12 lead   Result Value Ref Range    Ventricular Rate 69 BPM    Atrial Rate 0 BPM    IL Interval 153 ms    QRS Duration 116 ms    QT Interval 442 ms    QTC Calculation(Bazett) 474 ms     R Axis -85 degrees    T Axis 98 degrees    QRS Count 11 beats    Q Onset 249 ms    T Offset 470 ms    QTC Fredericia 463 ms   Troponin I, High Sensitivity   Result Value Ref Range    Troponin I, High Sensitivity 144 (HH) 0 - 20 ng/L   POCT GLUCOSE   Result Value Ref Range    POCT Glucose 154 (H) 74 - 99 mg/dL   POCT GLUCOSE   Result Value Ref Range    POCT Glucose 152 (H) 74 - 99 mg/dL   POCT GLUCOSE   Result Value Ref Range    POCT Glucose 125 (H) 74 - 99 mg/dL   Lavender Top   Result Value Ref Range    Extra Tube Hold for add-ons.    Troponin I, High Sensitivity   Result Value Ref Range    Troponin I, High Sensitivity 139 (HH) 0 - 20 ng/L   Transthoracic Echo (TTE) Complete   Result Value Ref Range    AV pk tami 1.39 m/s    AV mn grad 5.0 mmHg    LVOT diam 1.80 cm    LA vol index A/L 45.9 ml/m2    Tricuspid annular plane systolic excursion 1.4 cm    LV EF 68 %    RV free wall pk S' 7.18 cm/s    LVIDd 4.30 cm    RVSP 36.7 mmHg    Aortic Valve Area by Continuity of VTI 1.59 cm2    Aortic Valve Area by Continuity of Peak Velocity 1.55 cm2    AV pk grad 7.7 mmHg    LV A4C EF 43.6    POCT GLUCOSE   Result Value Ref Range    POCT Glucose 148 (H) 74 - 99 mg/dL   Basic Metabolic Panel   Result Value Ref Range    Glucose 125 (H) 74 - 99 mg/dL    Sodium 140 136 - 145 mmol/L    Potassium 4.2 3.5 - 5.3 mmol/L    Chloride 111 (H) 98 - 107 mmol/L    Bicarbonate 20 (L) 21 - 32 mmol/L    Anion Gap 13 10 - 20 mmol/L    Urea Nitrogen 57 (H) 6 - 23 mg/dL    Creatinine 2.67 (H) 0.50 - 1.30 mg/dL    eGFR 23 (L) >60 mL/min/1.73m*2    Calcium 9.0 8.6 - 10.3 mg/dL   CBC   Result Value Ref Range    WBC 8.4 4.4 - 11.3 x10*3/uL    nRBC 0.0 0.0 - 0.0 /100 WBCs    RBC 2.49 (L) 4.50 - 5.90 x10*6/uL    Hemoglobin 7.9 (L) 13.5 - 17.5 g/dL    Hematocrit 24.8 (L) 41.0 - 52.0 %     80 - 100 fL    MCH 31.7 26.0 - 34.0 pg    MCHC 31.9 (L) 32.0 - 36.0 g/dL    RDW 16.1 (H) 11.5 - 14.5 %    Platelets 193 150 - 450 x10*3/uL   POCT GLUCOSE    Result Value Ref Range    POCT Glucose 126 (H) 74 - 99 mg/dL   Urinalysis with Reflex Microscopic   Result Value Ref Range    Color, Urine Colorless (N) Light-Yellow, Yellow, Dark-Yellow    Appearance, Urine Turbid (N) Clear    Specific Gravity, Urine 1.022 1.005 - 1.035    pH, Urine 5.5 5.0, 5.5, 6.0, 6.5, 7.0, 7.5, 8.0    Protein, Urine 30 (1+) (A) NEGATIVE, 10 (TRACE), 20 (TRACE) mg/dL    Glucose, Urine Normal Normal mg/dL    Blood, Urine OVER (3+) (A) NEGATIVE    Ketones, Urine NEGATIVE NEGATIVE mg/dL    Bilirubin, Urine NEGATIVE NEGATIVE    Urobilinogen, Urine Normal Normal mg/dL    Nitrite, Urine NEGATIVE NEGATIVE    Leukocyte Esterase, Urine 500 Sona/µL (A) NEGATIVE   Microscopic Only, Urine   Result Value Ref Range    WBC, Urine >50 (A) 1-5, NONE /HPF    WBC Clumps, Urine FEW Reference range not established. /HPF    RBC, Urine >20 (A) NONE, 1-2, 3-5 /HPF    Bacteria, Urine 1+ (A) NONE SEEN /HPF    Mucus, Urine FEW Reference range not established. /LPF    Amorphous Crystals, Urine 1+ NONE, 1+, 2+ /HPF   POCT GLUCOSE   Result Value Ref Range    POCT Glucose 124 (H) 74 - 99 mg/dL   POCT GLUCOSE   Result Value Ref Range    POCT Glucose 122 (H) 74 - 99 mg/dL   POCT GLUCOSE   Result Value Ref Range    POCT Glucose 120 (H) 74 - 99 mg/dL   POCT GLUCOSE   Result Value Ref Range    POCT Glucose 115 (H) 74 - 99 mg/dL   Basic Metabolic Panel   Result Value Ref Range    Glucose 115 (H) 74 - 99 mg/dL    Sodium 142 136 - 145 mmol/L    Potassium 4.2 3.5 - 5.3 mmol/L    Chloride 115 (H) 98 - 107 mmol/L    Bicarbonate 20 (L) 21 - 32 mmol/L    Anion Gap 11 10 - 20 mmol/L    Urea Nitrogen 50 (H) 6 - 23 mg/dL    Creatinine 2.36 (H) 0.50 - 1.30 mg/dL    eGFR 26 (L) >60 mL/min/1.73m*2    Calcium 8.9 8.6 - 10.3 mg/dL   CBC and Auto Differential   Result Value Ref Range    WBC 8.2 4.4 - 11.3 x10*3/uL    nRBC 0.2 (H) 0.0 - 0.0 /100 WBCs    RBC 2.39 (L) 4.50 - 5.90 x10*6/uL    Hemoglobin 7.5 (L) 13.5 - 17.5 g/dL    Hematocrit 24.1  (L) 41.0 - 52.0 %     (H) 80 - 100 fL    MCH 31.4 26.0 - 34.0 pg    MCHC 31.1 (L) 32.0 - 36.0 g/dL    RDW 16.1 (H) 11.5 - 14.5 %    Platelets 197 150 - 450 x10*3/uL    Neutrophils % 83.8 40.0 - 80.0 %    Immature Granulocytes %, Automated 0.4 0.0 - 0.9 %    Lymphocytes % 8.3 13.0 - 44.0 %    Monocytes % 5.6 2.0 - 10.0 %    Eosinophils % 1.7 0.0 - 6.0 %    Basophils % 0.2 0.0 - 2.0 %    Neutrophils Absolute 6.84 (H) 1.60 - 5.50 x10*3/uL    Immature Granulocytes Absolute, Automated 0.03 0.00 - 0.50 x10*3/uL    Lymphocytes Absolute 0.68 (L) 0.80 - 3.00 x10*3/uL    Monocytes Absolute 0.46 0.05 - 0.80 x10*3/uL    Eosinophils Absolute 0.14 0.00 - 0.40 x10*3/uL    Basophils Absolute 0.02 0.00 - 0.10 x10*3/uL      ECG 12 lead    Result Date: 9/3/2024  Atrial fibrillation Nonspecific IVCD with LAD Inferior infarct, old Consider anterior infarct Lateral leads are also involved See ED provider note for full interpretation and clinical correlation Confirmed by Flakita Tineo ([id number]) on 9/3/2024 5:25:43 PM    Transthoracic Echo (TTE) Complete    Result Date: 9/3/2024   [facility], [address] [place] [address]           Tel [phone] and Fax [phone] TRANSTHORACIC ECHOCARDIOGRAM REPORT  Patient Name:      KEN White Physician:    [id number] Gomez Flores MD Study Date:        9/3/2024             Ordering Provider:    [id number] DARIN CALVILLO MRN/PID:           [id number]             Fellow: Accession#:        [id number]         Nurse:                Sofi Muñoz RN Date of Birth/Age: 1940 / 83 years Sonographer:          Stephanie Colin RDCS Gender:            M                    Additional Staff: Height:            175.00 cm             Admit Date:           9/2/2024 Weight:            93.00 kg             Admission Status:     Inpatient -                                                               Routine BSA / BMI:         2.09 m2 / 30.37      Encounter#:           [id number]                    kg/m2 Blood Pressure:    106/54 mmHg          Department Location:  [place] [facility]                                                               [facility] Study Type:    TRANSTHORACIC ECHO (TTE) COMPLETE Diagnosis/ICD: Encounter for other specified special examinations-Z01.89 Indication:    Acute stroke due to embolism of right middle cerebral artery (;; CPT Code:      Echo Complete w Full [id number] Patient History: Pertinent History: Onset of left sided weakness, expressive aphasia, dysarthria,                    mental status change, and slurred speech. Study Detail: The following Echo studies were performed: 2D, M-Mode, Doppler,               color flow and 3D. Technically challenging study due to body               habitus and the patient's lack of cooperation. Agitated saline               used as a contrast agent for intraseptal flow evaluation.  PHYSICIAN INTERPRETATION: Left Ventricle: The left ventricular systolic function is normal, with a visually estimated ejection fraction of 65-70%. There are no regional left ventricular wall motion abnormalities. The left ventricular cavity size is normal. There is severe concentric left ventricular hypertrophy. Left ventricular diastolic filling was indeterminate. Left Atrium: The left atrium is moderately dilated. A bubble study using agitated saline was performed. Bubble study is negative. Right Ventricle: The right ventricle is normal in size. There is normal right ventricular global systolic function. Right Atrium: The right atrium is mildly dilated. Aortic Valve: The aortic valve appears structurally normal. The aortic valve dimensionless index is 0.62. There is no evidence of aortic  valve regurgitation. The peak instantaneous gradient of the aortic valve is 7.7 mmHg. The mean gradient of the aortic valve is 5.0 mmHg. Mitral Valve: The mitral valve is mildly thickened. The mitral valve spectral Doppler A-wave is absent, consistent with atrial fibrillation. There is trace mitral valve regurgitation. Tricuspid Valve: The tricuspid valve is structurally normal. There is trace tricuspid regurgitation. The Doppler estimated RVSP is mildly elevated at 36.7 mmHg. Pulmonic Valve: The pulmonic valve is structurally normal. There is no indication of pulmonic valve regurgitation. Pericardium: There is a trivial to small pericardial effusion. Pleural: There is a moderate left pleural effusion. Aorta: The aortic root is normal. There is mild dilatation of the ascending aorta. There is mild dilatation of the aortic root. Systemic Veins: The inferior vena cava appears dilated. There is IVC inspiratory collapse greater than 50%. In comparison to the previous echocardiogram(s): There are no prior studies on this patient for comparison purposes.  CONCLUSIONS:  1. The left ventricular systolic function is normal, with a visually estimated ejection fraction of 65-70%.  2. Left ventricular diastolic filling was indeterminate.  3. There is severe concentric left ventricular hypertrophy.  4. There is normal right ventricular global systolic function.  5. The left atrium is moderately dilated.  6. There is a moderate pleural effusion.  7. Absent A-wave on MV spectral Doppler tracing, consistent with atrial fibrillation.  8. Bubble study is negative.  9. Mildly elevated RVSP. RECOMMENDATIONS: Based on the results of this study, it is recommended that the patient is considered for a cardiac MRI.  QUANTITATIVE DATA SUMMARY: 2D MEASUREMENTS:                         Normal Ranges: LAs:           5.50 cm  (2.7-4.0cm) IVSd:          1.90 cm  (0.6-1.1cm) LVPWd:         1.70 cm  (0.6-1.1cm) LVIDd:         4.30 cm  (3.9-5.9cm)  LVIDs:         2.60 cm LV Mass Index: 165 g/m2 LVEDV Index:   35 ml/m2 LV % FS        39.5 % LA VOLUME:                               Normal Ranges: LA Vol A4C:        111.0 ml   (22+/-6mL/m2) LA Vol A2C:        78.0 ml LA Vol BP:         95.7 ml LA Vol Index A4C:  53.2ml/m2 LA Vol Index A2C:  37.4 ml/m2 LA Vol Index BP:   45.9 ml/m2 LA Area A4C:       29.8 cm2 LA Area A2C:       25.7 cm2 LA Major Axis A4C: 6.8 cm LA Major Axis A2C: 7.2 cm LA Volume Index:   45.9 ml/m2 RA VOLUME BY A/L METHOD:                               Normal Ranges: RA Vol A4C:        76.0 ml    (8.3-19.5ml) RA Vol Index A4C:  36.4 ml/m2 RA Area A4C:       25.4 cm2 RA Major Axis A4C: 7.2 cm M-MODE MEASUREMENTS:                  Normal Ranges: Ao Root: 3.20 cm (2.0-3.7cm) LAs:     5.20 cm (2.7-4.0cm) AORTA MEASUREMENTS:                      Normal Ranges: Ao Sinus, d: 4.00 cm (2.1-3.5cm) Ao STJ, d:   3.60 cm (1.7-3.4cm) Asc Ao, d:   3.90 cm (2.1-3.4cm) LV SYSTOLIC FUNCTION BY 2D PLANIMETRY (MOD):                      Normal Ranges: EF-A4C View:    44 % (>=55%) EF-A2C View:    57 % EF-Biplane:     45 % EF-Visual:      68 % LV EF Reported: 68 % LV DIASTOLIC FUNCTION:                             Normal Ranges: PulmV Sys Himanshu:  63.50 cm/s PulmV Farfan Himanshu: 104.00 cm/s PulmV S/D Himanshu:  0.60 AORTIC VALVE:                                   Normal Ranges: AoV Vmax:                1.39 m/s (<=1.7m/s) AoV Peak P.7 mmHg (<20mmHg) AoV Mean P.0 mmHg (1.7-11.5mmHg) LVOT Max Himanshu:            0.85 m/s (<=1.1m/s) AoV VTI:                 29.50 cm (18-25cm) LVOT VTI:                18.40 cm LVOT Diameter:           1.80 cm  (1.8-2.4cm) AoV Area, VTI:           1.59 cm2 (2.5-5.5cm2) AoV Area,Vmax:           1.55 cm2 (2.5-4.5cm2) AoV Dimensionless Index: 0.62  RIGHT VENTRICLE: RV Basal 4.00 cm RV Mid   3.10 cm RV Major 8.2 cm TAPSE:   14.2 mm RV s'    0.07 m/s TRICUSPID VALVE/RVSP:                             Normal Ranges: Peak TR  Velocity: 2.68 m/s Est. RA Pressure: 8 mmHg RV Syst Pressure: 36.7 mmHg (< 30mmHg) IVC Diam:         2.58 cm PULMONIC VALVE:                      Normal Ranges: PV Max Himanshu: 1.1 m/s  (0.6-0.9m/s) PV Max P.5 mmHg PV Mean PG: 3.0 mmHg PV VTI:     20.80 cm Pulmonary Veins: PulmV Farfan Himanshu: 104.00 cm/s PulmV S/D Himanshu:  0.60 PulmV Sys Himanshu:  63.50 cm/s  [id number] Gomez Flores MD Electronically signed on 9/3/2024 at 2:51:57 PM  ** Final **     MR brain wo IV contrast    Result Date: 9/3/2024  Interpreted By:  Kirby Root and Liller Gregory STUDY: MR BRAIN WO IV CONTRAST; 9/3/2024 11:33 am   INDICATION: Signs/Symptoms:stroke.   COMPARISON: CT brain attack 2024 at 8:22 p.m. and 8:11 p.m.   ACCESSION NUMBER(S): [id number]   ORDERING CLINICIAN: DARIN CALVILLO   TECHNIQUE: Multiplanar, multisequential MR imaging of the brain was performed without contrast material.   FINDINGS: Parenchyma: Diffusion restriction with associated T2 and FLAIR hyperintense signal noted within the right parietal lobe extending into the superior aspect of the right temporal lobe as well as along the posterior margin of the right frontal lobe precentral gyrus consistent with patient's known history of right MCA infarction.   There is mild associated locoregional gyral swelling and sulcal effacement. No hemorrhage is noted. No significant midline shift or herniation.   CSF Spaces: The ventricles, sulci and basal cisterns are within normal limits. No abnormal extra-axial fluid collection.   Paranasal Sinuses and Mastoids: Trace fluid noted within the right mastoid air cells. Mild mucosal thickening of the ethmoid air cells. The remainder of the paranasal sinuses are clear.   Orbits: The visualized orbits are unremarkable in appearance.   Vasculature: Increased T2 signal noted within the proximal right M2 branch (series 7 image 17 and 18) which is better evaluated on CTA of the head from 2024 consistent with patient's known right M2  infarction. The remainder of the visualized flow voids display expected low signal. There is dominance of the left vertebral artery as compared to the right.       Findings consistent with known, acute right MCA infarction involving the right parietal and temporal lobes as described without evidence of significant mass effect, herniation, or hemorrhagic transformation.   I personally reviewed the images/study and I agree with the findings as stated above by resident physician, Dr. Martín Argueta.   MACRO: None   Signed by: Kirby Root 9/3/2024 12:05 PM Dictation workstation:   [id number]    XR chest 1 view    Result Date: 9/2/2024  STUDY: Chest Radiograph;  09/02/2024 at 10:08 PM INDICATION: Stroke. COMPARISON: XR chest and abdomen 04/15/21. XR chest 10/09/20. ACCESSION NUMBER(S): [id number] ORDERING CLINICIAN: DARIN CALVILLO TECHNIQUE:  Frontal chest was obtained at 2208 hours. FINDINGS: CARDIOMEDIASTINAL SILHOUETTE: Cardiomediastinal silhouette is normal in size and configuration.  LUNGS: Lungs are clear.  ABDOMEN: No remarkable upper abdominal findings.  BONES: No acute osseous changes.    No acute pulmonary pathology. Signed by Aram De Jesus MD    CT brain attack head wo IV contrast    Result Date: 9/2/2024  Interpreted By:  Gomez Rosenthal, STUDY: CT BRAIN ATTACK HEAD WO IV CONTRAST; CT BRAIN ATTACK ANGIO HEAD AND NECK W AND WO IV CONTRAST;  9/2/2024 8:11 pm; 9/2/2024 8:22 pm   INDICATION: Signs/Symptoms:Stroke Evaluation; Signs/Symptoms:left sided weakness   COMPARISON: None.   ACCESSION NUMBER(S): [id number]; [id number]   ORDERING CLINICIAN: SHANKAR NESS   TECHNIQUE: Multiple contiguous axial noncontrast images of the head were obtained. Following IV contrast administration of iodinated contrast, a CT angiography of the head and neck was performed. MIPS and 3D reconstructions of the [place] of [place] and neck were created on an independent workstation and reviewed.   FINDINGS: NON-CONTRAST  HEAD CT:   BRAIN PARENCHYMA:  No evidence of acute intraparenchymal hemorrhage. No mass-effect, midline shift or effacement of cerebral sulci. There is suggestion for slight loss of gray-white differentiation involving the right insula.   VENTRICLES and EXTRA-AXIAL SPACES: Along the right frontal lobe there is a 0.5 cm low-density subdural fluid collection (coronal image 31, 02/02/2004; sagittal image 40, series 203))measuring no greater than 0.5 cm concerning for subacute or chronic subdural hematoma. Otherwise no acute extra-axial hemorrhage. Ventricles and sulci are age-concordant.   PARANASAL SINUSES/MASTOIDS:  No hemorrhage or air-fluid levels within the visualized paranasal sinuses. The mastoids are well aerated.   CALVARIUM/ORBITS:  No skull fracture.  The orbits and globes are intact to the extent visualized.   EXTRACRANIAL SOFT TISSUES: No discernible abnormality.       CTA NECK:   Partially visualized left pleural effusion. There is pericardial fluid in the superior aortic recess.   LEFT VERTEBRAL ARTERY: Mild atherosclerosis of the origin. No hemodynamically significant stenosis, occlusion, or dissection.   LEFT COMMON/INTERNAL CAROTID ARTERY: Minimal atherosclerosis at the bulb. No hemodynamically significant stenosis, occlusion, or dissection.   RIGHT VERTEBRAL ARTERY: Mild atherosclerosis of the origin. No hemodynamically significant stenosis, occlusion, or dissection.   RIGHT COMMON/INTERNAL CAROTID ARTERY: Mild atherosclerosis at the bulb. No hemodynamically significant stenosis, occlusion, or dissection.     The neck soft tissues show no evidence of mass, fluid collection, or enlarged lymph nodes.   There is no acute osseous abnormality.         CTA HEAD:   ANTERIOR CIRCULATION: No aneurysm.   - Internal Carotid Arteries: Minimal calcific atherosclerosis bilaterally. No hemodynamically significant stenosis or occlusion.   - Middle Cerebral Arteries: There is an acute complete occlusion involving the  right M2 inferior division branch. It transiently reconstitutes but then again occludes at the M3 branch point and there is a paucity of M4 cortical opacification involving its territory throughout the right parietal and posterior temporal regions.   - Anterior Cerebral Arteries:  No hemodynamically significant stenosis or occlusion.     POSTERIOR CIRCULATION: No aneurysm.   - Intracranial Vertebral Arteries: Mild calcific atherosclerosis on the left. No hemodynamically significant stenosis or occlusion.   - Basilar Artery:  No hemodynamically significant stenosis or occlusion.   - Posterior Cerebral Arteries:  No hemodynamically significant stenosis or occlusion.   No arteriovenous malformation is visualized. No pathologic intracranial enhancement or discrete mass. The dural venous sinuses are patent.   MIPS and 3D reconstructions confirm the above findings.       Acute complete occlusion involving the right M2 inferior division branch. It transiently reconstitutes but then again occludes at the M3 branch point and there is a slight paucity of M4 cortical opacification involving its territory throughout the right parietal and posterior temporal regions.   Otherwise, no hemodynamically significant intracranial or extracranial stenosis, occlusion, or aneurysm.   Suspect a early loss in the gray-white differentiation of the insula.   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   Moderate-sized left pleural effusion.   MACRO: Gomez Rosenthal discussed the significance and urgency of this critical finding via U[employer] HAIKU with  SHANKAR NESS on 9/2/2024 at 8:23 pm.  (**-RCF-**) Findings:  See findings.   Signed by: Gomez Rosenthal 9/2/2024 8:37 PM Dictation workstation:   [id number]    CT brain attack angio head and neck W and WO IV contrast    Result  Date: 9/2/2024  Interpreted By:  Gomez Rosenthal, STUDY: CT BRAIN ATTACK HEAD WO IV CONTRAST; CT BRAIN ATTACK ANGIO HEAD AND NECK W AND WO IV CONTRAST;  9/2/2024 8:11 pm; 9/2/2024 8:22 pm   INDICATION: Signs/Symptoms:Stroke Evaluation; Signs/Symptoms:left sided weakness   COMPARISON: None.   ACCESSION NUMBER(S): [id number]; [id number]   ORDERING CLINICIAN: SHANKAR NESS   TECHNIQUE: Multiple contiguous axial noncontrast images of the head were obtained. Following IV contrast administration of iodinated contrast, a CT angiography of the head and neck was performed. MIPS and 3D reconstructions of the [place] and neck were created on an independent workstation and reviewed.   FINDINGS: NON-CONTRAST HEAD CT:   BRAIN PARENCHYMA:  No evidence of acute intraparenchymal hemorrhage. No mass-effect, midline shift or effacement of cerebral sulci. There is suggestion for slight loss of gray-white differentiation involving the right insula.   VENTRICLES and EXTRA-AXIAL SPACES: Along the right frontal lobe there is a 0.5 cm low-density subdural fluid collection (coronal image 31, 02/02/2004; sagittal image 40, series 203))measuring no greater than 0.5 cm concerning for subacute or chronic subdural hematoma. Otherwise no acute extra-axial hemorrhage. Ventricles and sulci are age-concordant.   PARANASAL SINUSES/MASTOIDS:  No hemorrhage or air-fluid levels within the visualized paranasal sinuses. The mastoids are well aerated.   CALVARIUM/ORBITS:  No skull fracture.  The orbits and globes are intact to the extent visualized.   EXTRACRANIAL SOFT TISSUES: No discernible abnormality.       CTA NECK:   Partially visualized left pleural effusion. There is pericardial fluid in the superior aortic recess.   LEFT VERTEBRAL ARTERY: Mild atherosclerosis of the origin. No hemodynamically significant stenosis, occlusion, or dissection.   LEFT COMMON/INTERNAL CAROTID ARTERY: Minimal atherosclerosis at the bulb. No hemodynamically  significant stenosis, occlusion, or dissection.   RIGHT VERTEBRAL ARTERY: Mild atherosclerosis of the origin. No hemodynamically significant stenosis, occlusion, or dissection.   RIGHT COMMON/INTERNAL CAROTID ARTERY: Mild atherosclerosis at the bulb. No hemodynamically significant stenosis, occlusion, or dissection.     The neck soft tissues show no evidence of mass, fluid collection, or enlarged lymph nodes.   There is no acute osseous abnormality.         CTA HEAD:   ANTERIOR CIRCULATION: No aneurysm.   - Internal Carotid Arteries: Minimal calcific atherosclerosis bilaterally. No hemodynamically significant stenosis or occlusion.   - Middle Cerebral Arteries: There is an acute complete occlusion involving the right M2 inferior division branch. It transiently reconstitutes but then again occludes at the M3 branch point and there is a paucity of M4 cortical opacification involving its territory throughout the right parietal and posterior temporal regions.   - Anterior Cerebral Arteries:  No hemodynamically significant stenosis or occlusion.     POSTERIOR CIRCULATION: No aneurysm.   - Intracranial Vertebral Arteries: Mild calcific atherosclerosis on the left. No hemodynamically significant stenosis or occlusion.   - Basilar Artery:  No hemodynamically significant stenosis or occlusion.   - Posterior Cerebral Arteries:  No hemodynamically significant stenosis or occlusion.   No arteriovenous malformation is visualized. No pathologic intracranial enhancement or discrete mass. The dural venous sinuses are patent.   MIPS and 3D reconstructions confirm the above findings.       Acute complete occlusion involving the right M2 inferior division branch. It transiently reconstitutes but then again occludes at the M3 branch point and there is a slight paucity of M4 cortical opacification involving its territory throughout the right parietal and posterior temporal regions.   Otherwise, no hemodynamically significant  intracranial or extracranial stenosis, occlusion, or aneurysm.   Suspect a early loss in the gray-white differentiation of the insula.   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   Moderate-sized left pleural effusion.   MACRO: Gomez Rosenthal discussed the significance and urgency of this critical finding via [employer] HAIKU with  SHANKAR NESS on 9/2/2024 at 8:23 pm.  (**-RCF-**) Findings:  See findings.   Signed by: Gomez Rosenthal 9/2/2024 8:37 PM Dictation workstation:   [id number]        Assessment/Plan   Urinary retention/Hx of PCa  -Maintain Buck   -Continue flomax when able  -TOV in 2-4 weeks once recovered from current medical conditions  -Outpatient management of PCa    Thank you for allowing us to participate in this patient's care, we will sign off.    Padilla Morataya PA-C

## 2024-09-04 NOTE — PROGRESS NOTES
Regulo Rodriguez is a 83 y.o. 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      Subjective   Patient seen and examined this morning.  No acute events overnight.    Objective     Last Recorded Vitals  /60 (BP Location: Right arm, Patient Position: Lying)   Pulse 68   Temp 37 °C (98.6 °F) (Temporal)   Resp 20   Wt 93.2 kg (205 lb 7.5 oz)   SpO2 100%   Intake/Output last 3 Shifts:    Intake/Output Summary (Last 24 hours) at 9/4/2024 0858  Last data filed at 9/4/2024 0604  Gross per 24 hour   Intake 842.5 ml   Output 1075 ml   Net -232.5 ml       Admission Weight  Weight: 86 kg (189 lb 9.5 oz) (09/02/24 2006)    Daily Weight  09/03/24 : 93.2 kg (205 lb 7.5 oz)    Image Results  ECG 12 lead  Atrial fibrillation  Nonspecific IVCD with LAD  Inferior infarct, old  Consider anterior infarct  Lateral leads are also involved    See ED provider note for full interpretation and clinical correlation  Confirmed by Flakita Tineo ([id number]) on 9/3/2024 5:25:43 PM  Transthoracic Echo (TTE) [facility], [address] [place] [address]            Tel [phone] and Fax [phone]    TRANSTHORACIC ECHOCARDIOGRAM REPORT       Patient Name:      REGULO RODRIGUEZ       Reading Physician:    [id number] Gomez Flores MD  Study Date:        9/3/2024             Ordering Provider:    [id number] DARIN CALVILLO  MRN/PID:           [id number]             Fellow:  Accession#:        [id number]         Nurse:                Sofi Muñoz RN  Date of Birth/Age: 1940 / 83 years Sonographer:          Stephanie Colin RDCS  Gender:            M                    Additional Staff:  Height:             175.00 cm            Admit Date:           9/2/2024  Weight:            93.00 kg             Admission Status:     Inpatient -                                                                Routine  BSA / BMI:         2.09 m2 / 30.37      Encounter#:           [id number]                     kg/m2  Blood Pressure:    106/54 mmHg          Department Location:  [address]                                                                [facility]    Study Type:    TRANSTHORACIC ECHO (TTE) COMPLETE  Diagnosis/ICD: Encounter for other specified special examinations-Z01.89  Indication:    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    Study Detail: The following Echo studies were performed: 2D, M-Mode, Doppler,                color flow and 3D. Technically challenging study due to body                habitus and the patient's lack of cooperation. Agitated saline                used as a contrast agent for intraseptal flow evaluation.       PHYSICIAN INTERPRETATION:  Left Ventricle: The left ventricular systolic function is normal, with a visually estimated ejection fraction of 65-70%. There are no regional left ventricular wall motion abnormalities. The left ventricular cavity size is normal. There is severe concentric left ventricular hypertrophy. Left ventricular diastolic filling was indeterminate.  Left Atrium: The left atrium is moderately dilated. A bubble study using agitated saline was performed. Bubble study is negative.  Right Ventricle: The right ventricle is normal in size. There is normal right ventricular global systolic function.  Right Atrium: The right atrium is mildly dilated.  Aortic Valve: The aortic valve appears structurally normal. The aortic valve dimensionless  index is 0.62. There is no evidence of aortic valve regurgitation. The peak instantaneous gradient of the aortic valve is 7.7 mmHg. The mean gradient of the aortic valve is 5.0 mmHg.  Mitral Valve: The mitral valve is mildly thickened. The mitral valve spectral Doppler A-wave is absent, consistent with atrial fibrillation. There is trace mitral valve regurgitation.  Tricuspid Valve: The tricuspid valve is structurally normal. There is trace tricuspid regurgitation. The Doppler estimated RVSP is mildly elevated at 36.7 mmHg.  Pulmonic Valve: The pulmonic valve is structurally normal. There is no indication of pulmonic valve regurgitation.  Pericardium: There is a trivial to small pericardial effusion.  Pleural: There is a moderate left pleural effusion.  Aorta: The aortic root is normal. There is mild dilatation of the ascending aorta. There is mild dilatation of the aortic root.  Systemic Veins: The inferior vena cava appears dilated. There is IVC inspiratory collapse greater than 50%.  In comparison to the previous echocardiogram(s): There are no prior studies on this patient for comparison purposes.       CONCLUSIONS:   1. The left ventricular systolic function is normal, with a visually estimated ejection fraction of 65-70%.   2. Left ventricular diastolic filling was indeterminate.   3. There is severe concentric left ventricular hypertrophy.   4. There is normal right ventricular global systolic function.   5. The left atrium is moderately dilated.   6. There is a moderate pleural effusion.   7. Absent A-wave on MV spectral Doppler tracing, consistent with atrial fibrillation.   8. Bubble study is negative.   9. Mildly elevated RVSP.    RECOMMENDATIONS:  Based on the results of this study, it is recommended that the patient is considered for a cardiac MRI.     QUANTITATIVE DATA SUMMARY:  2D MEASUREMENTS:                          Normal Ranges:  LAs:           5.50 cm  (2.7-4.0cm)  IVSd:          1.90 cm   (0.6-1.1cm)  LVPWd:         1.70 cm  (0.6-1.1cm)  LVIDd:         4.30 cm  (3.9-5.9cm)  LVIDs:         2.60 cm  LV Mass Index: 165 g/m2  LVEDV Index:   35 ml/m2  LV % FS        39.5 %    LA VOLUME:                                Normal Ranges:  LA Vol A4C:        111.0 ml   (22+/-6mL/m2)  LA Vol A2C:        78.0 ml  LA Vol BP:         95.7 ml  LA Vol Index A4C:  53.2ml/m2  LA Vol Index A2C:  37.4 ml/m2  LA Vol Index BP:   45.9 ml/m2  LA Area A4C:       29.8 cm2  LA Area A2C:       25.7 cm2  LA Major Axis A4C: 6.8 cm  LA Major Axis A2C: 7.2 cm  LA Volume Index:   45.9 ml/m2    RA VOLUME BY A/L METHOD:                                Normal Ranges:  RA Vol A4C:        76.0 ml    (8.3-19.5ml)  RA Vol Index A4C:  36.4 ml/m2  RA Area A4C:       25.4 cm2  RA Major Axis A4C: 7.2 cm    M-MODE MEASUREMENTS:                   Normal Ranges:  Ao Root: 3.20 cm (2.0-3.7cm)  LAs:     5.20 cm (2.7-4.0cm)    AORTA MEASUREMENTS:                       Normal Ranges:  Ao Sinus, d: 4.00 cm (2.1-3.5cm)  Ao STJ, d:   3.60 cm (1.7-3.4cm)  Asc Ao, d:   3.90 cm (2.1-3.4cm)    LV SYSTOLIC FUNCTION BY 2D PLANIMETRY (MOD):                       Normal Ranges:  EF-A4C View:    44 % (>=55%)  EF-A2C View:    57 %  EF-Biplane:     45 %  EF-Visual:      68 %  LV EF Reported: 68 %    LV DIASTOLIC FUNCTION:                              Normal Ranges:  PulmV Sys Himanshu:  63.50 cm/s  PulmV Farfan Himanshu: 104.00 cm/s  PulmV S/D Himanshu:  0.60    AORTIC VALVE:                                    Normal Ranges:  AoV Vmax:                1.39 m/s (<=1.7m/s)  AoV Peak P.7 mmHg (<20mmHg)  AoV Mean P.0 mmHg (1.7-11.5mmHg)  LVOT Max Himanshu:            0.85 m/s (<=1.1m/s)  AoV VTI:                 29.50 cm (18-25cm)  LVOT VTI:                18.40 cm  LVOT Diameter:           1.80 cm  (1.8-2.4cm)  AoV Area, VTI:           1.59 cm2 (2.5-5.5cm2)  AoV Area,Vmax:           1.55 cm2 (2.5-4.5cm2)  AoV Dimensionless Index: 0.62       RIGHT VENTRICLE:  RV  Basal 4.00 cm  RV Mid   3.10 cm  RV Major 8.2 cm  TAPSE:   14.2 mm  RV s'    0.07 m/s    TRICUSPID VALVE/RVSP:                              Normal Ranges:  Peak TR Velocity: 2.68 m/s  Est. RA Pressure: 8 mmHg  RV Syst Pressure: 36.7 mmHg (< 30mmHg)  IVC Diam:         2.58 cm    PULMONIC VALVE:                       Normal Ranges:  PV Max Himanshu: 1.1 m/s  (0.6-0.9m/s)  PV Max P.5 mmHg  PV Mean PG: 3.0 mmHg  PV VTI:     20.80 cm    Pulmonary Veins:  PulmV Farfan Himanshu: 104.00 cm/s  PulmV S/D Himanshu:  0.60  PulmV Sys Himanshu:  63.50 cm/s       [id number] Gomez Flores MD  Electronically signed on 9/3/2024 at 2:51:57 PM       ** Final **  MR brain wo IV contrast  Narrative: Interpreted By:  Kirby Root and Liller Gregory   STUDY:  MR BRAIN WO IV CONTRAST; 9/3/2024 11:33 am      INDICATION:  Signs/Symptoms:stroke.      COMPARISON:  CT brain attack 2024 at 8:22 p.m. and 8:11 p.m.      ACCESSION NUMBER(S):  [id number]      ORDERING CLINICIAN:  DARIN CALVILLO      TECHNIQUE:  Multiplanar, multisequential MR imaging of the brain was performed  without contrast material.      FINDINGS:  Parenchyma: Diffusion restriction with associated T2 and FLAIR  hyperintense signal noted within the right parietal lobe extending  into the superior aspect of the right temporal lobe as well as along  the posterior margin of the right frontal lobe precentral gyrus  consistent with patient's known history of right MCA infarction.      There is mild associated locoregional gyral swelling and sulcal  effacement. No hemorrhage is noted. No significant midline shift or  herniation.      CSF Spaces: The ventricles, sulci and basal cisterns are within  normal limits. No abnormal extra-axial fluid collection.      Paranasal Sinuses and Mastoids: Trace fluid noted within the right  mastoid air cells. Mild mucosal thickening of the ethmoid air cells.  The remainder of the paranasal sinuses are clear.      Orbits: The visualized orbits are  unremarkable in appearance.      Vasculature: Increased T2 signal noted within the proximal right M2  branch (series 7 image 17 and 18) which is better evaluated on CTA of  the head from 09/02/2024 consistent with patient's known right M2  infarction. The remainder of the visualized flow voids display  expected low signal. There is dominance of the left vertebral artery  as compared to the right.      Impression: Findings consistent with known, acute right MCA infarction involving  the right parietal and temporal lobes as described without evidence  of significant mass effect, herniation, or hemorrhagic transformation.      I personally reviewed the images/study and I agree with the findings  as stated above by resident physician, Dr. Martín Argueta.      MACRO:  None      Signed by: Kirby Root 9/3/2024 12:05 PM  Dictation workstation:   [id number]      Physical Exam  Constitutional:       Appearance: Normal appearance.   HENT:      Head: Normocephalic and atraumatic.   Eyes:      Extraocular Movements: Extraocular movements intact.      Pupils: Pupils are equal, round, and reactive to light.   Cardiovascular:      Rate and Rhythm: Rhythm irregular.   Pulmonary:      Effort: Pulmonary effort is normal.      Breath sounds: Normal breath sounds.   Abdominal:      General: Abdomen is flat.      Palpations: Abdomen is soft.   Skin:     General: Skin is warm and dry.   Neurological:      Mental Status: He is alert.      Comments: CVA this admission   Psychiatric:         Mood and Affect: Mood normal.         Behavior: Behavior normal.         Relevant Results  {If you would like to pull in Medications, type .meds     If you would like to pull in Lab results for the last 24 hours, type .[id number]    If you would like to pull in Imaging results, type .imgrslt :99}      {Link to Stroke Scoring tools - Link :99}       Assessment/Plan   Regulo Marques is a 83 year old Male with a past medical history of A-fib, colon  cancer s/p colectomy, multiple small bowel obstructions, prostate cancer, unresectable rectal cancer s/p chemo and radiation therapy, diverticulitis, diabetes, hypertension, gout who presents to [facility] ED with strokelike symptoms.  Of note, he was admitted to the [facility] ED 8/12/2024 for with an upper GI bleed with hemoglobin of 6.1 requiring EGD and multiple transfusions.  Because of this he was taken off his Eliquis.  GI team was consulted regarding restarting his antiplatelet and anticoagulation therapy in setting of recent GI bleed.    9/4:     Acute CVA  Suspected recent upper GI bleed 8/12/2024  Hankins's esophagus  Gastritis with no focal ulcers  -Patient's Eliquis was stopped 8/12/2024 in setting of recent upper GI bleed requiring multiple transfusions.  Endoscopy during admission showed Hankins's esophagus and gastritis with no focal ulcers.  -Per medical note on 8/12/2024, patient was being considered for Watchman device outpatient  -In setting of CVA this admission and recent GI bleed with hemoglobin at 7.7 with baseline around 15, will hold anticoagulation but continue with aspirin.  -Discussed with neurology consult risks versus benefits of restarting anticoagulation given patient's suspect recent GI bleed with no source and recent stroke this admission.  Okay to restart anticoagulation as neurology consult sees fit to reduce risk of new stroke.  -Continue PPI 40 mg twice daily     Dr. Gurwinder Yeung   Internal Medicine PGY-1  Available on Pufetto for any questions.     This is a preliminary note pending signature from the attending physician.

## 2024-09-04 NOTE — NURSING NOTE
09/04/24 1030 Patient Navigator  I saw the patient again this am and he attempting to sleep. He denied any questions regarding the information and I reviewed warning signs of stroke. I discussed yesterday. I have updated the patient's RN, Collette, of my visit.   Shirley HALL, RN  Patient Navigator  Diabetes Care &   Stroke Educator

## 2024-09-04 NOTE — H&P
83m pmh of htn, lipids, oa, remote colon ca, afib recently had eliquis stopped d/t gib requiring transfusion at [facility]; also w/prior hematuria/known bph and ckd, admitted after acute onset exp asphasia/dysarthria and l boubacar at dinner last nite; imaging found acute r mca stroke; seen by neuro; also had moctezuma placed for ur ret (cr 2.3); asa commenced; not transferred to main campus for thrombectomy d/t risk candidacy concerns; pt still w/exp aphasia/dysarthria  Pmh;as above  Soch: comm dweller w/fam; no tob/etoh  famH; noncont  All/meds/labs noted  Exam: eld wm in bed; dysarthric/dysphasic; heent otherwise neg irreg, cta soft nt mild le edema some l hemiparesis present  A/p: acute r mca stroke, afib, recent gib; anemia, ckd, ur ret, bph, lipids, htn, et al  Asa rxd per neuro, rather dicey proposition all things considered; pt/ot/speech eval; supportive care; overall prognosis guarded     Assessment/Plan   Assessment & Plan  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

## 2024-09-04 NOTE — PROGRESS NOTES
Seen/exam/meds/labs/notes reviewed  Looks about the same  Apparently cardiology and neurology want to put him back on anticoagulation  Hgb 7.5  Still dysarthric and w/exp aphasia  Failed swallow eval  No ng placed (?per neuro conv w/nsg??)  Also, ua grossly positive but no ur sent for reasons difficult understand  (Has been on rocephin)  A/p: acute r mca stroke, exp dysphasia, dysarthria; ?uti; afib, htn, et al  Await uc; cont rocephin for now; I suspect the likelihood of pt tolerating uninterrupted anti-coagulation moving forward is extremely low; but will defer to the specialists;; for mbs tomorrow; ?ng; ?peg; prognosis guarded      Assessment & Plan  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

## 2024-09-04 NOTE — PROGRESS NOTES
Speech-Language Pathology    SLP Adult Inpatient Speech-Language Cognition    Patient Name: Regulo Marques  MRN: [id number]  Today's Date: 9/4/2024   Time Calculation  Start Time: 1000  Stop Time: 1015  Time Calculation (min): 15 min     Current Problem:   1. 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  Transthoracic Echo (TTE) Complete    Transthoracic Echo (TTE) Complete      2. Encounter for other specified special examinations  Transthoracic Echo (TTE) Complete        SLP Assessment:   Severe dysarthric speech following right MCA.   Left labio-facial weakness and tongue deviation to left.   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    Flat affect.   Reduced DDK rate but pt is able to approximate puh-tuh-kuh with distinction.   Pt would benefit from reduced rate of speech and exaggerated artic.      Speech goals: start date 9/4  Pt will learn and apply speech strategies to sentence production in conversation.   Pt/caregiver education.     SLP Plan:  Plan  SLP TX Plan: Continue Plan of Care  SLP Plan: Skilled SLP  SLP Frequency: 3x per week  Duration: 2 weeks  SLP Discharge Recommendations: Continue skilled SLP services at the next level of care  Discussed POC: Patient, Nursing  Discussed Risks/Benefits: Yes, Patient, Nursing  Patient/Caregiver Agreeable: Yes    Subjective   Pt seen bedside, upright, pleasant, cooperative.   General Visit Information:  General Information  Chart Reviewed: Yes  Caregiver Feedback:  (dysarthric, lying on his side for comfort.)  Reason for Referral: dysarthric speech  Prior to Session Communication: Bedside nurse  Pain:  Pain Assessment  Pain Assessment: 0-10  0-10 (Numeric) Pain Score: 0 - No pain   Motor Speech Production:  Motor Speech Production  Oral Motor : Impaired (left labio-facial droop. , tongue deviates left)  Automatic Speech: Counting to 10  Intelligibility:  "Impaired  Diadochokinetic Rate: \"Puh\" average syllables per sec, \"Tuh\" avaerage syllables per sec, \"Kuh\" average syllables per sec (slow, effortful, pt can articulate these sounds)  Respiratory Support: WFL  Motor Speech Disorder: Dysarthria    Inpatient:  Education Documentation  SLP has provided pt and caregiver/RN Collette with the results and recommendations of this session.         "

## 2024-09-05 ENCOUNTER — APPOINTMENT (OUTPATIENT)
Dept: RADIOLOGY | Facility: HOSPITAL | Age: 84
DRG: 065 | End: 2024-09-05
Payer: MEDICARE

## 2024-09-05 LAB
GLUCOSE BLD MANUAL STRIP-MCNC: 146 MG/DL (ref 74–99)
GLUCOSE BLD MANUAL STRIP-MCNC: 149 MG/DL (ref 74–99)
GLUCOSE BLD MANUAL STRIP-MCNC: 153 MG/DL (ref 74–99)
GLUCOSE BLD MANUAL STRIP-MCNC: 156 MG/DL (ref 74–99)
GLUCOSE BLD MANUAL STRIP-MCNC: 159 MG/DL (ref 74–99)

## 2024-09-05 PROCEDURE — 74230 X-RAY XM SWLNG FUNCJ C+: CPT | Performed by: STUDENT IN AN ORGANIZED HEALTH CARE EDUCATION/TRAINING PROGRAM

## 2024-09-05 PROCEDURE — 82947 ASSAY GLUCOSE BLOOD QUANT: CPT

## 2024-09-05 PROCEDURE — 92526 ORAL FUNCTION THERAPY: CPT | Mod: GN | Performed by: SPEECH-LANGUAGE PATHOLOGIST

## 2024-09-05 PROCEDURE — 97535 SELF CARE MNGMENT TRAINING: CPT | Mod: CO,GO

## 2024-09-05 PROCEDURE — 97535 SELF CARE MNGMENT TRAINING: CPT | Mod: GP,CQ

## 2024-09-05 PROCEDURE — 2500000004 HC RX 250 GENERAL PHARMACY W/ HCPCS (ALT 636 FOR OP/ED)

## 2024-09-05 PROCEDURE — 74230 X-RAY XM SWLNG FUNCJ C+: CPT

## 2024-09-05 PROCEDURE — 2500000005 HC RX 250 GENERAL PHARMACY W/O HCPCS: Performed by: INTERNAL MEDICINE

## 2024-09-05 PROCEDURE — 2500000001 HC RX 250 WO HCPCS SELF ADMINISTERED DRUGS (ALT 637 FOR MEDICARE OP): Performed by: PSYCHIATRY & NEUROLOGY

## 2024-09-05 PROCEDURE — 92611 MOTION FLUOROSCOPY/SWALLOW: CPT | Mod: GN | Performed by: SPEECH-LANGUAGE PATHOLOGIST

## 2024-09-05 PROCEDURE — 2500000002 HC RX 250 W HCPCS SELF ADMINISTERED DRUGS (ALT 637 FOR MEDICARE OP, ALT 636 FOR OP/ED): Performed by: NURSE PRACTITIONER

## 2024-09-05 PROCEDURE — 2500000004 HC RX 250 GENERAL PHARMACY W/ HCPCS (ALT 636 FOR OP/ED): Performed by: NURSE PRACTITIONER

## 2024-09-05 PROCEDURE — 2500000004 HC RX 250 GENERAL PHARMACY W/ HCPCS (ALT 636 FOR OP/ED): Performed by: INTERNAL MEDICINE

## 2024-09-05 PROCEDURE — 99232 SBSQ HOSP IP/OBS MODERATE 35: CPT | Performed by: PSYCHIATRY & NEUROLOGY

## 2024-09-05 PROCEDURE — 2060000001 HC INTERMEDIATE ICU ROOM DAILY

## 2024-09-05 RX ORDER — ASPIRIN 300 MG/1
150 SUPPOSITORY RECTAL DAILY
Status: DISCONTINUED | OUTPATIENT
Start: 2024-09-05 | End: 2024-09-05

## 2024-09-05 RX ORDER — INSULIN LISPRO 100 [IU]/ML
0-10 INJECTION, SOLUTION INTRAVENOUS; SUBCUTANEOUS
Status: DISCONTINUED | OUTPATIENT
Start: 2024-09-05 | End: 2024-09-08 | Stop reason: HOSPADM

## 2024-09-05 RX ORDER — ASPIRIN 81 MG/1
81 TABLET ORAL DAILY
Qty: 30 TABLET | Refills: 0 | Status: SHIPPED | OUTPATIENT
Start: 2024-09-06 | End: 2024-09-08 | Stop reason: SINTOL

## 2024-09-05 RX ORDER — ASPIRIN 81 MG/1
81 TABLET ORAL DAILY
Status: DISCONTINUED | OUTPATIENT
Start: 2024-09-06 | End: 2024-09-08 | Stop reason: HOSPADM

## 2024-09-05 RX ORDER — ATORVASTATIN CALCIUM 80 MG/1
80 TABLET, FILM COATED ORAL NIGHTLY
Qty: 30 TABLET | Refills: 0 | Status: ON HOLD | OUTPATIENT
Start: 2024-09-05

## 2024-09-05 ASSESSMENT — COGNITIVE AND FUNCTIONAL STATUS - GENERAL
TURNING FROM BACK TO SIDE WHILE IN FLAT BAD: A LITTLE
HELP NEEDED FOR BATHING: A LOT
TOILETING: TOTAL
MOVING TO AND FROM BED TO CHAIR: TOTAL
DAILY ACTIVITIY SCORE: 10
TURNING FROM BACK TO SIDE WHILE IN FLAT BAD: TOTAL
EATING MEALS: TOTAL
EATING MEALS: TOTAL
DRESSING REGULAR UPPER BODY CLOTHING: A LOT
TURNING FROM BACK TO SIDE WHILE IN FLAT BAD: A LITTLE
MOVING TO AND FROM BED TO CHAIR: TOTAL
PERSONAL GROOMING: A LITTLE
MOBILITY SCORE: 9
MOVING FROM LYING ON BACK TO SITTING ON SIDE OF FLAT BED WITH BEDRAILS: A LITTLE
HELP NEEDED FOR BATHING: TOTAL
STANDING UP FROM CHAIR USING ARMS: TOTAL
WALKING IN HOSPITAL ROOM: TOTAL
WALKING IN HOSPITAL ROOM: TOTAL
MOVING TO AND FROM BED TO CHAIR: TOTAL
DRESSING REGULAR UPPER BODY CLOTHING: TOTAL
TOILETING: TOTAL
EATING MEALS: A LITTLE
DRESSING REGULAR LOWER BODY CLOTHING: TOTAL
MOBILITY SCORE: 11
CLIMB 3 TO 5 STEPS WITH RAILING: TOTAL
DRESSING REGULAR LOWER BODY CLOTHING: TOTAL
CLIMB 3 TO 5 STEPS WITH RAILING: TOTAL
MOBILITY SCORE: 10
TOILETING: TOTAL
DAILY ACTIVITIY SCORE: 6
STANDING UP FROM CHAIR USING ARMS: A LOT
STANDING UP FROM CHAIR USING ARMS: TOTAL
DRESSING REGULAR UPPER BODY CLOTHING: A LOT
CLIMB 3 TO 5 STEPS WITH RAILING: TOTAL
DRESSING REGULAR LOWER BODY CLOTHING: A LOT
WALKING IN HOSPITAL ROOM: TOTAL
DAILY ACTIVITIY SCORE: 12
MOVING FROM LYING ON BACK TO SITTING ON SIDE OF FLAT BED WITH BEDRAILS: A LITTLE
HELP NEEDED FOR BATHING: A LOT
PERSONAL GROOMING: A LOT
PERSONAL GROOMING: TOTAL

## 2024-09-05 ASSESSMENT — PAIN SCALES - GENERAL
PAINLEVEL_OUTOF10: 2
PAINLEVEL_OUTOF10: 0 - NO PAIN

## 2024-09-05 ASSESSMENT — PAIN - FUNCTIONAL ASSESSMENT
PAIN_FUNCTIONAL_ASSESSMENT: 0-10

## 2024-09-05 ASSESSMENT — ACTIVITIES OF DAILY LIVING (ADL): HOME_MANAGEMENT_TIME_ENTRY: 15

## 2024-09-05 NOTE — PROGRESS NOTES
Cardiology Progress    Impression  Acute CVA  Chronic atrial fibrillation. CHADS Vasc 5  Recent GI bleed, requiring transfusion  MARIMAR on CKD  Hx of hematuria  Hx of Colon cancer  HTN     Plan:  Atrial fibrillation: chadvasc 6 in setting of acute stroke: OAC indicated and reasonable form GI perspective. Recommend eliquis 2.5 (age and creat) BID. Stop asa. Monitor HH    In the setting of acute stroke, LAAO/Watchman not indicated (high risk of perioperative CVA).  We will arrange follow-up in office based on clinical recovery to discuss further.     Troponin elevation supply demand ischemia      HPI:  No events.  Sleeping.  Meds:  Scheduled medications  aspirin, 150 mg, rectal, Daily  [Held by provider] atorvastatin, 80 mg, oral, Nightly  cefTRIAXone, 1 g, intravenous, q24h  insulin lispro, 0-10 Units, subcutaneous, q4h  pantoprazole, 40 mg, intravenous, Daily  tamsulosin, 0.4 mg, oral, Daily      Continuous medications  dextrose 5%-0.45 % sodium chloride, 75 mL/hr, Last Rate: 75 mL/hr (09/05/24 0538)  sodium chloride 0.9%, 50 mL/hr, Last Rate: 50 mL/hr (09/04/24 1836)      PRN medications  PRN medications: acetaminophen, dextrose, glucagon, metoprolol, morphine, oxygen    Physical exam:  Vitals:    09/05/24 0800   BP:    Pulse:    Resp:    Temp:    SpO2: 100%      Irregular bradycardic normal S1-S2 abdomen soft bilateral rales  Echo:  No results found for this or any previous visit.   Labs:  Lab Results   Component Value Date    WBC 8.2 09/04/2024    HGB 7.5 (L) 09/04/2024    HCT 24.1 (L) 09/04/2024     09/04/2024    CHOL 89 09/02/2024    TRIG 54 09/02/2024    HDL 45.5 09/02/2024    ALT 18 09/02/2024    AST 14 09/02/2024     09/04/2024    K 4.2 09/04/2024     (H) 09/04/2024    CREATININE 2.36 (H) 09/04/2024    BUN 50 (H) 09/04/2024    CO2 20 (L) 09/04/2024    INR 1.1 09/02/2024    HGBA1C 5.7 (H) 09/02/2024     par

## 2024-09-05 NOTE — CARE PLAN
Problem: Fall/Injury  Goal: Not fall by end of shift  Outcome: Progressing     Problem: Skin  Goal: Prevent/manage excess moisture  Outcome: Progressing  Flowsheets (Taken 9/5/2024 1958)  Prevent/manage excess moisture: Moisturize dry skin     Problem: General Stroke  Goal: Maintain BP within ordered limits throughout shift  Outcome: Progressing    The patient's goals for the shift include  get good sleep    The clinical goals for the shift include Maintain neurological status throughout end of shift    Patient having uneventful night overall, patient Aox4, garbled speech, expressive aphasia, L sided facial droop, L sided weakness, on RA, 1 assist for bed mobility. Patient admitted with embolic stroke, known L side effects. Patient able to take medications crushed with applesauce follow with nectar thickened water, extra time needed in between sips, some water spilling out of L side of mouth. Patient free of complaints, no pain reported. Patient able to express what he needs but can be hard to understand sometimes. Patient safety maintained, bed alarm active. Patient turns self from side to side with 1 assist.

## 2024-09-05 NOTE — PROGRESS NOTES
Speech-Language Pathology    SLP Adult Inpatient  Speech-Language Pathology Treatment     Patient Name: Regulo Marques  MRN: [id number]  Today's Date: 9/5/2024  Time Calculation  Start Time: 0850  Stop Time: 0915  Time Calculation (min): 25 min         Current Problem:   1. 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  Transthoracic Echo (TTE) Complete    Transthoracic Echo (TTE) Complete      2. Encounter for other specified special examinations  Transthoracic Echo (TTE) Complete        Therapeutic Swallow:  Therapeutic Swallow Intervention : PO Trials, Compensatory Strategies, Caregiver Education  Solid Diet Recommendations: NPO, MBSS today to determine oral diet vs feeding tube.   Liquid Diet Recommendations: NPO  Swallow Comments:  (MBSS scheduled for this morning .)    SLP tx bedside 9/5/24 per POC:  Light oral care provided d/t dry mouth; PO trials ice chips x3 and tsp sips water x3, with ongoing suspected pharyngeal dysphagia given suspected delay in swallow onset and reduced laryngeal rise on palpation. No overt s/s aspiration, no change in vocal quality, no change in respirations, SpO2 remains % throughout this session with PO trials.   Will complete MBSS this morning, see subsequent notes.       SLP TX Intervention Outcome: Making Progress Towards Goals  Prognosis: Guarded  Treatment Tolerance: Patient tolerated treatment well  Medical Staff Made Aware: Yes  Strengths: Motivation, Family/Caregiver Support  Barriers: Comorbidities  Education Provided: Yes     Plan:  SLP TX Plan: Continue Plan of Care  SLP Plan: Skilled SLP  SLP Frequency: 3x per week  Duration: 2 weeks  SLP Discharge Recommendations: Continue skilled SLP services at the next level of care  Discussed POC: Patient, Nursing  Discussed Risks/Benefits: Yes, Patient, Nursing  Patient/Caregiver Agreeable: Yes    Dysphagia Goals:  start date 9/3  Patient will participate in re-assessment of swallowing with SLP for  possible diet advancement from NPO as indicated.  9/5 ongoing, ready to proceed to MBS today.   When appropriate, patient will participate in Modified Barium Swallow (MBS) study due to oral dysphagia and/or suspected pharyngeal dysphagia.   9/5 scheduled for today     Pt/caregiver education.  9/5 ongoing, pt and RN today.     Subjective   Pt seen bedside, easily awakens when talked to and repositioned in bed.  Pt is agreeable to participate in session. Speech remains dysarthric.     General Visit Information:   Reason for Referral: dysarthric speech  Caregiver Feedback: very sleepy this morning per RN  Prior to Session Communication: Bedside nurse    Inpatient:  Education Documentation  SLP has provided pt and caregiver/RN Collette  with the results and recommendations of this session.

## 2024-09-05 NOTE — CARE PLAN
The patient's goals for the shift include  barium swallow evaluation.     The clinical goals for the shift include comfort and rest

## 2024-09-05 NOTE — PROGRESS NOTES
Speech-Language Pathology    SLP Adult Inpatient  Speech-Language Pathology Treatment     Patient Name: Regulo Marques  MRN: [id number]  Today's Date: 9/5/2024  Time Calculation  Start Time: 1220  Stop Time: 1240  Time Calculation (min): 20 min     MBSS completed this morning. Recommendations for puree diet and nectar thick liquids by teaspoon. 1:1 feed assist.     MBSS images reviewed during the study and immediately thereafter in the radiology suite.   MBSS report not yet written however, due to lengthy transfer of images from the c-arm; would like to review again prior to writing report, most likely will be filed tomorrow.    This bedside treatment completed to determine safely of PO intake with a larger quantity of PO as compared to the MBSS.     Current Problem:   1. 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  Transthoracic Echo (TTE) Complete    Transthoracic Echo (TTE) Complete      2. Encounter for other specified special examinations  Transthoracic Echo (TTE) Complete        Therapeutic Swallow:  Therapeutic Swallow Intervention : Compensatory Strategies, PO Trials, Caregiver Education  Solid Diet Recommendations: Pureed/extremely thick  (IDDSI Level 4)  Liquid Diet Recommendations: Nectar thick/mildly thick (IDDS Level 2) BY TEASPOON, no straws.    Meds crushed in applesauce if taking orally.       Swallow Strategies for safety:  -Fully upright and awake for PO intake, meals and meds.     -Modified diet, go slow, small bites/sips by tsp.   -Regulo needs EXTRA TIME between bites/sips.   -1:1 TOTAL FEED ASSIST.   -Ok for 2-3 ice chips after oral care with RN only.   -Monitor closely for changes in respiratory status.      SLP Tx at bedside 9/5 after MBS:   Pt is on room air.   PO trials in total of 4 oz puree/applesauce, and 3 oz nectar thick liquids by teaspoon, fed to patient at slow rate, single sips/bites, extra time between bites/sips for pt to swallow/double swallow as needed. Pt  "independently swallows and double swallows. Pt is able provided feedback throughout, he states he feels fine, throat feels good \"with food.\"   No overt s/s aspiration, no change in vocal quality or respirations throughout session, SpO2 remains 98%-100% with PO intake.      Will then suggest he continue an oral diet as recommended, with strict adherence to safe swallow strategies as listed above.   Pt is at an elevated aspiration risk given his dysphagia and medical condition. Please monitor closely.  Hold oral meds/meals if any concern for aspiration.     SLP TX Intervention Outcome: Making Progress Towards Goals  Prognosis: Good  Treatment Tolerance: Patient tolerated treatment well  Medical Staff Made Aware: Yes  Strengths: Cognition, Motivation, Family/Caregiver Support  Barriers: Comorbidities (CVA)  Education Provided: Yes    GOALS TO BE ADDED WHEN MBSS REPORT WRITTEN, most likely goals will by:  Patient will consume prescribed diet without clinical or overt s/s aspiration.  9/5 GOAL INITIATED TODAY, ONGOING   Pt/caregiver education. 9/5 GOAL INITIATED, ONGOING.      Plan:  SLP TX Plan: Continue Plan of Care  SLP Plan: Skilled SLP  SLP Frequency: 3x per week  Duration: 2 weeks  SLP Discharge Recommendations: Continue skilled speech therapy services post discharge  Discussed POC: Patient, Nursing  Discussed Risks/Benefits: Yes, Patient, Nursing  Patient/Caregiver Agreeable: Yes    Subjective   Pt seen bedside, repositioned fully upright for PO trials after MBSS earlier today to determine safety over the course of a meal as compared to MBSS trials only.    Pt is awake, pleasant, cooperative, agreeable to participate, wants to eat/drink.     General Visit Information:   Caregiver Feedback: very sleepy this morning per RN  Prior to Session Communication: Bedside nurse (RN Collette)    Inpatient:  Education Documentation  SLP has provided pt and caregiver/RN Collette with the results and recommendations of this " session.  Head of bed sign posted in room with diet level recommendations, and safer swallow strategies to apply during PO intake, meals and meds for staff and family review.

## 2024-09-05 NOTE — PROGRESS NOTES
"Regulo Marques is a 83 y.o. 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      Subjective   Seen at bedside this afternoon. Family not available.  Patient is very lethargic again however nurse states that he is active in the morning and gets tired quickly later in the day.        Objective     Last Recorded Vitals  Blood pressure 130/59, pulse 56, temperature 36.4 °C (97.5 °F), temperature source Temporal, resp. rate 18, height 1.753 m (5' 9.02\"), weight 93.2 kg (205 lb 7.5 oz), SpO2 98%.    Physical Exam  Neurological Exam  Relevant Results      NIH Stroke Scale  1A. Level of Consciousness: Arouses to Minor Stimulation  1B. Ask Month and Age: 1 Question Right  1C. Blink Eyes & Squeeze Hands: Performs 1 Task  2. Best Gaze: Partial Gaze Palsy  3. Visual: Partial Hemianopia  4. Facial Palsy: Partial Paralysis  5A. Motor - Left Arm: Some Effort Against Gravity  5B. Motor - Right Arm: No Drift  6A. Motor - Left Leg: Drift  6B. Motor - Right Leg: Drift  7. Limb Ataxia: Absent  8. Sensory Loss: Mild-to-Moderate Sensory Loss  9. Best Language: No Aphasia  10. Dysarthria: Severe Dysarthria  11. Extinction and Inattention: No Abnormality  NIH Stroke Scale: 14           Ubaldo Coma Scale  Best Eye Response: Spontaneous  Best Verbal Response: Oriented  Best Motor Response: Follows commands  Ubaldo Coma Scale Score: 15                               Assessment/Plan   This patient currently has cardiac telemetry ordered; if you would like to modify or discontinue the telemetry order, click here to go to the orders activity to modify/discontinue the order.  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 Marques is a 83 year old man whom presented with acute onset of left sided weakness and dysthria, found to have a right inferior MCA stroke with M2 occlusion.  Unable to get TNK due to recent GI bleed and to high risk " for thrombectomy.  Etiology of stroke likely cardioembolic from a. Fib. Patient off anti-coagulation due to recent GI hemorrhage. Per family patient was having issues with bleeding -both from bladder and GI tract - despite eliquis being lowered to 2.5 mg BID.  Discussed restarting anti-coagulation with GI team.  Etiology of hemorrhage unclear based on work up performed by [facility] team.       Past swallow evaluation. Switched meds to oral. Continue aspirin and lipitor for now.  Plan per cardiology is to restart Eliquis to lower stroke risk. Ok to restart Eliquis 2.5 mg BID (per cardiology) on 9/9/24 (7 days after stroke).  Closely monitor for signs of bleeding with restarting medication.  Management for a. Fib for possible watchmen procedure per cardiology.      Will sign off, please call with questions or concerns. Patient will need to follow up with neurology in 1-2 months after discharge.       Laly Rodriguez, DO

## 2024-09-05 NOTE — PROGRESS NOTES
Physical Therapy    Physical Therapy Treatment    Patient Name: Regulo Marques  MRN: [id number]  Today's Date: 9/5/2024  Time Calculation  Start Time: 1003  Stop Time: 1028  Time Calculation (min): 25 min  824-A       Assessment/Plan   PT Assessment  End of Session Communication: Bedside nurse  End of Session Patient Position: Bed, 3 rail up, Alarm on     PT Plan  Treatment/Interventions: Bed mobility, Transfer training, Gait training  PT Plan: Ongoing PT  PT Frequency: 5 times per week  PT Discharge Recommendations: High intensity level of continued care  PT - OK to Discharge: Yes      General Visit Information:   PT  Visit  PT Received On: 09/05/24  General  Co-Treatment: co-tx with OT to ensure safe therapeutic tx to facilitate maximum participation with skilled intervention  Prior to Session Communication: Bedside nurse  Patient Position Received: Bed, 4 rail up, Alarm off, not on at start of session (communicated with nursing regarding 4 rails being up)  General Comment:  (pt pleasant and agreeable to participate in therapy session.  still with dysarthric speech.  initially very lethargic though becoming more alert as tx progressed.  pt with several episodes of sudden rather vigorous tremors throughout tx; pt states d/t feeling cold.  vitals including temp taken, WNL.  communicated with RN.)    Subjective   Precautions:  Precautions  Medical Precautions: Fall precautions  Precautions Comment:  (L-side weakness.  IV.  moctezuma catheter.)    Vital Signs   HR 60s  SpO2 90s  temperature 37* celcius     Objective      Treatments:  Bed Mobility  Bed Mobility:  (sup > sit with min A x 1.  sit > sup with mod A x 2.  rolling R with SBA)    Ambulation/Gait Training  Ambulation/Gait Training Performed:  (pt able to perform x2 trials static stance ~2 min. each.  first trial pt keeping bilat knees flexed; unable to extend despite cuing.  second trial pt able to achieve improved upright stance with fully extended LEs.  pt however  continues to demo L-side lean requiring mod to max A to maintain, especially upon fatiguing.  pt also trials several sidesteps R however very unsteady and with difficulty maintaining bilat knee extension; unable to safely progress.)    Transfers  Transfer:  (sit <> stand x2 trials with FWW.  mod A x 2 initially though progressing to min A x 2 on subsequent trial.  cuing for sequencing with manual assist still required for placing/maintaining L hand on walker handle.)    Outcome Measures:  [facility] Basic Mobility  Turning from your back to your side while in a flat bed without using bedrails: A little  Moving from lying on your back to sitting on the side of a flat bed without using bedrails: A little  Moving to and from bed to chair (including a wheelchair): Total  Standing up from a chair using your arms (e.g. wheelchair or bedside chair): A lot  To walk in hospital room: Total  Climbing 3-5 steps with railing: Total  Basic Mobility - Total Score: 11    Education Documentation  Precautions, taught by Courtney Arriaza PTA at 9/5/2024  2:34 PM.  Learner: Patient  Readiness: Acceptance  Method: Explanation  Response: Verbalizes Understanding, Needs Reinforcement    Mobility Training, taught by Courtney Arriaza PTA at 9/5/2024  2:34 PM.  Learner: Patient  Readiness: Acceptance  Method: Explanation  Response: Verbalizes Understanding, Needs Reinforcement    Education Comments  No comments found.        EDUCATION:       Encounter Problems       Encounter Problems (Active)       PT Problem       STG - Pt will transition supine <> sitting with mod A x 1  (Progressing)       Start:  09/03/24    Expected End:  09/17/24            STG - Pt will transfer STS with mod A x 1  (Progressing)       Start:  09/03/24    Expected End:  09/17/24            STG - Pt will amb 15' using RW with min-mod A x 2  (Progressing)       Start:  09/03/24    Expected End:  09/17/24

## 2024-09-06 ENCOUNTER — HOSPITAL ENCOUNTER (OUTPATIENT)
Dept: CARDIOLOGY | Facility: HOSPITAL | Age: 84
Discharge: HOME | DRG: 065 | End: 2024-09-06
Payer: MEDICARE

## 2024-09-06 DIAGNOSIS — D64.9 ANEMIA: ICD-10-CM

## 2024-09-06 LAB
BACTERIA UR CULT: NO GROWTH
GLUCOSE BLD MANUAL STRIP-MCNC: 120 MG/DL (ref 74–99)
GLUCOSE BLD MANUAL STRIP-MCNC: 121 MG/DL (ref 74–99)
GLUCOSE BLD MANUAL STRIP-MCNC: 130 MG/DL (ref 74–99)
GLUCOSE BLD MANUAL STRIP-MCNC: 133 MG/DL (ref 74–99)

## 2024-09-06 PROCEDURE — 2500000004 HC RX 250 GENERAL PHARMACY W/ HCPCS (ALT 636 FOR OP/ED)

## 2024-09-06 PROCEDURE — 2500000004 HC RX 250 GENERAL PHARMACY W/ HCPCS (ALT 636 FOR OP/ED): Performed by: NURSE PRACTITIONER

## 2024-09-06 PROCEDURE — 2060000001 HC INTERMEDIATE ICU ROOM DAILY

## 2024-09-06 PROCEDURE — 92526 ORAL FUNCTION THERAPY: CPT | Mod: GN | Performed by: SPEECH-LANGUAGE PATHOLOGIST

## 2024-09-06 PROCEDURE — 82947 ASSAY GLUCOSE BLOOD QUANT: CPT

## 2024-09-06 PROCEDURE — 2500000002 HC RX 250 W HCPCS SELF ADMINISTERED DRUGS (ALT 637 FOR MEDICARE OP, ALT 636 FOR OP/ED): Performed by: PHYSICIAN ASSISTANT

## 2024-09-06 PROCEDURE — 2500000001 HC RX 250 WO HCPCS SELF ADMINISTERED DRUGS (ALT 637 FOR MEDICARE OP): Performed by: INTERNAL MEDICINE

## 2024-09-06 PROCEDURE — 93005 ELECTROCARDIOGRAM TRACING: CPT

## 2024-09-06 PROCEDURE — 97535 SELF CARE MNGMENT TRAINING: CPT | Mod: GP,CQ

## 2024-09-06 PROCEDURE — 92507 TX SP LANG VOICE COMM INDIV: CPT | Mod: GN | Performed by: SPEECH-LANGUAGE PATHOLOGIST

## 2024-09-06 PROCEDURE — 97535 SELF CARE MNGMENT TRAINING: CPT | Mod: CO,GO

## 2024-09-06 PROCEDURE — 99232 SBSQ HOSP IP/OBS MODERATE 35: CPT | Performed by: PSYCHIATRY & NEUROLOGY

## 2024-09-06 PROCEDURE — 2500000001 HC RX 250 WO HCPCS SELF ADMINISTERED DRUGS (ALT 637 FOR MEDICARE OP): Performed by: PSYCHIATRY & NEUROLOGY

## 2024-09-06 RX ORDER — ALUMINUM HYDROXIDE, MAGNESIUM HYDROXIDE, AND SIMETHICONE 1200; 120; 1200 MG/30ML; MG/30ML; MG/30ML
30 SUSPENSION ORAL ONCE
Status: COMPLETED | OUTPATIENT
Start: 2024-09-06 | End: 2024-09-06

## 2024-09-06 ASSESSMENT — COGNITIVE AND FUNCTIONAL STATUS - GENERAL
CLIMB 3 TO 5 STEPS WITH RAILING: TOTAL
TURNING FROM BACK TO SIDE WHILE IN FLAT BAD: A LITTLE
EATING MEALS: A LOT
WALKING IN HOSPITAL ROOM: TOTAL
STANDING UP FROM CHAIR USING ARMS: TOTAL
WALKING IN HOSPITAL ROOM: TOTAL
PERSONAL GROOMING: A LITTLE
STANDING UP FROM CHAIR USING ARMS: A LOT
MOBILITY SCORE: 10
HELP NEEDED FOR BATHING: A LOT
DAILY ACTIVITIY SCORE: 11
MOVING FROM LYING ON BACK TO SITTING ON SIDE OF FLAT BED WITH BEDRAILS: A LITTLE
DRESSING REGULAR LOWER BODY CLOTHING: TOTAL
MOVING TO AND FROM BED TO CHAIR: TOTAL
DRESSING REGULAR UPPER BODY CLOTHING: A LOT
MOBILITY SCORE: 11
CLIMB 3 TO 5 STEPS WITH RAILING: TOTAL
TURNING FROM BACK TO SIDE WHILE IN FLAT BAD: A LITTLE
MOVING FROM LYING ON BACK TO SITTING ON SIDE OF FLAT BED WITH BEDRAILS: A LITTLE
TOILETING: TOTAL
MOVING TO AND FROM BED TO CHAIR: TOTAL

## 2024-09-06 ASSESSMENT — PAIN SCALES - GENERAL
PAINLEVEL_OUTOF10: 0 - NO PAIN

## 2024-09-06 ASSESSMENT — PAIN - FUNCTIONAL ASSESSMENT
PAIN_FUNCTIONAL_ASSESSMENT: 0-10
PAIN_FUNCTIONAL_ASSESSMENT: 0-10

## 2024-09-06 ASSESSMENT — ACTIVITIES OF DAILY LIVING (ADL): HOME_MANAGEMENT_TIME_ENTRY: 15

## 2024-09-06 ASSESSMENT — PAIN SCALES - WONG BAKER: WONGBAKER_NUMERICALRESPONSE: NO HURT

## 2024-09-06 NOTE — PROGRESS NOTES
09/06/24 0915   Discharge Planning   Home or Post Acute Services Post acute facilities (Rehab/SNF/etc)   Type of Post Acute Facility Services Rehab   Expected Discharge Disposition IRF     Received update from Isabelle with acute rehab, [facility] is able to accept. Requested for direct pre-cert team to start pre-cert for [facility].

## 2024-09-06 NOTE — DISCHARGE SUMMARY
Dc summ  Dx: acute r mca stroke; afib, htn, h/o gib; h/o hematuria, anemia, ur ret  Proced/op none  Pt admitted w/above; sx included some l boubacar and dysarthria/dysphagia/dysphasia; seen by neuro and cards; cardiology endorsed return to anticoagulation and that mgmt will be deferred to them in this hi-risk/poor anticoagulation candidate; passed mbs and started on diet; by 9/5 was otherwise satisfactory for dc to snf; fu w/cards/neuro and urol (moctezuma placed for ur ret); condition stable but overall prognosis guarded to poor

## 2024-09-06 NOTE — PREADMISSION SCREENING NOTE
History of Present Illness:     [unfilled]     FL modified barium swallow study    Result Date: 9/6/2024  Interpreted By:  Arun Abernathy and Oskowski Bonnie STUDY: [place] MODIFIED BARIUM SWALLOW STUDY;; 9/5/2024 12:04 pm   INDICATION: Signs/Symptoms:dysphagia.     COMPARISON: None.   ACCESSION NUMBER(S): [id number]   ORDERING CLINICIAN: JESSICA CANALES   TECHNIQUE: Modified Barium Swallow Study completed. Informed verbal consent obtained prior to completion of exam. Pt upright in hausted chair, on room air. Trials of thin, nectar/mildly thick liquid, honey/moderately thick liquid, puree, were given. Of note, this study was complated on 9/5/24 and images were initially reviewed in the radiology suite.  However, final review of images and report was delayed by one day d/t lengthy transfer of images from c-arm used for this study.   SLP: Kennedi Hanley CCC-SLP Contact info: secure chat please   SPEECH FINDINGS: DIET RECOMMENDATIONS: Initiate an oral diet of PUREE and MILDLY THICK/NECTAR THICK LIQUIDS BY TEASPOON. No straws. Total 1:1 feed assist.   *MBSS is a controlled study, pt fully upright in hausted chair, fed at a controlled rate/pace.  Will suggest 1:1 feed with all PO intake to ensure upright positioning and pacing, close monitoring for safety.  Pt does self monitor and will provide feedback when he is eating/drinking.   STRATEGIES: -Fully awake and engaged for PO intake, hold meal tray if pt is drowsy or change in mentation or medical status. -Total feed assist at a slow rate, allow extra time between bites/sips. -Meds crushed in applesauce. -Present bolus to right side of mouth. Pt has left labial weakness.   Education Provided: SLP has provided pt and caregiver/RN Collette with the results and recommendations of this session.  Video images of this study were reviewed with pt so that he might have a better understanding of the oropharyngeal anatomy and swallow function. Head of bed sign posted in  "room with diet level recommendations, and safer swallow strategies to apply during PO intake, meals and meds.   Repeat study/ dc plan: Yes, in 2-3 weeks or sooner if needed, for diet texture advancement.   Mechanics of the Swallow Summary: ORAL PHASE: Lip Closure - Escape progressing to mid-chin  x1 during tsp sips. Left labial weakness. Tongue Control During Bolus Hold - did not test Bolus prep/mastication - did not test Bolus transport/lingual motion - Brisk tongue motion for A-P movement of the bolus Oral residue - Trace residue lining oral structures   PHARYNGEAL PHASE: Initiation of pharyngeal swallow - Bolus head at vallecular pit Soft palate elevation - No bolus between soft palate/pharyngeal wall Laryngeal elevation - Complete superior movement of thyroid cartilage with contact of arytenoids to epiglottic petiole Anterior hyoid excursion - Complete anterior movement Epiglottic movement - Partial inversion Laryngeal vestibule closure - Incomplete - narrow column of air/contrast in laryngeal vestibule Pharyngeal stripping wave - Complete Pharyngeal contraction (A/P view) - Not tested Pharyngoesophageal segment opening - Partial distension/partial duration with partial obstruction of flow of bolus Tongue base retraction - Trace column of contrast or air between tongue base and pharyngeal wall Pharyngeal residue - Collection of residue within or on the pharyngeal structures  noted pyriform residue after straw sips only. Trace valleculae residue with all consistencies.   ESOPHAGEAL PHASE: Esophageal clearance - Not evaluated   SLP Impressions with Severity Rating: Mild oral and pharyngeal phases of the swallow, see impairments noted above \"Mechanics of the Swallow\"   -Reduced BOT retraction, reduced epiglottic inversion, and incomplete laryngeal vestibular closure result in trace amount of aspiration with thin liquids. -There is no significant retained pharyngeal residue after the swallow. -No aspiration with " thick liquids consistency, both nectar and honey. -No aspiration seen with puree barium. -All consistencies were presented by tsp, with the exception of x1 straw sip of nectar, which resulted in the bolus advancing to the pyriforms prior to swallow onset, delay in swallow onset, with eventual swallow, clearing the bolus from the pharynx, and deep laryngeal penetration during the swallow that clears. Therefore, do not use straws.   Strategies attempted- Effortful swallow, no difference in swallow observed.   Rosenbek's Penetration Aspiration Scale Thin Liquids: 8. SILENT ASPIRATION - contrast passes glottis, visible residue, NO pt response , during the swallow, trace/silent, clears. Nectar Thick Liquids: 2. PENETRATION that CLEARS - contrast enter airway, above vocal cords, no residue Honey Thick Liquids: 2. PENETRATION that CLEARS - contrast enter airway, above vocal cords, no residue Puree: 1. NO ASPIRATION & NO PENETRATION - no aspiration, contrast does not enter airway Soft Solids: did not test for safety. Solids: did not test for safety.   Plan: Treatment/Interventions: Pharyngeal exercises, Patient/family education, Bolus trials, Compensatory strategy training, Diet tolerance/advancement SLP Plan: Skilled SLP warranted SLP Frequency: 2x per week Discussed POC: Patient Discussed Risks/Benefits: Yes Patient/Caregiver Agreeable: Yes   Short term goals established 24: 1. Patient will consume prescribed diet without clinical or overt s/s aspiration. 2. Pt/caregiver education.   Reason for Referral: referral for further pharyngeal swallow testing after bedside assessment. 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 Unable to get TNK due to recent GI bleed and to high risk for thrombectomy Respiratory Status: Room air Current diet: NPO   Pain: Pain Scale: 0-10 Ratin during this session.   Speech  Therapy section of this report signed by Kennedi Hanley MA CCC-SLP On 9/5/2024 at 8:23 am.   RADIOLOGY FINDINGS: I concur with the aforementioned findings.   Radiology section of this report signed by Michela CLARK.       1. Please see above details regarding the stages of the study as well as the speech therapist's notes and recommendations.   MACRO: None   Signed by: Arun Abernathy 9/6/2024 10:42 AM Dictation workstation:   [id number]    ECG 12 lead    Result Date: 9/3/2024  Atrial fibrillation Nonspecific IVCD with LAD Inferior infarct, old Consider anterior infarct Lateral leads are also involved See ED provider note for full interpretation and clinical correlation Confirmed by Flakita Tineo ([id number]) on 9/3/2024 5:25:43 PM    Transthoracic Echo (TTE) Complete    Result Date: 9/3/2024   [facility], [address] [place] [address]           Tel [phone] and Fax [phone] TRANSTHORACIC ECHOCARDIOGRAM REPORT  Patient Name:      KEN White Physician:    [id number] Gomez Flores MD Study Date:        9/3/2024             Ordering Provider:    [id number] DARIN CALVILLO MRN/PID:           [id number]             Fellow: Accession#:        [id number]         Nurse:                Sofi Muñoz RN Date of Birth/Age: 1940 / 83 years Sonographer:          Stephanie Colin RDCS Gender:            M                    Additional Staff: Height:            175.00 cm            Admit Date:           9/2/2024 Weight:            93.00 kg             Admission Status:     Inpatient -                                                               Routine BSA / BMI:         2.09 m2 / 30.37      Encounter#:           [id number]                    kg/m2  Blood Pressure:    106/54 mmHg          Department Location:  [address] floor                                                               [facility] Study Type:    TRANSTHORACIC ECHO (TTE) COMPLETE Diagnosis/ICD: Encounter for other specified special examinations-Z01.89 Indication:    Acute stroke due to embolism of right middle cerebral artery (;; CPT Code:      Echo Complete w Full Doppler-[id number] Patient History: Pertinent History: Onset of left sided weakness, expressive aphasia, dysarthria,                    mental status change, and slurred speech. Study Detail: The following Echo studies were performed: 2D, M-Mode, Doppler,               color flow and 3D. Technically challenging study due to body               habitus and the patient's lack of cooperation. Agitated saline               used as a contrast agent for intraseptal flow evaluation.  PHYSICIAN INTERPRETATION: Left Ventricle: The left ventricular systolic function is normal, with a visually estimated ejection fraction of 65-70%. There are no regional left ventricular wall motion abnormalities. The left ventricular cavity size is normal. There is severe concentric left ventricular hypertrophy. Left ventricular diastolic filling was indeterminate. Left Atrium: The left atrium is moderately dilated. A bubble study using agitated saline was performed. Bubble study is negative. Right Ventricle: The right ventricle is normal in size. There is normal right ventricular global systolic function. Right Atrium: The right atrium is mildly dilated. Aortic Valve: The aortic valve appears structurally normal. The aortic valve dimensionless index is 0.62. There is no evidence of aortic valve regurgitation. The peak instantaneous gradient of the aortic valve is 7.7 mmHg. The mean gradient of the aortic valve is 5.0 mmHg. Mitral Valve: The mitral valve is mildly thickened. The mitral valve spectral Doppler A-wave is absent, consistent with atrial fibrillation.  There is trace mitral valve regurgitation. Tricuspid Valve: The tricuspid valve is structurally normal. There is trace tricuspid regurgitation. The Doppler estimated RVSP is mildly elevated at 36.7 mmHg. Pulmonic Valve: The pulmonic valve is structurally normal. There is no indication of pulmonic valve regurgitation. Pericardium: There is a trivial to small pericardial effusion. Pleural: There is a moderate left pleural effusion. Aorta: The aortic root is normal. There is mild dilatation of the ascending aorta. There is mild dilatation of the aortic root. Systemic Veins: The inferior vena cava appears dilated. There is IVC inspiratory collapse greater than 50%. In comparison to the previous echocardiogram(s): There are no prior studies on this patient for comparison purposes.  CONCLUSIONS:  1. The left ventricular systolic function is normal, with a visually estimated ejection fraction of 65-70%.  2. Left ventricular diastolic filling was indeterminate.  3. There is severe concentric left ventricular hypertrophy.  4. There is normal right ventricular global systolic function.  5. The left atrium is moderately dilated.  6. There is a moderate pleural effusion.  7. Absent A-wave on MV spectral Doppler tracing, consistent with atrial fibrillation.  8. Bubble study is negative.  9. Mildly elevated RVSP. RECOMMENDATIONS: Based on the results of this study, it is recommended that the patient is considered for a cardiac MRI.  QUANTITATIVE DATA SUMMARY: 2D MEASUREMENTS:                         Normal Ranges: LAs:           5.50 cm  (2.7-4.0cm) IVSd:          1.90 cm  (0.6-1.1cm) LVPWd:         1.70 cm  (0.6-1.1cm) LVIDd:         4.30 cm  (3.9-5.9cm) LVIDs:         2.60 cm LV Mass Index: 165 g/m2 LVEDV Index:   35 ml/m2 LV % FS        39.5 % LA VOLUME:                               Normal Ranges: LA Vol A4C:        111.0 ml   (22+/-6mL/m2) LA Vol A2C:        78.0 ml LA Vol BP:         95.7 ml LA Vol Index A4C:  53.2ml/m2 LA  Vol Index A2C:  37.4 ml/m2 LA Vol Index BP:   45.9 ml/m2 LA Area A4C:       29.8 cm2 LA Area A2C:       25.7 cm2 LA Major Axis A4C: 6.8 cm LA Major Axis A2C: 7.2 cm LA Volume Index:   45.9 ml/m2 RA VOLUME BY A/L METHOD:                               Normal Ranges: RA Vol A4C:        76.0 ml    (8.3-19.5ml) RA Vol Index A4C:  36.4 ml/m2 RA Area A4C:       25.4 cm2 RA Major Axis A4C: 7.2 cm M-MODE MEASUREMENTS:                  Normal Ranges: Ao Root: 3.20 cm (2.0-3.7cm) LAs:     5.20 cm (2.7-4.0cm) AORTA MEASUREMENTS:                      Normal Ranges: Ao Sinus, d: 4.00 cm (2.1-3.5cm) Ao STJ, d:   3.60 cm (1.7-3.4cm) Asc Ao, d:   3.90 cm (2.1-3.4cm) LV SYSTOLIC FUNCTION BY 2D PLANIMETRY (MOD):                      Normal Ranges: EF-A4C View:    44 % (>=55%) EF-A2C View:    57 % EF-Biplane:     45 % EF-Visual:      68 % LV EF Reported: 68 % LV DIASTOLIC FUNCTION:                             Normal Ranges: PulmV Sys Himanshu:  63.50 cm/s PulmV Farfan Himanshu: 104.00 cm/s PulmV S/D Himanshu:  0.60 AORTIC VALVE:                                   Normal Ranges: AoV Vmax:                1.39 m/s (<=1.7m/s) AoV Peak P.7 mmHg (<20mmHg) AoV Mean P.0 mmHg (1.7-11.5mmHg) LVOT Max Himanshu:            0.85 m/s (<=1.1m/s) AoV VTI:                 29.50 cm (18-25cm) LVOT VTI:                18.40 cm LVOT Diameter:           1.80 cm  (1.8-2.4cm) AoV Area, VTI:           1.59 cm2 (2.5-5.5cm2) AoV Area,Vmax:           1.55 cm2 (2.5-4.5cm2) AoV Dimensionless Index: 0.62  RIGHT VENTRICLE: RV Basal 4.00 cm RV Mid   3.10 cm RV Major 8.2 cm TAPSE:   14.2 mm RV s'    0.07 m/s TRICUSPID VALVE/RVSP:                             Normal Ranges: Peak TR Velocity: 2.68 m/s Est. RA Pressure: 8 mmHg RV Syst Pressure: 36.7 mmHg (< 30mmHg) IVC Diam:         2.58 cm PULMONIC VALVE:                      Normal Ranges: PV Max Himanshu: 1.1 m/s  (0.6-0.9m/s) PV Max P.5 mmHg PV Mean PG: 3.0 mmHg PV VTI:     20.80 cm Pulmonary Veins: PulmV Farfan  Himanshu: 104.00 cm/s PulmV S/D Himanshu:  0.60 PulmV Sys Himanshu:  63.50 cm/s  [id number] Gomez Flores MD Electronically signed on 9/3/2024 at 2:51:57 PM  ** Final **     MR brain wo IV contrast    Result Date: 9/3/2024  Interpreted By:  Kirby Root and Liller Gregory STUDY: MR BRAIN WO IV CONTRAST; 9/3/2024 11:33 am   INDICATION: Signs/Symptoms:stroke.   COMPARISON: CT brain attack 09/02/2024 at 8:22 p.m. and 8:11 p.m.   ACCESSION NUMBER(S): [id number]   ORDERING CLINICIAN: DARIN CALVILLO   TECHNIQUE: Multiplanar, multisequential MR imaging of the brain was performed without contrast material.   FINDINGS: Parenchyma: Diffusion restriction with associated T2 and FLAIR hyperintense signal noted within the right parietal lobe extending into the superior aspect of the right temporal lobe as well as along the posterior margin of the right frontal lobe precentral gyrus consistent with patient's known history of right MCA infarction.   There is mild associated locoregional gyral swelling and sulcal effacement. No hemorrhage is noted. No significant midline shift or herniation.   CSF Spaces: The ventricles, sulci and basal cisterns are within normal limits. No abnormal extra-axial fluid collection.   Paranasal Sinuses and Mastoids: Trace fluid noted within the right mastoid air cells. Mild mucosal thickening of the ethmoid air cells. The remainder of the paranasal sinuses are clear.   Orbits: The visualized orbits are unremarkable in appearance.   Vasculature: Increased T2 signal noted within the proximal right M2 branch (series 7 image 17 and 18) which is better evaluated on CTA of the head from 09/02/2024 consistent with patient's known right M2 infarction. The remainder of the visualized flow voids display expected low signal. There is dominance of the left vertebral artery as compared to the right.       Findings consistent with known, acute right MCA infarction involving the right parietal and temporal lobes as described  without evidence of significant mass effect, herniation, or hemorrhagic transformation.   I personally reviewed the images/study and I agree with the findings as stated above by resident physician, Dr. Martín Argueta.   MACRO: None   Signed by: Kojocharles Ivett 9/3/2024 12:05 PM Dictation workstation:   [id number]    XR chest 1 view    Result Date: 9/2/2024  STUDY: Chest Radiograph;  09/02/2024 at 10:08 PM INDICATION: Stroke. COMPARISON: XR chest and abdomen 04/15/21. XR chest 10/09/20. ACCESSION NUMBER(S): [id number] ORDERING CLINICIAN: DARIN CALVILLO TECHNIQUE:  Frontal chest was obtained at 2208 hours. FINDINGS: CARDIOMEDIASTINAL SILHOUETTE: Cardiomediastinal silhouette is normal in size and configuration.  LUNGS: Lungs are clear.  ABDOMEN: No remarkable upper abdominal findings.  BONES: No acute osseous changes.    No acute pulmonary pathology. Signed by Aram De Jesus MD    CT brain attack head wo IV contrast    Result Date: 9/2/2024  Interpreted By:  Gomez Rosenthal, STUDY: CT BRAIN ATTACK HEAD WO IV CONTRAST; CT BRAIN ATTACK ANGIO HEAD AND NECK W AND WO IV CONTRAST;  9/2/2024 8:11 pm; 9/2/2024 8:22 pm   INDICATION: Signs/Symptoms:Stroke Evaluation; Signs/Symptoms:left sided weakness   COMPARISON: None.   ACCESSION NUMBER(S): [id number]; [id number]   ORDERING CLINICIAN: SHANKAR NESS   TECHNIQUE: Multiple contiguous axial noncontrast images of the head were obtained. Following IV contrast administration of iodinated contrast, a CT angiography of the head and neck was performed. MIPS and 3D reconstructions of the [place] and neck were created on an independent workstation and reviewed.   FINDINGS: NON-CONTRAST HEAD CT:   BRAIN PARENCHYMA:  No evidence of acute intraparenchymal hemorrhage. No mass-effect, midline shift or effacement of cerebral sulci. There is suggestion for slight loss of gray-white differentiation involving the right insula.   VENTRICLES and EXTRA-AXIAL SPACES: Along the  right frontal lobe there is a 0.5 cm low-density subdural fluid collection (coronal image 31, 02/02/2004; sagittal image 40, series 203))measuring no greater than 0.5 cm concerning for subacute or chronic subdural hematoma. Otherwise no acute extra-axial hemorrhage. Ventricles and sulci are age-concordant.   PARANASAL SINUSES/MASTOIDS:  No hemorrhage or air-fluid levels within the visualized paranasal sinuses. The mastoids are well aerated.   CALVARIUM/ORBITS:  No skull fracture.  The orbits and globes are intact to the extent visualized.   EXTRACRANIAL SOFT TISSUES: No discernible abnormality.       CTA NECK:   Partially visualized left pleural effusion. There is pericardial fluid in the superior aortic recess.   LEFT VERTEBRAL ARTERY: Mild atherosclerosis of the origin. No hemodynamically significant stenosis, occlusion, or dissection.   LEFT COMMON/INTERNAL CAROTID ARTERY: Minimal atherosclerosis at the bulb. No hemodynamically significant stenosis, occlusion, or dissection.   RIGHT VERTEBRAL ARTERY: Mild atherosclerosis of the origin. No hemodynamically significant stenosis, occlusion, or dissection.   RIGHT COMMON/INTERNAL CAROTID ARTERY: Mild atherosclerosis at the bulb. No hemodynamically significant stenosis, occlusion, or dissection.     The neck soft tissues show no evidence of mass, fluid collection, or enlarged lymph nodes.   There is no acute osseous abnormality.         CTA HEAD:   ANTERIOR CIRCULATION: No aneurysm.   - Internal Carotid Arteries: Minimal calcific atherosclerosis bilaterally. No hemodynamically significant stenosis or occlusion.   - Middle Cerebral Arteries: There is an acute complete occlusion involving the right M2 inferior division branch. It transiently reconstitutes but then again occludes at the M3 branch point and there is a paucity of M4 cortical opacification involving its territory throughout the right parietal and posterior temporal regions.   - Anterior Cerebral Arteries:   No hemodynamically significant stenosis or occlusion.     POSTERIOR CIRCULATION: No aneurysm.   - Intracranial Vertebral Arteries: Mild calcific atherosclerosis on the left. No hemodynamically significant stenosis or occlusion.   - Basilar Artery:  No hemodynamically significant stenosis or occlusion.   - Posterior Cerebral Arteries:  No hemodynamically significant stenosis or occlusion.   No arteriovenous malformation is visualized. No pathologic intracranial enhancement or discrete mass. The dural venous sinuses are patent.   MIPS and 3D reconstructions confirm the above findings.       Acute complete occlusion involving the right M2 inferior division branch. It transiently reconstitutes but then again occludes at the M3 branch point and there is a slight paucity of M4 cortical opacification involving its territory throughout the right parietal and posterior temporal regions.   Otherwise, no hemodynamically significant intracranial or extracranial stenosis, occlusion, or aneurysm.   Suspect a early loss in the gray-white differentiation of the insula.   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   Moderate-sized left pleural effusion.   MACRO: Gomez Rosenthal discussed the significance and urgency of this critical finding via "[employer] with  SHANKAR NESS on 9/2/2024 at 8:23 pm.  (**-RCF-**) Findings:  See findings.   Signed by: Gomez Rosenthal 9/2/2024 8:37 PM Dictation workstation:   [id number]    CT brain attack angio head and neck W and WO IV contrast    Result Date: 9/2/2024  Interpreted By:  Gomez Rosenthal, STUDY: CT BRAIN ATTACK HEAD WO IV CONTRAST; CT BRAIN ATTACK ANGIO HEAD AND NECK W AND WO IV CONTRAST;  9/2/2024 8:11 pm; 9/2/2024 8:22 pm   INDICATION: Signs/Symptoms:Stroke Evaluation; Signs/Symptoms:left sided weakness    COMPARISON: None.   ACCESSION NUMBER(S): [id number]; [id number]   ORDERING CLINICIAN: SHANKAR NESS   TECHNIQUE: Multiple contiguous axial noncontrast images of the head were obtained. Following IV contrast administration of iodinated contrast, a CT angiography of the head and neck was performed. MIPS and 3D reconstructions of the [place] of [place] and neck were created on an independent workstation and reviewed.   FINDINGS: NON-CONTRAST HEAD CT:   BRAIN PARENCHYMA:  No evidence of acute intraparenchymal hemorrhage. No mass-effect, midline shift or effacement of cerebral sulci. There is suggestion for slight loss of gray-white differentiation involving the right insula.   VENTRICLES and EXTRA-AXIAL SPACES: Along the right frontal lobe there is a 0.5 cm low-density subdural fluid collection (coronal image 31, 02/02/2004; sagittal image 40, series 203))measuring no greater than 0.5 cm concerning for subacute or chronic subdural hematoma. Otherwise no acute extra-axial hemorrhage. Ventricles and sulci are age-concordant.   PARANASAL SINUSES/MASTOIDS:  No hemorrhage or air-fluid levels within the visualized paranasal sinuses. The mastoids are well aerated.   CALVARIUM/ORBITS:  No skull fracture.  The orbits and globes are intact to the extent visualized.   EXTRACRANIAL SOFT TISSUES: No discernible abnormality.       CTA NECK:   Partially visualized left pleural effusion. There is pericardial fluid in the superior aortic recess.   LEFT VERTEBRAL ARTERY: Mild atherosclerosis of the origin. No hemodynamically significant stenosis, occlusion, or dissection.   LEFT COMMON/INTERNAL CAROTID ARTERY: Minimal atherosclerosis at the bulb. No hemodynamically significant stenosis, occlusion, or dissection.   RIGHT VERTEBRAL ARTERY: Mild atherosclerosis of the origin. No hemodynamically significant stenosis, occlusion, or dissection.   RIGHT COMMON/INTERNAL CAROTID ARTERY: Mild atherosclerosis at the bulb. No hemodynamically  significant stenosis, occlusion, or dissection.     The neck soft tissues show no evidence of mass, fluid collection, or enlarged lymph nodes.   There is no acute osseous abnormality.         CTA HEAD:   ANTERIOR CIRCULATION: No aneurysm.   - Internal Carotid Arteries: Minimal calcific atherosclerosis bilaterally. No hemodynamically significant stenosis or occlusion.   - Middle Cerebral Arteries: There is an acute complete occlusion involving the right M2 inferior division branch. It transiently reconstitutes but then again occludes at the M3 branch point and there is a paucity of M4 cortical opacification involving its territory throughout the right parietal and posterior temporal regions.   - Anterior Cerebral Arteries:  No hemodynamically significant stenosis or occlusion.     POSTERIOR CIRCULATION: No aneurysm.   - Intracranial Vertebral Arteries: Mild calcific atherosclerosis on the left. No hemodynamically significant stenosis or occlusion.   - Basilar Artery:  No hemodynamically significant stenosis or occlusion.   - Posterior Cerebral Arteries:  No hemodynamically significant stenosis or occlusion.   No arteriovenous malformation is visualized. No pathologic intracranial enhancement or discrete mass. The dural venous sinuses are patent.   MIPS and 3D reconstructions confirm the above findings.       Acute complete occlusion involving the right M2 inferior division branch. It transiently reconstitutes but then again occludes at the M3 branch point and there is a slight paucity of M4 cortical opacification involving its territory throughout the right parietal and posterior temporal regions.   Otherwise, no hemodynamically significant intracranial or extracranial stenosis, occlusion, or aneurysm.   Suspect a early loss in the gray-white differentiation of the insula.   Age-indeterminate subdural hematoma focally overlying the right frontal lobe measuring no greater than 0.5 cm in maximal thickness and  demonstrates a density just above CSF, favoring chronic or subacute aged.   Moderate-sized left pleural effusion.   MACRO: Gomez Rosenthal discussed the significance and urgency of this critical finding via [employer] with  SHANKAR NESS on 9/2/2024 at 8:23 pm.  (**-RCF-**) Findings:  See findings.   Signed by: Gomez Rosenthal 9/2/2024 8:37 PM Dictation workstation:   [id number]       Results for orders placed or performed during the hospital encounter of 09/02/24 (from the past 96 hour(s))   POCT GLUCOSE   Result Value Ref Range    POCT Glucose 219 (H) 74 - 99 mg/dL   CBC and Auto Differential   Result Value Ref Range    WBC 8.1 4.4 - 11.3 x10*3/uL    nRBC 0.0 0.0 - 0.0 /100 WBCs    RBC 2.44 (L) 4.50 - 5.90 x10*6/uL    Hemoglobin 7.7 (L) 13.5 - 17.5 g/dL    Hematocrit 24.5 (L) 41.0 - 52.0 %     80 - 100 fL    MCH 31.6 26.0 - 34.0 pg    MCHC 31.4 (L) 32.0 - 36.0 g/dL    RDW 16.2 (H) 11.5 - 14.5 %    Platelets 190 150 - 450 x10*3/uL    Neutrophils % 75.0 40.0 - 80.0 %    Immature Granulocytes %, Automated 0.4 0.0 - 0.9 %    Lymphocytes % 15.3 13.0 - 44.0 %    Monocytes % 6.8 2.0 - 10.0 %    Eosinophils % 2.3 0.0 - 6.0 %    Basophils % 0.2 0.0 - 2.0 %    Neutrophils Absolute 6.09 (H) 1.60 - 5.50 x10*3/uL    Immature Granulocytes Absolute, Automated 0.03 0.00 - 0.50 x10*3/uL    Lymphocytes Absolute 1.24 0.80 - 3.00 x10*3/uL    Monocytes Absolute 0.55 0.05 - 0.80 x10*3/uL    Eosinophils Absolute 0.19 0.00 - 0.40 x10*3/uL    Basophils Absolute 0.02 0.00 - 0.10 x10*3/uL   Comprehensive metabolic panel   Result Value Ref Range    Glucose 209 (H) 74 - 99 mg/dL    Sodium 139 136 - 145 mmol/L    Potassium 4.1 3.5 - 5.3 mmol/L    Chloride 108 (H) 98 - 107 mmol/L    Bicarbonate 18 (L) 21 - 32 mmol/L    Anion Gap 17 10 - 20 mmol/L    Urea Nitrogen 60 (H) 6 - 23 mg/dL    Creatinine 3.07 (H) 0.50 - 1.30 mg/dL    eGFR 19 (L) >60 mL/min/1.73m*2    Calcium 8.8 8.6 - 10.3 mg/dL    Albumin 3.4 3.4 - 5.0 g/dL    Alkaline  Phosphatase 104 33 - 136 U/L    Total Protein 5.9 (L) 6.4 - 8.2 g/dL    AST 14 9 - 39 U/L    Bilirubin, Total 0.3 0.0 - 1.2 mg/dL    ALT 18 10 - 52 U/L   Troponin I, High Sensitivity   Result Value Ref Range    Troponin I, High Sensitivity 143 (HH) 0 - 20 ng/L   Protime-INR   Result Value Ref Range    Protime 12.3 9.8 - 12.8 seconds    INR 1.1 0.9 - 1.1   APTT   Result Value Ref Range    aPTT 29 27 - 38 seconds   Lipid Panel   Result Value Ref Range    Cholesterol 89 0 - 199 mg/dL    HDL-Cholesterol 45.5 mg/dL    Cholesterol/HDL Ratio 2.0     LDL Calculated 33 <=99 mg/dL    VLDL 11 0 - 40 mg/dL    Triglycerides 54 0 - 149 mg/dL    Non HDL Cholesterol 44 0 - 149 mg/dL   Lavender Top   Result Value Ref Range    Extra Tube Hold for add-ons.    PST Top   Result Value Ref Range    Extra Tube Hold for add-ons.    Hemoglobin A1C   Result Value Ref Range    Hemoglobin A1C 5.7 (H) see below %    Estimated Average Glucose 117 Not Established mg/dL   B-Type Natriuretic Peptide   Result Value Ref Range     (H) 0 - 99 pg/mL   ECG 12 lead   Result Value Ref Range    Ventricular Rate 69 BPM    Atrial Rate 0 BPM    CT Interval 153 ms    QRS Duration 116 ms    QT Interval 442 ms    QTC Calculation(Bazett) 474 ms    R Axis -85 degrees    T Axis 98 degrees    QRS Count 11 beats    Q Onset 249 ms    T Offset 470 ms    QTC Fredericia 463 ms   Troponin I, High Sensitivity   Result Value Ref Range    Troponin I, High Sensitivity 144 (HH) 0 - 20 ng/L   POCT GLUCOSE   Result Value Ref Range    POCT Glucose 154 (H) 74 - 99 mg/dL   POCT GLUCOSE   Result Value Ref Range    POCT Glucose 152 (H) 74 - 99 mg/dL   POCT GLUCOSE   Result Value Ref Range    POCT Glucose 125 (H) 74 - 99 mg/dL   Lavender Top   Result Value Ref Range    Extra Tube Hold for add-ons.    Troponin I, High Sensitivity   Result Value Ref Range    Troponin I, High Sensitivity 139 (HH) 0 - 20 ng/L   Transthoracic Echo (TTE) Complete   Result Value Ref Range    AV pk taim  1.39 m/s    AV mn grad 5.0 mmHg    LVOT diam 1.80 cm    LA vol index A/L 45.9 ml/m2    Tricuspid annular plane systolic excursion 1.4 cm    LV EF 68 %    RV free wall pk S' 7.18 cm/s    LVIDd 4.30 cm    RVSP 36.7 mmHg    Aortic Valve Area by Continuity of VTI 1.59 cm2    Aortic Valve Area by Continuity of Peak Velocity 1.55 cm2    AV pk grad 7.7 mmHg    LV A4C EF 43.6    POCT GLUCOSE   Result Value Ref Range    POCT Glucose 148 (H) 74 - 99 mg/dL   Basic Metabolic Panel   Result Value Ref Range    Glucose 125 (H) 74 - 99 mg/dL    Sodium 140 136 - 145 mmol/L    Potassium 4.2 3.5 - 5.3 mmol/L    Chloride 111 (H) 98 - 107 mmol/L    Bicarbonate 20 (L) 21 - 32 mmol/L    Anion Gap 13 10 - 20 mmol/L    Urea Nitrogen 57 (H) 6 - 23 mg/dL    Creatinine 2.67 (H) 0.50 - 1.30 mg/dL    eGFR 23 (L) >60 mL/min/1.73m*2    Calcium 9.0 8.6 - 10.3 mg/dL   CBC   Result Value Ref Range    WBC 8.4 4.4 - 11.3 x10*3/uL    nRBC 0.0 0.0 - 0.0 /100 WBCs    RBC 2.49 (L) 4.50 - 5.90 x10*6/uL    Hemoglobin 7.9 (L) 13.5 - 17.5 g/dL    Hematocrit 24.8 (L) 41.0 - 52.0 %     80 - 100 fL    MCH 31.7 26.0 - 34.0 pg    MCHC 31.9 (L) 32.0 - 36.0 g/dL    RDW 16.1 (H) 11.5 - 14.5 %    Platelets 193 150 - 450 x10*3/uL   POCT GLUCOSE   Result Value Ref Range    POCT Glucose 126 (H) 74 - 99 mg/dL   Urinalysis with Reflex Microscopic   Result Value Ref Range    Color, Urine Colorless (N) Light-Yellow, Yellow, Dark-Yellow    Appearance, Urine Turbid (N) Clear    Specific Gravity, Urine 1.022 1.005 - 1.035    pH, Urine 5.5 5.0, 5.5, 6.0, 6.5, 7.0, 7.5, 8.0    Protein, Urine 30 (1+) (A) NEGATIVE, 10 (TRACE), 20 (TRACE) mg/dL    Glucose, Urine Normal Normal mg/dL    Blood, Urine OVER (3+) (A) NEGATIVE    Ketones, Urine NEGATIVE NEGATIVE mg/dL    Bilirubin, Urine NEGATIVE NEGATIVE    Urobilinogen, Urine Normal Normal mg/dL    Nitrite, Urine NEGATIVE NEGATIVE    Leukocyte Esterase, Urine 500 Sona/µL (A) NEGATIVE   Microscopic Only, Urine   Result Value Ref  Range    WBC, Urine >50 (A) 1-5, NONE /HPF    WBC Clumps, Urine FEW Reference range not established. /HPF    RBC, Urine >20 (A) NONE, 1-2, 3-5 /HPF    Bacteria, Urine 1+ (A) NONE SEEN /HPF    Mucus, Urine FEW Reference range not established. /LPF    Amorphous Crystals, Urine 1+ NONE, 1+, 2+ /HPF   POCT GLUCOSE   Result Value Ref Range    POCT Glucose 124 (H) 74 - 99 mg/dL   POCT GLUCOSE   Result Value Ref Range    POCT Glucose 122 (H) 74 - 99 mg/dL   POCT GLUCOSE   Result Value Ref Range    POCT Glucose 120 (H) 74 - 99 mg/dL   POCT GLUCOSE   Result Value Ref Range    POCT Glucose 115 (H) 74 - 99 mg/dL   Basic Metabolic Panel   Result Value Ref Range    Glucose 115 (H) 74 - 99 mg/dL    Sodium 142 136 - 145 mmol/L    Potassium 4.2 3.5 - 5.3 mmol/L    Chloride 115 (H) 98 - 107 mmol/L    Bicarbonate 20 (L) 21 - 32 mmol/L    Anion Gap 11 10 - 20 mmol/L    Urea Nitrogen 50 (H) 6 - 23 mg/dL    Creatinine 2.36 (H) 0.50 - 1.30 mg/dL    eGFR 26 (L) >60 mL/min/1.73m*2    Calcium 8.9 8.6 - 10.3 mg/dL   CBC and Auto Differential   Result Value Ref Range    WBC 8.2 4.4 - 11.3 x10*3/uL    nRBC 0.2 (H) 0.0 - 0.0 /100 WBCs    RBC 2.39 (L) 4.50 - 5.90 x10*6/uL    Hemoglobin 7.5 (L) 13.5 - 17.5 g/dL    Hematocrit 24.1 (L) 41.0 - 52.0 %     (H) 80 - 100 fL    MCH 31.4 26.0 - 34.0 pg    MCHC 31.1 (L) 32.0 - 36.0 g/dL    RDW 16.1 (H) 11.5 - 14.5 %    Platelets 197 150 - 450 x10*3/uL    Neutrophils % 83.8 40.0 - 80.0 %    Immature Granulocytes %, Automated 0.4 0.0 - 0.9 %    Lymphocytes % 8.3 13.0 - 44.0 %    Monocytes % 5.6 2.0 - 10.0 %    Eosinophils % 1.7 0.0 - 6.0 %    Basophils % 0.2 0.0 - 2.0 %    Neutrophils Absolute 6.84 (H) 1.60 - 5.50 x10*3/uL    Immature Granulocytes Absolute, Automated 0.03 0.00 - 0.50 x10*3/uL    Lymphocytes Absolute 0.68 (L) 0.80 - 3.00 x10*3/uL    Monocytes Absolute 0.46 0.05 - 0.80 x10*3/uL    Eosinophils Absolute 0.14 0.00 - 0.40 x10*3/uL    Basophils Absolute 0.02 0.00 - 0.10 x10*3/uL   POCT  GLUCOSE   Result Value Ref Range    POCT Glucose 107 (H) 74 - 99 mg/dL   POCT GLUCOSE   Result Value Ref Range    POCT Glucose 128 (H) 74 - 99 mg/dL   POCT GLUCOSE   Result Value Ref Range    POCT Glucose 123 (H) 74 - 99 mg/dL   Urinalysis with Reflex Culture and Microscopic   Result Value Ref Range    Color, Urine Light-Yellow Light-Yellow, Yellow, Dark-Yellow    Appearance, Urine Turbid (N) Clear    Specific Gravity, Urine 1.019 1.005 - 1.035    pH, Urine 5.5 5.0, 5.5, 6.0, 6.5, 7.0, 7.5, 8.0    Protein, Urine 70 (1+) (A) NEGATIVE, 10 (TRACE), 20 (TRACE) mg/dL    Glucose, Urine Normal Normal mg/dL    Blood, Urine 0.2 (2+) (A) NEGATIVE    Ketones, Urine NEGATIVE NEGATIVE mg/dL    Bilirubin, Urine NEGATIVE NEGATIVE    Urobilinogen, Urine Normal Normal mg/dL    Nitrite, Urine NEGATIVE NEGATIVE    Leukocyte Esterase, Urine 500 Sona/µL (A) NEGATIVE   Microscopic Only, Urine   Result Value Ref Range    WBC, Urine >50 (A) 1-5, NONE /HPF    WBC Clumps, Urine OCCASIONAL Reference range not established. /HPF    RBC, Urine 11-20 (A) NONE, 1-2, 3-5 /HPF    Mucus, Urine FEW Reference range not established. /LPF    Amorphous Crystals, Urine 1+ NONE, 1+, 2+ /HPF   Urine Culture    Specimen: Indwelling (Buck) Catheter; Urine   Result Value Ref Range    Urine Culture No growth    POCT GLUCOSE   Result Value Ref Range    POCT Glucose 131 (H) 74 - 99 mg/dL   POCT GLUCOSE   Result Value Ref Range    POCT Glucose 149 (H) 74 - 99 mg/dL   POCT GLUCOSE   Result Value Ref Range    POCT Glucose 153 (H) 74 - 99 mg/dL   POCT GLUCOSE   Result Value Ref Range    POCT Glucose 156 (H) 74 - 99 mg/dL   POCT GLUCOSE   Result Value Ref Range    POCT Glucose 159 (H) 74 - 99 mg/dL   POCT GLUCOSE   Result Value Ref Range    POCT Glucose 146 (H) 74 - 99 mg/dL   POCT GLUCOSE   Result Value Ref Range    POCT Glucose 121 (H) 74 - 99 mg/dL          Precautions: (Diet, Cardiac, Aspiration,  Falls      Current Facility-Administered Medications:      acetaminophen (Tylenol) suppository 650 mg, 650 mg, rectal, q6h PRN, Paul Casarez MD, 650 mg at 09/04/24 1629    aspirin EC tablet 81 mg, 81 mg, oral, Daily, Laly Rodriguez DO, 81 mg at 09/06/24 0951    atorvastatin (Lipitor) tablet 80 mg, 80 mg, oral, Nightly, Laly Rodriguez DO, 80 mg at 09/05/24 2019    cefTRIAXone (Rocephin) 1 g in dextrose (iso) IV 50 mL, 1 g, intravenous, q24h, Sarahi Barnes, TENZIN-CNP, Stopped at 09/05/24 2031    dextrose 50 % injection 12.5 g, 12.5 g, intravenous, q15 min PRN, MARCI Molina    glucagon (Glucagen) injection 1 mg, 1 mg, intramuscular, q15 min PRN, MARCI Molina    insulin lispro (HumaLOG) injection 0-10 Units, 0-10 Units, subcutaneous, Before meals & nightly, Paul Casarez MD    metoprolol tartrate (Lopressor) injection 5 mg, 5 mg, intravenous, Once PRN, MARCI Molina    morphine injection 2 mg, 2 mg, intravenous, q2h PRN, Paul Casarez MD    oxygen (O2) therapy, , inhalation, Continuous PRN - O2/gases, MARCI Molina    pantoprazole (ProtoNix) injection 40 mg, 40 mg, intravenous, Daily, MARCI Molina, 40 mg at 09/06/24 0951    tamsulosin (Flomax) 24 hr capsule 0.4 mg, 0.4 mg, oral, Daily, Amalia Sandra PA-C, 0.4 mg at 09/06/24 0951     Allergies   Allergen Reactions    Codeine Unknown        Visit Vitals  /65 (BP Location: Right arm, Patient Position: Lying)   Pulse 66   Temp 36.6 °C (97.9 °F) (Temporal)   Resp 18        Past Medical History:   Diagnosis Date    Diverticulitis of intestine, part unspecified, without perforation or abscess without bleeding     Diverticulitis    Personal history of malignant neoplasm, unspecified     History of malignant neoplasm    Personal history of other diseases of the circulatory system     History of hypertension    Personal history of other diseases of the digestive system     History of diverticulitis    Personal history of  other endocrine, nutritional and metabolic disease     History of diabetes mellitus    Personal history of other malignant neoplasm of large intestine     History of malignant neoplasm of colon    Personal history of other specified conditions     History of abdominal pain       Past Surgical History:   Procedure Laterality Date    COLONOSCOPY  10/20/2016    Colonoscopy (Fiberoptic)    OTHER SURGICAL HISTORY  11/08/2019    Gallbladder surgery    OTHER SURGICAL HISTORY  11/08/2019    Carpal tunnel surgery    TONSILLECTOMY  10/20/2016    Tonsillectomy    TOTAL HIP ARTHROPLASTY  10/20/2016    Total Hip Replacement        Social Determinants of Health     Tobacco Use: Medium Risk (9/2/2024)    Patient History     Smoking Tobacco Use: Former     Smokeless Tobacco Use: Never     Passive Exposure: Not on file   Alcohol Use: Not At Risk (9/3/2024)    AUDIT-C     Frequency of Alcohol Consumption: Never     Average Number of Drinks: Patient does not drink     Frequency of Binge Drinking: Never   Financial Resource Strain: Low Risk  (9/3/2024)    Overall Financial Resource Strain (CARDIA)     Difficulty of Paying Living Expenses: Not very hard   Food Insecurity: Not on file   Transportation Needs: No Transportation Needs (9/3/2024)    PRAPARE - Transportation     Lack of Transportation (Medical): No     Lack of Transportation (Non-Medical): No   Physical Activity: Not on file   Stress: Not on file   Social Connections: Not on file   Intimate Partner Violence: Not on file   Depression: Not at risk (9/3/2024)    PHQ-2     PHQ-2 Score: 0   Housing Stability: Low Risk  (9/3/2024)    Housing Stability Vital Sign     Unable to Pay for Housing in the Last Year: No     Number of Times Moved in the Last Year: 0     Homeless in the Last Year: No   Utilities: Not on file   Digital Equity: Not on file   Health Literacy: Not on file        The patient does not have a history of falls. I did not complete a risk assessment for falls. A plan  of care for falls was documented. NAVNEET FALL RISK COMPLETED BY RN    Substance Abuse History:    family history is not on file.      reports no history of alcohol use.     Tobacco Use: Medium Risk (9/2/2024)    Patient History     Smoking Tobacco Use: Former     Smokeless Tobacco Use: Never     Passive Exposure: Not on file         reports no history of drug use.       No problem-specific Assessment & Plan notes found for this encounter.       Rehabilitation goals and plans:  Anticipated date of rehabilitation admission:9/6/24        Special Rehab Needs:     Cognition Impairment:aphasic    Dietary: pureed,nectar thick liq      Rehabilitation Needs:  Therapy    2.  Medical Management- MONITOR AFIB, LOW H&H        Barriers to Discharge:  Barriers:   Limited caregiver availability    Requires caregiver assist  Cognition  Function    Comments:    Strategies to overcome barriers:    Caregiver/family education and training   DME     Team collaboration    Anticipated post-discharge destination/services        Anticipated services upon discharge: (select all that apply)        Home health services - RN, Aide, PT/OT/ST        Recommended support group/services- [facility]         Physicians Anticipated Interventions:    Required treatments/services: (select all that apply)  Rehabilitation nursing, dietician/nutrition, case management, social work    Required therapy:   Physical Therapy : 90 minutes 5 days/week   Occupational Therapy: 90 minutes 5 days/week    SPEECH THERAPY 45 MIN A DAY MON- FRI    HPI     2. Physician oversight/management     Patient Active Problem List   Diagnosis    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        Requires 24 hour rehab physician availability with visits at least 3x/week for 4    3. 24-hour rehab nursing care and interventions   Requires 24 hour rehab nursing to assess B&B,SKIN BREAKDOWN, NUTRITION, MONITOR PVRS ONCE GANDHI REMOVED. TOILET Q 2-3 HRS.  1. Acute  stroke due to embolism of right middle cerebral artery (Multi)  Transthoracic Echo (TTE) Complete    Transthoracic Echo (TTE) Complete    atorvastatin (Lipitor) 80 mg tablet    aspirin 81 mg EC tablet      2. Encounter for other specified special examinations  Transthoracic Echo (TTE) Complete                Support therapeutic exerciese and ADL/mobility skills at the bedside (transfers, swallow strategies, bed mobility, ambulation)       4. Intensive therapies:    Patient requires intensive and multiple therapies including PT for improved:     Therapeutic exercises  Gait Training   Transfer training  Balance and mobility training  Exercises to improve balance and mobility  Exercises to improve strength and endurance      Patient requires intensive and multiple therapies including OT for improved:  Balance and mobility training  Exercises to improve balance and mobility  ADL retraining for grooming, bathing, ADL activities  Exercises to improve strength and endurance  Cognitive skills and training      Patient requires intensive and multiple therapies including ST for improved:  Cognitive skills and training  Speech and language training  High level executive functioning training  Swallow advancement and training    5. Discharge planner//   Discharge planner will assist with ensuring safe discharge to the community including, but not limited to:     DME supplies, Medications, scheduling follow-up appointments, providing information on support groups, community resources.    6. Patient meets full rehab criteria:   Patient has expressed willingness to participate in the rehab program, can tolerate 3 hours of therapy per day, and has a reasonable expectation to make significant gains in a shoart amount of time to return home with support.     Physical Medicine and Rehabilitation  Preadmission Screening    Rehab Physician's Review and Admission Determination:  Regulo Marques is a 83 y.o. male  who needs acute inpatient rehabilitation in order to achieve the functional goals outlined below. He requires close rehabilitation physician monitoring and management due to his complex medical conditions and co-morbidities with the primary indication of:  Rehab Admission Indication: Stroke: Stroke: 0001.1 Left Body Involvement (Right Brain). Comorbid conditions that will impact course of rehabilitation: Comorbid Conditions in addition to those listed above A FIB . He requires 24-hour rehabilitation nursing to manage bowel and bladder function, skin care, nutrition and fluid intake, pulmonary hygiene, pain control, safety, medication management, and patient/family goals. In addition, rehabilitation nursing will reiterate and reinforce therapy skills and equipment use, including ADLs, as well as provide education to the patient and family. Regulo Marques is willing to participate in and is able to tolerate the proposed plan of care.    History of Present Illness: History Of Present Illness  ADMITTED TO [facility] 9/2/24.Regulo Marques is a 83 y.o. male presenting to the ER with acute onset of left sided weakness, expressive aphasia, dysarthria, mental status change, and slurred speech. Pt. Was out to dinner with his wife tonight when these symptoms developed. He was apparently in his normal state of health prior to dinner. He was taking Eliquis for A-Fib but was taken off of the Eliquis on 8/12/2024 2/2 an admission at [facility] for a GI bleed. Pt.    Prior Functional Status:  Lives with Spouse. tHEY ARE MOVING INTO [facility]DEP[facility]ENT L[facility]ING .  Self-Care: IND  Ambulation: IND  Stairs: IND  Cognition: A&OX3  Wheelchair: N/A  Devices: NONE    Current Functional Status:  Eating: MIN  Oral hygiene: CGA  Toileting: DEPENDENT- GANDHI  Bathing: MAX  Upper body dressing: MOD  Lower body dressing: MAX  Bed Mobility: MIN MOD  Transfers: MIN MOD  Bladder and Bowel: INCONT STOOL. HAS GANDHI FOR URINARY RETENTION  Cognition: A&O  ,APHASIC    Rehabilitation Goals and Plan:  Expected level of improvement: CGA  Likelihood of reaching these goals: fair.  Therapy treatments needed to achieve these goals: physical therapy, occupational therapy, speech therapy, nursing, aide, and CM .  Barriers to achieving these goals: Limited family support   Expected length of stay: 2 week(s)    When medically stable, anticipated discharge disposition: home with home health care  The potential to achieve that is fair.

## 2024-09-06 NOTE — NURSING NOTE
Verified with pharmacist Chani xie for pt to receive one time dose of mylanta as ordered by .     Ok per pharmacist.

## 2024-09-06 NOTE — PROGRESS NOTES
"Regulo Marques is a 83 y.o. 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      Subjective   Long discussion with his wife and patient at bedside about risk/benefit of restarting anti-coagulation.        Objective     Last Recorded Vitals  Blood pressure 129/61, pulse 54, temperature 36.5 °C (97.7 °F), temperature source Temporal, resp. rate 18, height 1.753 m (5' 9.02\"), weight 93.2 kg (205 lb 7.5 oz), SpO2 98%.    Physical Exam  Neurological Exam  Relevant Results  Scheduled medications  aspirin, 81 mg, oral, Daily  atorvastatin, 80 mg, oral, Nightly  cefTRIAXone, 1 g, intravenous, q24h  insulin lispro, 0-10 Units, subcutaneous, Before meals & nightly  pantoprazole, 40 mg, intravenous, Daily  tamsulosin, 0.4 mg, oral, Daily      Continuous medications     PRN medications  PRN medications: acetaminophen, dextrose, glucagon, metoprolol, morphine, oxygen    Results for orders placed or performed during the hospital encounter of 09/02/24 (from the past 24 hour(s))   POCT GLUCOSE   Result Value Ref Range    POCT Glucose 146 (H) 74 - 99 mg/dL   POCT GLUCOSE   Result Value Ref Range    POCT Glucose 121 (H) 74 - 99 mg/dL   POCT GLUCOSE   Result Value Ref Range    POCT Glucose 130 (H) 74 - 99 mg/dL   POCT GLUCOSE   Result Value Ref Range    POCT Glucose 133 (H) 74 - 99 mg/dL            NIH Stroke Scale  1A. Level of Consciousness: Arouses to Minor Stimulation  1B. Ask Month and Age: 1 Question Right  1C. Blink Eyes & Squeeze Hands: Performs 1 Task  2. Best Gaze: Partial Gaze Palsy  3. Visual: Partial Hemianopia  4. Facial Palsy: Partial Paralysis  5A. Motor - Left Arm: Some Effort Against Gravity  5B. Motor - Right Arm: No Drift  6A. Motor - Left Leg: Drift  6B. Motor - Right Leg: Drift  7. Limb Ataxia: Absent  8. Sensory Loss: Mild-to-Moderate Sensory Loss  9. Best Language: No Aphasia  10. Dysarthria: Severe Dysarthria  11. Extinction and Inattention: No " Abnormality  NIH Stroke Scale: 14           Ubaldo Coma Scale  Best Eye Response: Spontaneous  Best Verbal Response: Oriented  Best Motor Response: Follows commands  Leighton Coma Scale Score: 15                             Assessment/Plan   This patient currently has cardiac telemetry ordered; if you would like to modify or discontinue the telemetry order, click here to go to the orders activity to modify/discontinue the order.  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 Marques is a 83 year old man whom presented with acute onset of left sided weakness and dysthria, found to have a right inferior MCA stroke with M2 occlusion.  Unable to get TNK due to recent GI bleed and to high risk for thrombectomy.  Etiology of stroke likely cardioembolic from a. Fib. Patient off anti-coagulation due to recent GI hemorrhage. Per family patient was having issues with bleeding -both from bladder and GI tract - despite eliquis being lowered to 2.5 mg BID.  Discussed restarting anti-coagulation with GI team.  Etiology of hemorrhage unclear based on work up performed by [facility] team.       Discussed in detail risk/benefits of restarting anti-coagulation with patient and family. They are going to discuss it amongst themselves and decide if he is agreeable to restarting it. She is concerned that the source of bleeding came from the colon and if he needs a colonscopy  before restarting it.  I will reach out to GI to get their opinion about this questions.     I spent 45 minutes in the professional and overall care of this patient.      Laly Rodriguez, DO

## 2024-09-06 NOTE — PROGRESS NOTES
Physical Therapy    Physical Therapy Treatment    Patient Name: Regulo Marques  MRN: [id number]  Today's Date: 9/6/2024  Time Calculation  Start Time: 1334  Stop Time: 1409  Time Calculation (min): 35 min  824-A       Assessment/Plan   PT Assessment  End of Session Communication: PCT/NA/CTA  End of Session Patient Position: Bed, 3 rail up, Alarm off, not on at start of session     PT Plan  Treatment/Interventions: Bed mobility, Transfer training, Gait training  PT Plan: Ongoing PT  PT Frequency: 5 times per week  PT Discharge Recommendations: High intensity level of continued care  PT - OK to Discharge: Yes      General Visit Information:   PT  Visit  PT Received On: 09/06/24  General  Family/Caregiver Present:  (several visitors arriving at conclusion of tx session)  Co-Treatment: co-tx with OT to ensure safe therapeutic tx to facilitate maximum participation with skilled intervention  Prior to Session Communication: Bedside nurse  Patient Position Received: Bed, 4 rail up, Alarm off, not on at start of session (communicated to nursing staff regarding four bedrails)  General Comment:  (pt pleasant and agreeable to participate in therapy session.  still with dysarthric speech though much improved this date.  initially very lethargic though becoming more alert as tx progressed.)    Subjective   Precautions:  Precautions  Medical Precautions: Fall precautions  Precautions Comment:  (L-side weakness. moctezuma catheter)    Vital Signs    SpO2 98% (room air)          Objective   Pain:  Pain Assessment  Pain Assessment: 0-10  0-10 (Numeric) Pain Score: 0 - No pain     Treatments:  Therapeutic Exercise  Therapeutic Exercise Performed:  (seated BLE AP and LAQ x ~10 ea.  cuing for slowed, more controlled movements LLE though pt with difficulty achieving.)    Bed Mobility  Bed Mobility:  (sup > sit with CGA;  HOB slightly elevated and bedrail used to assist.  sit > sup with mod A x 2.  rolling R with SBA.  improved sitting balance  this date with pt able to tolerate up to 15 min. largely SBA though up to min A needed at times 2/2 retro and somewhat L-sided leaning.)    Ambulation/Gait Training  Ambulation/Gait Training Performed:  (pt able to perform x2 trials static stance of ~2 min. each.  pt also able to perform several sidesteps L along EOB.  FWW and max A x 2 throughout.  pt demos continuous heavy L-side lean; much cuing to correct though pt largely unable to demo any improvement.)    Transfers  Transfer:  (sit <> stand x2 trials with FWW.  min A x 1 initially however pt requiring mod A x 2 on subsequent trial.  cuing for safe hand/feet placement, sequencing, and upright posture.  manual assist still required for placing/maintaining L hand on walker handle.)    Outcome Measures:  [facility] Basic Mobility  Turning from your back to your side while in a flat bed without using bedrails: A little  Moving from lying on your back to sitting on the side of a flat bed without using bedrails: A little  Moving to and from bed to chair (including a wheelchair): Total  Standing up from a chair using your arms (e.g. wheelchair or bedside chair): A lot  To walk in hospital room: Total  Climbing 3-5 steps with railing: Total  Basic Mobility - Total Score: 11    Education Documentation  Precautions, taught by Courtney Arriaza PTA at 9/6/2024  3:08 PM.  Learner: Patient  Readiness: Acceptance  Method: Explanation  Response: Verbalizes Understanding, Needs Reinforcement    Mobility Training, taught by Courtney Arriaza PTA at 9/6/2024  3:08 PM.  Learner: Patient  Readiness: Acceptance  Method: Explanation  Response: Verbalizes Understanding, Needs Reinforcement    Education Comments  No comments found.        EDUCATION:       Encounter Problems       Encounter Problems (Active)       PT Problem       STG - Pt will transition supine <> sitting with mod A x 1  (Progressing)       Start:  09/03/24    Expected End:  09/17/24            STG - Pt will transfer STS with  mod A x 1  (Progressing)       Start:  09/03/24    Expected End:  09/17/24            STG - Pt will amb 15' using RW with min-mod A x 2  (Progressing)       Start:  09/03/24    Expected End:  09/17/24

## 2024-09-06 NOTE — PROGRESS NOTES
Speech-Language Pathology    SLP Adult Inpatient  Speech-Language Pathology Treatment     Patient Name: Regulo Marques  MRN: [id number]  Today's Date: 9/6/2024  Current Problem:   1. 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  Transthoracic Echo (TTE) Complete    Transthoracic Echo (TTE) Complete    atorvastatin (Lipitor) 80 mg tablet    aspirin 81 mg EC tablet      2. Encounter for other specified special examinations  Transthoracic Echo (TTE) Complete        Therapeutic Swallow:  Therapeutic Swallow Intervention : PO Trials, Compensatory Strategies, Caregiver Education  Solid Diet Recommendations: Pureed/extremely thick  (IDDSI Level 4)  Liquid Diet Recommendations: Nectar Thick (IDDSI level 2)    STRATEGIES:  -Fully awake and engaged for PO intake, hold meal tray if pt is drowsy or change in mentation or medical status.   -Total feed assist at a slow rate, allow extra time between bites/sips.   -Meds crushed in applesauce.   -Present bolus to right side of mouth. Pt has left labial weakness.   -Pt can have 2-3 ice chips with RN only.    SLP Tx at Bedside 9/6/24  Dysphagia: PO trials at bedside of nectar thick liquid by teaspoon and puree. During the session, pt demonstrates x1 cough with nectar thick liquid otherwise no overt s/s of aspiration on the trials. SPO2 remained in the high 90s throughout the session. Pt exhibits no changes in respirations or vocal quality.  RN present in room and provides meds crushed in apple sauce. No overt s/s of aspiration.  RN reports pt consumed lunch and dinner yesterday 9/5 with concerns for aspiration.    Motor Speech: Pt provided training for consistent use of speech strategies in multi syllabic words with the goals of carryover to sentence production. Pt demonstrates good eye contact and engagement in this task. Mild to moderate dysarthric speech but appears more intelligible today compared to yesterday. Pt fatigues easily and is unable to complete  any additional speech exercises.   SLP TX Intervention Outcome: Making Progress Towards Goals  SLP Assessment Results: Motor Speech Deficits  Prognosis: Fair  Treatment Tolerance: Patient tolerated treatment well  Medical Staff Made Aware: Yes  Strengths: Cognition, Motivation, Family/Caregiver Support  Barriers: Comorbidities (CVA)  Education Provided: Yes     Plan:  Treatment/Interventions: Patient/family education (Dysphagia tx, motor speech tx)  SLP Discharge Recommendations: Continue skilled speech therapy services post discharge, D/C as patient is functional for this level of care  Patient/Caregiver Agreeable: Yes  SLP - OK to Discharge: Yes (Per Physician to next level of care)    Short term goals established 09/05/24:   Patient will consume prescribed diet without clinical or overt s/s aspiration.  9.6 GOAL ONGOING.  2.    Pt/caregiver education. 9.6 GOAL ONGOING.     Speech goals: start date 9/4  Pt will learn and apply speech strategies to sentence production in conversation. 9.6 ONGOING.  Pt/caregiver education. 9.6 ONGOING.    Subjective   PT seen bedside, positioned upright for PO trials and medications. Pt is awake, pleasant, cooperative and agreeable to participate in tx. Pt's intelligibility appears to have improved as noted in informal conversation. Pt was eager for water today.     General Visit Information:   Caregiver Feedback:  (Rn reports ate lunch and dinner yesterday without difficulty)  Prior to Session Communication: Bedside nurse (RN Collette)    Objective   Motor Speech:   Motor Speech Intervention :  (Multisyllabic word production with overarticulation)  Inpatient:  Education Documentation  SLP provides patient and caregiver/rn Collette with the results and recommendation of the session.     EVI HERNANDEZ-SLP   SLP provided this student with direct supervision throughout the entire session.

## 2024-09-06 NOTE — PROGRESS NOTES
Speech-Language Pathology    Inpatient Modified Barium Swallow Study    Patient Name: Regulo Marques  MRN: [id number]  : 1940  Today's Date: 24  Time Calculation  Start Time: 1100  Stop Time: 1130  Time Calculation (min): 30 min      Modified Barium Swallow Study completed. Informed verbal consent obtained prior to completion of exam. Pt upright in hausted chair, on room air. Trials of thin, nectar/mildly thick liquid, honey/moderately thick liquid, puree, were given.  Of note, this study was complated on 24 and images were initially reviewed in the radiology suite.  However, final review of images and report was delayed by one day d/t lengthy transfer of images from c-arm used for this study.      SLP: Kennedi Hanley CCC-SLP   Contact info: secure chat please    FINAL SPEECH RECOMMENDATIONS    DIET RECOMMENDATIONS:   Initiate an oral diet of PUREE and MILDLY THICK/NECTAR THICK LIQUIDS BY TEASPOON.   No straws.   Total 1:1 feed assist.      *MBSS is a controlled study, pt fully upright in hausted chair, fed at a controlled rate/pace.  Will suggest 1:1 feed with all PO intake to ensure upright positioning and pacing, close monitoring for safety.  Pt does self monitor and will provide feedback when he is eating/drinking.     STRATEGIES:  -Fully awake and engaged for PO intake, hold meal tray if pt is drowsy or change in mentation or medical status.   -Total feed assist at a slow rate, allow extra time between bites/sips.   -Meds crushed in applesauce.   -Present bolus to right side of mouth. Pt has left labial weakness.   -Ok for 2-3 ice chips with RN only.     Education Provided: SLP has provided pt and caregiver/RN Collette with the results and recommendations of this session.  Video images of this study were reviewed with pt so that he might have a better understanding of the oropharyngeal anatomy and swallow function.  Head of bed sign posted in room with diet level recommendations, and safer  "swallow strategies to apply during PO intake, meals and meds.        Repeat study/ dc plan: Yes, in 2-3 weeks or sooner if needed, for diet texture advancement.     Mechanics of the Swallow Summary:  ORAL PHASE:  Lip Closure - Escape progressing to mid-chin  x1 during tsp sips. Left labial weakness.    Tongue Control During Bolus Hold - did not test  Bolus prep/mastication - did not test  Bolus transport/lingual motion - Brisk tongue motion for A-P movement of the bolus   Oral residue - Trace residue lining oral structures     PHARYNGEAL PHASE:  Initiation of pharyngeal swallow - Bolus head at vallecular pit   Soft palate elevation - No bolus between soft palate/pharyngeal wall   Laryngeal elevation - Complete superior movement of thyroid cartilage with contact of arytenoids to epiglottic petiole   Anterior hyoid excursion - Complete anterior movement   Epiglottic movement - Partial inversion  Laryngeal vestibule closure - Incomplete - narrow column of air/contrast in laryngeal vestibule   Pharyngeal stripping wave - Complete  Pharyngeal contraction (A/P view) - Not tested       Pharyngoesophageal segment opening - Partial distension/partial duration with partial obstruction of flow of bolus   Tongue base retraction - Trace column of contrast or air between tongue base and pharyngeal wall   Pharyngeal residue - Collection of residue within or on the pharyngeal structures  noted pyriform residue after straw sips only. Trace valleculae residue with all consistencies.     ESOPHAGEAL PHASE:  Esophageal clearance - Not evaluated     SLP Impressions with Severity Rating:   Mild oral and pharyngeal phases of the swallow, see impairments noted above \"Mechanics of the Swallow\"     -Reduced BOT retraction, reduced epiglottic inversion, and incomplete laryngeal vestibular closure result in trace amount of aspiration with thin liquids.   -There is no significant retained pharyngeal residue after the swallow.    -No aspiration " with thick liquids consistency, both nectar and honey.    -No aspiration seen with puree barium.   -All consistencies were presented by tsp, with the exception of x1 straw sip of nectar, which resulted in the bolus advancing to the pyriforms prior to swallow onset, delay in swallow onset, with eventual swallow, clearing the bolus from the pharynx, and deep laryngeal penetration during the swallow that clears. Therefore, do not use straws.      Strategies attempted- Effortful swallow     Rosenbek's Penetration Aspiration Scale  Thin Liquids: 8. SILENT ASPIRATION - contrast passes glottis, visible residue, NO pt response] , during the swallow, trace/silent, clears.   Nectar Thick Liquids: 2. PENETRATION that CLEARS - contrast enter airway, above vocal cords, no residue  Honey Thick Liquids: 2. PENETRATION that CLEARS - contrast enter airway, above vocal cords, no residue  Puree: 1. NO ASPIRATION & NO PENETRATION - no aspiration, contrast does not enter airway  Soft Solids: did not test for safety.   Solids: did not test for safety.     Plan:  Treatment/Interventions: Pharyngeal exercises, Patient/family education, Bolus trials, Compensatory strategy training, Diet tolerance/advancement  SLP Plan: Skilled SLP warranted  SLP Frequency: 2x per week  Discussed POC: Patient  Discussed Risks/Benefits: Yes  Patient/Caregiver Agreeable: Yes    Short term goals established 24:   Patient will consume prescribed diet without clinical or overt s/s aspiration.    2. Pt/caregiver education.     Reason for Referral: referral for further pharyngeal swallow testing after bedside assessment.   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 Unable to get TNK due to recent GI bleed and to high risk for thrombectomy   Respiratory Status: Room air  Current diet: NPO     Pain:  Pain Scale: 0-10  Ratin during this session.

## 2024-09-06 NOTE — PROGRESS NOTES
Occupational Therapy    OT Treatment    Patient Name: Regulo Marques  MRN: [id number]  Today's Date: 9/6/2024  Time Calculation  Start Time: 1335  Stop Time: 1404  Time Calculation (min): 29 min        Assessment:  End of Session Communication: PCT/NA/CTA  End of Session Patient Position: Bed, 3 rail up, Alarm off, not on at start of session     Plan:  Treatment Interventions: ADL retraining, Functional transfer training, UE strengthening/ROM, Endurance training, Equipment evaluation/education, Neuromuscular reeducation, Fine motor coordination activities, Compensatory technique education  OT Frequency: 5 times per week  OT Discharge Recommendations: High intensity level of continued care  OT - OK to Discharge: Yes (once medically appropriate to next level of care)  Treatment Interventions: ADL retraining, Functional transfer training, UE strengthening/ROM, Endurance training, Equipment evaluation/education, Neuromuscular reeducation, Fine motor coordination activities, Compensatory technique education    Subjective   Previous Visit Info:  OT Last Visit  OT Received On: 09/06/24  General:  General  Co-Treatment: PT  Co-Treatment Reason: maximize safety and functional mobility  Prior to Session Communication: Bedside nurse  Patient Position Received: Bed, 4 rail up, Alarm off, not on at start of session (communicated with nursing about 4 bed rails being up)  General Comment: lethargic at first but in agreement to complete therapy session. impulsive movements at times  Precautions:  Medical Precautions: Fall precautions  Precautions Comment: moctezuma, L sided weakness, aspiration precautions         Objective         Activities of Daily Living: Feeding  Feeding Comments: hand over hand with L UE to hold cup with thickended liquids and with use of R hand able to bring spoonful of liquid up to mouth x 4 trials. pt required max cues to follow speech therapy recommendations, pt completed task while sitting  EOB    Grooming  Grooming Level of Assistance: Setup (to wash face to became more alert at start of session)  Grooming Where Assessed: Bed level  Functional Standing Tolerance:  Time: 2:30 standing at FWW with Max A x2 to safely maintain upright balance d/t increased difficult coming into upright position d/t L sided weakness  Bed Mobility/Transfers: Bed Mobility  Bed Mobility: Yes  Bed Mobility 1  Bed Mobility 1: Supine to sitting  Level of Assistance 1: Contact guard  Bed Mobility 2  Bed Mobility  2: Sitting to supine  Level of Assistance 2: Moderate assistance (x2)    Transfers  Transfer: Yes  Transfer 1  Technique 1: Sit to stand, Stand to sit  Transfer Device 1: Walker  Transfer Level of Assistance 1:  (Min A x 1 and CGA x1 on first trial and Mod A x 2 on second trial d/t impulsively standing not following sequencing for safe stand)  Trials/Comments 1: increased effort noted with pt utilizing L UE this date, bringing L UE up to walker handle and placing on bed. pt required continued assistance to maintain L UE on FWW during tx and standing      Functional Mobility:  Functional Mobility  Functional Mobility Performed: Yes  Functional Mobility 1  Device 1: Rolling walker  Assistance 1: Maximum assistance (x2)  Comments 1: pt able to take a couple side steps to L side, pt requiring Max A x 2 d/t heavily leaning towards L side and unsafely stepping at times  Sitting Balance:  Static Sitting Balance  Static Sitting-Balance Support: Right upper extremity supported (at times attempting to use L UE to assist with balance)  Static Sitting-Level of Assistance:  (SBA to occasional Min A. pt more aware of balance this date and increased attempts made to self correct when coming out of midline.)  Static Sitting-Comment/Number of Minutes: tolerated sitting EOB for 15:00      Outcome Measures:[facility] Daily Activity  Putting on and taking off regular lower body clothing: Total  Bathing (including washing, rinsing, drying): A  lot  Putting on and taking off regular upper body clothing: A lot  Toileting, which includes using toilet, bedpan or urinal: Total  Taking care of personal grooming such as brushing teeth: A little  Eating Meals: A lot  Daily Activity - Total Score: 11        Education Documentation  Body Mechanics, taught by SHAWN Gaming at 9/6/2024  3:03 PM.  Learner: Patient  Readiness: Acceptance  Method: Explanation  Response: Verbalizes Understanding, Needs Reinforcement    ADL Training, taught by SHAWN Gaming at 9/6/2024  3:03 PM.  Learner: Patient  Readiness: Acceptance  Method: Explanation  Response: Verbalizes Understanding, Needs Reinforcement    Education Comments  No comments found.        OP EDUCATION:       Goals:  Encounter Problems       Encounter Problems (Active)       OT Goals       STG- patient will complete LB dressing at MOD A with use of ae/ad/dme prn (Progressing)       Start:  09/03/24    Expected End:  09/17/24            STG- patient will complete toileting at MOD A with use of ae/ad/dme prn (Progressing)       Start:  09/03/24    Expected End:  09/17/24            STG- patient will complete transfers to/from bed, chair, commode at MOD A with use of ae/ad/dme prn (Progressing)       Start:  09/03/24    Expected End:  09/17/24            STG- patient will complete lateral side steps with MOD A with use of ae/ad/dme prn in preparation for ADLs  (Progressing)       Start:  09/03/24    Expected End:  09/17/24            STG- patient will complete grooming with MIN A with use of ae/ad/dme prn (Progressing)       Start:  09/03/24    Expected End:  09/17/24

## 2024-09-06 NOTE — CARE PLAN
The patient's goals for the shift include      The clinical goals for the shift include comfort      Problem: Fall/Injury  Goal: Not fall by end of shift  9/6/2024 0814 by Collette Smith, RN  Outcome: Progressing  9/6/2024 0813 by Collette Smith, RN  Outcome: Progressing  Goal: Be free from injury by end of the shift  9/6/2024 0814 by Collette Smith, RN  Outcome: Progressing  9/6/2024 0813 by Collette Smith, RN  Outcome: Progressing  Goal: Verbalize understanding of personal risk factors for fall in the hospital  9/6/2024 0814 by Collette Smith, RN  Outcome: Progressing  9/6/2024 0813 by Collette Smith, RN  Outcome: Progressing  Goal: Verbalize understanding of risk factor reduction measures to prevent injury from fall in the home  9/6/2024 0814 by Collette Smith, RN  Outcome: Progressing  9/6/2024 0813 by Collette Smith, RN  Outcome: Progressing  Goal: Use assistive devices by end of the shift  9/6/2024 0814 by Collette Smith, RN  Outcome: Progressing  9/6/2024 0813 by Collette Smith, RN  Outcome: Progressing  Goal: Pace activities to prevent fatigue by end of the shift  9/6/2024 0814 by Collette Smith, RN  Outcome: Progressing  9/6/2024 0813 by Collette Smith, RN  Outcome: Progressing

## 2024-09-07 LAB
GLUCOSE BLD MANUAL STRIP-MCNC: 106 MG/DL (ref 74–99)
GLUCOSE BLD MANUAL STRIP-MCNC: 113 MG/DL (ref 74–99)
GLUCOSE BLD MANUAL STRIP-MCNC: 123 MG/DL (ref 74–99)
GLUCOSE BLD MANUAL STRIP-MCNC: 127 MG/DL (ref 74–99)

## 2024-09-07 PROCEDURE — 2500000001 HC RX 250 WO HCPCS SELF ADMINISTERED DRUGS (ALT 637 FOR MEDICARE OP): Performed by: INTERNAL MEDICINE

## 2024-09-07 PROCEDURE — 82947 ASSAY GLUCOSE BLOOD QUANT: CPT

## 2024-09-07 PROCEDURE — 2060000001 HC INTERMEDIATE ICU ROOM DAILY

## 2024-09-07 PROCEDURE — 2500000004 HC RX 250 GENERAL PHARMACY W/ HCPCS (ALT 636 FOR OP/ED): Performed by: NURSE PRACTITIONER

## 2024-09-07 PROCEDURE — 2500000001 HC RX 250 WO HCPCS SELF ADMINISTERED DRUGS (ALT 637 FOR MEDICARE OP): Performed by: PSYCHIATRY & NEUROLOGY

## 2024-09-07 PROCEDURE — 2500000002 HC RX 250 W HCPCS SELF ADMINISTERED DRUGS (ALT 637 FOR MEDICARE OP, ALT 636 FOR OP/ED): Performed by: PHYSICIAN ASSISTANT

## 2024-09-07 PROCEDURE — 2500000004 HC RX 250 GENERAL PHARMACY W/ HCPCS (ALT 636 FOR OP/ED)

## 2024-09-07 RX ORDER — MAGNESIUM HYDROXIDE 2400 MG/10ML
10 SUSPENSION ORAL ONCE
Status: COMPLETED | OUTPATIENT
Start: 2024-09-07 | End: 2024-09-07

## 2024-09-07 RX ORDER — ADHESIVE BANDAGE
30 BANDAGE TOPICAL ONCE
Status: DISCONTINUED | OUTPATIENT
Start: 2024-09-07 | End: 2024-09-07

## 2024-09-07 ASSESSMENT — COGNITIVE AND FUNCTIONAL STATUS - GENERAL
DRESSING REGULAR LOWER BODY CLOTHING: A LOT
MOBILITY SCORE: 10
CLIMB 3 TO 5 STEPS WITH RAILING: TOTAL
TURNING FROM BACK TO SIDE WHILE IN FLAT BAD: A LITTLE
DRESSING REGULAR UPPER BODY CLOTHING: A LOT
TURNING FROM BACK TO SIDE WHILE IN FLAT BAD: A LITTLE
STANDING UP FROM CHAIR USING ARMS: TOTAL
STANDING UP FROM CHAIR USING ARMS: TOTAL
EATING MEALS: A LOT
TOILETING: TOTAL
MOVING FROM LYING ON BACK TO SITTING ON SIDE OF FLAT BED WITH BEDRAILS: A LITTLE
DAILY ACTIVITIY SCORE: 11
DRESSING REGULAR LOWER BODY CLOTHING: A LOT
CLIMB 3 TO 5 STEPS WITH RAILING: TOTAL
MOBILITY SCORE: 10
WALKING IN HOSPITAL ROOM: TOTAL
DRESSING REGULAR UPPER BODY CLOTHING: A LOT
PERSONAL GROOMING: A LOT
WALKING IN HOSPITAL ROOM: TOTAL
PERSONAL GROOMING: A LOT
HELP NEEDED FOR BATHING: A LOT
MOVING FROM LYING ON BACK TO SITTING ON SIDE OF FLAT BED WITH BEDRAILS: A LITTLE
TOILETING: TOTAL
MOVING TO AND FROM BED TO CHAIR: TOTAL
MOVING TO AND FROM BED TO CHAIR: TOTAL
HELP NEEDED FOR BATHING: A LOT
EATING MEALS: A LOT
DAILY ACTIVITIY SCORE: 11

## 2024-09-07 ASSESSMENT — PAIN - FUNCTIONAL ASSESSMENT
PAIN_FUNCTIONAL_ASSESSMENT: 0-10
PAIN_FUNCTIONAL_ASSESSMENT: 0-10

## 2024-09-07 ASSESSMENT — PAIN SCALES - GENERAL
PAINLEVEL_OUTOF10: 0 - NO PAIN
PAINLEVEL_OUTOF10: 3
PAINLEVEL_OUTOF10: 0 - NO PAIN
PAINLEVEL_OUTOF10: 0 - NO PAIN

## 2024-09-07 ASSESSMENT — PAIN DESCRIPTION - LOCATION: LOCATION: HEAD

## 2024-09-07 NOTE — PROGRESS NOTES
Seen/exam/meds/labs/notes reviewed  Pt still here  ?now going to acute rehab??  Resting comfortably  Afvss  Exam no changes  No new labs  A/p; still stable for dc; anticoag decision per cards/neuro          Assessment & Plan  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

## 2024-09-07 NOTE — PROGRESS NOTES
Dispo planning for [facility]. Awaiting precert. TCC to continue to follow for discharge planning.     JEFFREY SIERRA, JOSE TCC

## 2024-09-07 NOTE — PROGRESS NOTES
Seen/exam/meds/labs/notes reviewed  He's still here  Resting comfortably  Afvss  No new labs  Exam no sig changes  A/p: s/p stroke, et al  He's been medically ready for dc for days now    Assessment & Plan  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

## 2024-09-08 ENCOUNTER — HOSPITAL ENCOUNTER (INPATIENT)
Facility: HOSPITAL | Age: 84
DRG: 057 | End: 2024-09-08
Attending: PHYSICAL MEDICINE & REHABILITATION | Admitting: PHYSICAL MEDICINE & REHABILITATION
Payer: MEDICARE

## 2024-09-08 VITALS
WEIGHT: 205.47 LBS | HEART RATE: 45 BPM | TEMPERATURE: 97.9 F | RESPIRATION RATE: 18 BRPM | SYSTOLIC BLOOD PRESSURE: 124 MMHG | OXYGEN SATURATION: 96 % | BODY MASS INDEX: 30.43 KG/M2 | DIASTOLIC BLOOD PRESSURE: 62 MMHG | HEIGHT: 69 IN

## 2024-09-08 DIAGNOSIS — I61.9 CVA (CEREBROVASCULAR ACCIDENT DUE TO INTRACEREBRAL HEMORRHAGE) (MULTI): Primary | ICD-10-CM

## 2024-09-08 PROBLEM — D64.9 ANEMIA: Status: ACTIVE | Noted: 2024-08-12

## 2024-09-08 PROBLEM — I50.32 CHRONIC DIASTOLIC HEART FAILURE (MULTI): Status: ACTIVE | Noted: 2024-09-08

## 2024-09-08 PROBLEM — E66.9 OBESITY (BMI 30-39.9): Status: ACTIVE | Noted: 2024-09-08

## 2024-09-08 PROBLEM — Z85.038 HISTORY OF MALIGNANT NEOPLASM OF COLON: Status: ACTIVE | Noted: 2024-09-08

## 2024-09-08 PROBLEM — K57.92 DIVERTICULITIS: Status: ACTIVE | Noted: 2024-09-08

## 2024-09-08 PROBLEM — E11.9 DIABETES MELLITUS (MULTI): Status: ACTIVE | Noted: 2024-09-08

## 2024-09-08 PROBLEM — K59.09 CHRONIC CONSTIPATION: Status: ACTIVE | Noted: 2024-09-08

## 2024-09-08 PROBLEM — I48.11 LONGSTANDING PERSISTENT ATRIAL FIBRILLATION (MULTI): Status: ACTIVE | Noted: 2024-09-08

## 2024-09-08 PROBLEM — I10 ESSENTIAL HYPERTENSION, BENIGN: Status: ACTIVE | Noted: 2024-09-08

## 2024-09-08 PROBLEM — R31.0 GROSS HEMATURIA: Status: ACTIVE | Noted: 2024-09-08

## 2024-09-08 LAB
GLUCOSE BLD MANUAL STRIP-MCNC: 103 MG/DL (ref 74–99)
GLUCOSE BLD MANUAL STRIP-MCNC: 117 MG/DL (ref 74–99)
GLUCOSE BLD MANUAL STRIP-MCNC: 131 MG/DL (ref 74–99)

## 2024-09-08 PROCEDURE — 82947 ASSAY GLUCOSE BLOOD QUANT: CPT

## 2024-09-08 PROCEDURE — 2500000001 HC RX 250 WO HCPCS SELF ADMINISTERED DRUGS (ALT 637 FOR MEDICARE OP): Performed by: PSYCHIATRY & NEUROLOGY

## 2024-09-08 PROCEDURE — 2500000004 HC RX 250 GENERAL PHARMACY W/ HCPCS (ALT 636 FOR OP/ED): Performed by: NURSE PRACTITIONER

## 2024-09-08 PROCEDURE — 2500000001 HC RX 250 WO HCPCS SELF ADMINISTERED DRUGS (ALT 637 FOR MEDICARE OP): Performed by: PHYSICAL MEDICINE & REHABILITATION

## 2024-09-08 PROCEDURE — 2500000002 HC RX 250 W HCPCS SELF ADMINISTERED DRUGS (ALT 637 FOR MEDICARE OP, ALT 636 FOR OP/ED): Performed by: PHYSICIAN ASSISTANT

## 2024-09-08 PROCEDURE — 1180000001 HC REHAB PRIVATE ROOM DAILY

## 2024-09-08 PROCEDURE — 2500000002 HC RX 250 W HCPCS SELF ADMINISTERED DRUGS (ALT 637 FOR MEDICARE OP, ALT 636 FOR OP/ED): Performed by: PHYSICAL MEDICINE & REHABILITATION

## 2024-09-08 PROCEDURE — 97530 THERAPEUTIC ACTIVITIES: CPT | Mod: GP,CQ

## 2024-09-08 RX ORDER — ALUMINUM HYDROXIDE, MAGNESIUM HYDROXIDE, AND SIMETHICONE 1200; 120; 1200 MG/30ML; MG/30ML; MG/30ML
30 SUSPENSION ORAL EVERY 6 HOURS PRN
Status: DISCONTINUED | OUTPATIENT
Start: 2024-09-08 | End: 2024-09-16 | Stop reason: HOSPADM

## 2024-09-08 RX ORDER — TALC
3 POWDER (GRAM) TOPICAL NIGHTLY PRN
Status: DISCONTINUED | OUTPATIENT
Start: 2024-09-08 | End: 2024-09-16 | Stop reason: HOSPADM

## 2024-09-08 RX ORDER — BISACODYL 10 MG/1
10 SUPPOSITORY RECTAL DAILY PRN
Status: DISCONTINUED | OUTPATIENT
Start: 2024-09-08 | End: 2024-09-16 | Stop reason: HOSPADM

## 2024-09-08 RX ORDER — ACETAMINOPHEN 160 MG/5ML
650 SOLUTION ORAL EVERY 4 HOURS PRN
Status: DISCONTINUED | OUTPATIENT
Start: 2024-09-08 | End: 2024-09-16 | Stop reason: HOSPADM

## 2024-09-08 RX ORDER — INSULIN LISPRO 100 [IU]/ML
0-5 INJECTION, SOLUTION INTRAVENOUS; SUBCUTANEOUS
Status: DISCONTINUED | OUTPATIENT
Start: 2024-09-08 | End: 2024-09-16 | Stop reason: HOSPADM

## 2024-09-08 RX ORDER — SENNOSIDES 8.6 MG/1
1 TABLET ORAL NIGHTLY
Status: DISCONTINUED | OUTPATIENT
Start: 2024-09-08 | End: 2024-09-16 | Stop reason: HOSPADM

## 2024-09-08 RX ORDER — ATORVASTATIN CALCIUM 80 MG/1
80 TABLET, FILM COATED ORAL NIGHTLY
Status: DISCONTINUED | OUTPATIENT
Start: 2024-09-08 | End: 2024-09-16 | Stop reason: HOSPADM

## 2024-09-08 RX ORDER — ACETAMINOPHEN 325 MG/1
650 TABLET ORAL EVERY 6 HOURS PRN
Status: DISCONTINUED | OUTPATIENT
Start: 2024-09-08 | End: 2024-09-16 | Stop reason: HOSPADM

## 2024-09-08 RX ORDER — TAMSULOSIN HYDROCHLORIDE 0.4 MG/1
0.4 CAPSULE ORAL NIGHTLY
Status: DISCONTINUED | OUTPATIENT
Start: 2024-09-08 | End: 2024-09-09

## 2024-09-08 RX ORDER — BISACODYL 5 MG
10 TABLET, DELAYED RELEASE (ENTERIC COATED) ORAL DAILY PRN
Status: DISCONTINUED | OUTPATIENT
Start: 2024-09-08 | End: 2024-09-16 | Stop reason: HOSPADM

## 2024-09-08 SDOH — SOCIAL STABILITY: SOCIAL INSECURITY
WITHIN THE LAST YEAR, HAVE YOU BEEN KICKED, HIT, SLAPPED, OR OTHERWISE PHYSICALLY HURT BY YOUR PARTNER OR EX-PARTNER?: NO

## 2024-09-08 SDOH — SOCIAL STABILITY: SOCIAL NETWORK
DO YOU BELONG TO ANY CLUBS OR ORGANIZATIONS SUCH AS CHURCH GROUPS UNIONS, FRATERNAL OR ATHLETIC GROUPS, OR SCHOOL GROUPS?: NO

## 2024-09-08 SDOH — SOCIAL STABILITY: SOCIAL INSECURITY
WITHIN THE LAST YEAR, HAVE TO BEEN RAPED OR FORCED TO HAVE ANY KIND OF SEXUAL ACTIVITY BY YOUR PARTNER OR EX-PARTNER?: NO

## 2024-09-08 SDOH — ECONOMIC STABILITY: TRANSPORTATION INSECURITY
IN THE PAST 12 MONTHS, HAS THE LACK OF TRANSPORTATION KEPT YOU FROM MEDICAL APPOINTMENTS OR FROM GETTING MEDICATIONS?: NO

## 2024-09-08 SDOH — SOCIAL STABILITY: SOCIAL NETWORK: HOW OFTEN DO YOU ATTENT MEETINGS OF THE CLUB OR ORGANIZATION YOU BELONG TO?: 1 TO 4 TIMES PER YEAR

## 2024-09-08 SDOH — HEALTH STABILITY: MENTAL HEALTH: HOW OFTEN DO YOU HAVE A DRINK CONTAINING ALCOHOL?: NEVER

## 2024-09-08 SDOH — SOCIAL STABILITY: SOCIAL NETWORK: HOW OFTEN DO YOU ATTEND CHURCH OR RELIGIOUS SERVICES?: 1 TO 4 TIMES PER YEAR

## 2024-09-08 SDOH — SOCIAL STABILITY: SOCIAL INSECURITY: DO YOU FEEL ANYONE HAS EXPLOITED OR TAKEN ADVANTAGE OF YOU FINANCIALLY OR OF YOUR PERSONAL PROPERTY?: NO

## 2024-09-08 SDOH — HEALTH STABILITY: MENTAL HEALTH: HOW OFTEN DO YOU HAVE 6 OR MORE DRINKS ON ONE OCCASION?: NEVER

## 2024-09-08 SDOH — ECONOMIC STABILITY: HOUSING INSECURITY: AT ANY TIME IN THE PAST 12 MONTHS, WERE YOU HOMELESS OR LIVING IN A SHELTER (INCLUDING NOW)?: NO

## 2024-09-08 SDOH — ECONOMIC STABILITY: INCOME INSECURITY: IN THE PAST 12 MONTHS, HAS THE ELECTRIC, GAS, OIL, OR WATER COMPANY THREATENED TO SHUT OFF SERVICE IN YOUR HOME?: NO

## 2024-09-08 SDOH — SOCIAL STABILITY: SOCIAL INSECURITY: WITHIN THE LAST YEAR, HAVE YOU BEEN AFRAID OF YOUR PARTNER OR EX-PARTNER?: NO

## 2024-09-08 SDOH — HEALTH STABILITY: MENTAL HEALTH
HOW OFTEN DO YOU NEED TO HAVE SOMEONE HELP YOU WHEN YOU READ INSTRUCTIONS, PAMPHLETS, OR OTHER WRITTEN MATERIAL FROM YOUR DOCTOR OR PHARMACY?: RARELY

## 2024-09-08 SDOH — SOCIAL STABILITY: SOCIAL NETWORK: HOW OFTEN DO YOU GET TOGETHER WITH FRIENDS OR RELATIVES?: NEVER

## 2024-09-08 SDOH — SOCIAL STABILITY: SOCIAL INSECURITY: ARE THERE ANY APPARENT SIGNS OF INJURIES/BEHAVIORS THAT COULD BE RELATED TO ABUSE/NEGLECT?: NO

## 2024-09-08 SDOH — HEALTH STABILITY: PHYSICAL HEALTH: ON AVERAGE, HOW MANY MINUTES DO YOU ENGAGE IN EXERCISE AT THIS LEVEL?: 0 MIN

## 2024-09-08 SDOH — HEALTH STABILITY: MENTAL HEALTH
STRESS IS WHEN SOMEONE FEELS TENSE, NERVOUS, ANXIOUS, OR CAN'T SLEEP AT NIGHT BECAUSE THEIR MIND IS TROUBLED. HOW STRESSED ARE YOU?: TO SOME EXTENT

## 2024-09-08 SDOH — SOCIAL STABILITY: SOCIAL INSECURITY: WITHIN THE LAST YEAR, HAVE YOU BEEN HUMILIATED OR EMOTIONALLY ABUSED IN OTHER WAYS BY YOUR PARTNER OR EX-PARTNER?: NO

## 2024-09-08 SDOH — HEALTH STABILITY: MENTAL HEALTH: HOW MANY STANDARD DRINKS CONTAINING ALCOHOL DO YOU HAVE ON A TYPICAL DAY?: PATIENT DOES NOT DRINK

## 2024-09-08 SDOH — SOCIAL STABILITY: SOCIAL NETWORK: ARE YOU MARRIED, WIDOWED, DIVORCED, SEPARATED, NEVER MARRIED, OR LIVING WITH A PARTNER?: MARRIED

## 2024-09-08 SDOH — ECONOMIC STABILITY: FOOD INSECURITY: WITHIN THE PAST 12 MONTHS, YOU WORRIED THAT YOUR FOOD WOULD RUN OUT BEFORE YOU GOT MONEY TO BUY MORE.: NEVER TRUE

## 2024-09-08 SDOH — ECONOMIC STABILITY: TRANSPORTATION INSECURITY
IN THE PAST 12 MONTHS, HAS LACK OF TRANSPORTATION KEPT YOU FROM MEETINGS, WORK, OR FROM GETTING THINGS NEEDED FOR DAILY LIVING?: NO

## 2024-09-08 SDOH — ECONOMIC STABILITY: FOOD INSECURITY: WITHIN THE PAST 12 MONTHS, THE FOOD YOU BOUGHT JUST DIDN'T LAST AND YOU DIDN'T HAVE MONEY TO GET MORE.: NEVER TRUE

## 2024-09-08 SDOH — SOCIAL STABILITY: SOCIAL INSECURITY: HAS ANYONE EVER THREATENED TO HURT YOUR FAMILY OR YOUR PETS?: NO

## 2024-09-08 SDOH — HEALTH STABILITY: PHYSICAL HEALTH: ON AVERAGE, HOW MANY DAYS PER WEEK DO YOU ENGAGE IN MODERATE TO STRENUOUS EXERCISE (LIKE A BRISK WALK)?: 0 DAYS

## 2024-09-08 SDOH — ECONOMIC STABILITY: INCOME INSECURITY: HOW HARD IS IT FOR YOU TO PAY FOR THE VERY BASICS LIKE FOOD, HOUSING, MEDICAL CARE, AND HEATING?: NOT VERY HARD

## 2024-09-08 SDOH — ECONOMIC STABILITY: INCOME INSECURITY: IN THE LAST 12 MONTHS, WAS THERE A TIME WHEN YOU WERE NOT ABLE TO PAY THE MORTGAGE OR RENT ON TIME?: NO

## 2024-09-08 SDOH — SOCIAL STABILITY: SOCIAL INSECURITY: ABUSE: ADULT

## 2024-09-08 SDOH — SOCIAL STABILITY: SOCIAL INSECURITY: DO YOU FEEL UNSAFE GOING BACK TO THE PLACE WHERE YOU ARE LIVING?: NO

## 2024-09-08 SDOH — SOCIAL STABILITY: SOCIAL NETWORK: IN A TYPICAL WEEK, HOW MANY TIMES DO YOU TALK ON THE PHONE WITH FAMILY, FRIENDS, OR NEIGHBORS?: NEVER

## 2024-09-08 SDOH — SOCIAL STABILITY: SOCIAL INSECURITY: WERE YOU ABLE TO COMPLETE ALL THE BEHAVIORAL HEALTH SCREENINGS?: YES

## 2024-09-08 SDOH — ECONOMIC STABILITY: HOUSING INSECURITY: IN THE PAST 12 MONTHS, HOW MANY TIMES HAVE YOU MOVED WHERE YOU WERE LIVING?: 0

## 2024-09-08 SDOH — SOCIAL STABILITY: SOCIAL INSECURITY: HAVE YOU HAD ANY THOUGHTS OF HARMING ANYONE ELSE?: NO

## 2024-09-08 SDOH — SOCIAL STABILITY: SOCIAL INSECURITY: ARE YOU OR HAVE YOU BEEN THREATENED OR ABUSED PHYSICALLY, EMOTIONALLY, OR SEXUALLY BY ANYONE?: NO

## 2024-09-08 SDOH — SOCIAL STABILITY: SOCIAL INSECURITY: DOES ANYONE TRY TO KEEP YOU FROM HAVING/CONTACTING OTHER FRIENDS OR DOING THINGS OUTSIDE YOUR HOME?: NO

## 2024-09-08 SDOH — SOCIAL STABILITY: SOCIAL INSECURITY: HAVE YOU HAD THOUGHTS OF HARMING ANYONE ELSE?: NO

## 2024-09-08 ASSESSMENT — LIFESTYLE VARIABLES
SKIP TO QUESTIONS 9-10: 1
SUBSTANCE_ABUSE_PAST_12_MONTHS: NO
HOW OFTEN DO YOU HAVE 6 OR MORE DRINKS ON ONE OCCASION: NEVER
AUDIT-C TOTAL SCORE: 0
AUDIT-C TOTAL SCORE: 0
PRESCIPTION_ABUSE_PAST_12_MONTHS: NO
AUDIT-C TOTAL SCORE: 0
AUDIT-C TOTAL SCORE: 0
HOW OFTEN DO YOU HAVE A DRINK CONTAINING ALCOHOL: NEVER
HOW MANY STANDARD DRINKS CONTAINING ALCOHOL DO YOU HAVE ON A TYPICAL DAY: PATIENT DOES NOT DRINK
SKIP TO QUESTIONS 9-10: 1
SKIP TO QUESTIONS 9-10: 1

## 2024-09-08 ASSESSMENT — COGNITIVE AND FUNCTIONAL STATUS - GENERAL
HELP NEEDED FOR BATHING: A LOT
EATING MEALS: A LOT
DAILY ACTIVITIY SCORE: 11
MOVING FROM LYING ON BACK TO SITTING ON SIDE OF FLAT BED WITH BEDRAILS: A LITTLE
WALKING IN HOSPITAL ROOM: TOTAL
CLIMB 3 TO 5 STEPS WITH RAILING: TOTAL
TOILETING: TOTAL
MOBILITY SCORE: 10
TURNING FROM BACK TO SIDE WHILE IN FLAT BAD: A LITTLE
WALKING IN HOSPITAL ROOM: TOTAL
STANDING UP FROM CHAIR USING ARMS: TOTAL
PERSONAL GROOMING: A LOT
DRESSING REGULAR UPPER BODY CLOTHING: A LOT
MOVING FROM LYING ON BACK TO SITTING ON SIDE OF FLAT BED WITH BEDRAILS: A LITTLE
MOVING TO AND FROM BED TO CHAIR: TOTAL
DRESSING REGULAR LOWER BODY CLOTHING: A LOT
CLIMB 3 TO 5 STEPS WITH RAILING: TOTAL
TURNING FROM BACK TO SIDE WHILE IN FLAT BAD: A LITTLE
MOBILITY SCORE: 11
MOVING TO AND FROM BED TO CHAIR: TOTAL
STANDING UP FROM CHAIR USING ARMS: A LOT

## 2024-09-08 ASSESSMENT — PAIN SCALES - GENERAL
PAINLEVEL_OUTOF10: 0 - NO PAIN
PAINLEVEL_OUTOF10: 6
PAINLEVEL_OUTOF10: 0 - NO PAIN

## 2024-09-08 ASSESSMENT — PAIN - FUNCTIONAL ASSESSMENT
PAIN_FUNCTIONAL_ASSESSMENT: 0-10

## 2024-09-08 ASSESSMENT — COLUMBIA-SUICIDE SEVERITY RATING SCALE - C-SSRS
6. HAVE YOU EVER DONE ANYTHING, STARTED TO DO ANYTHING, OR PREPARED TO DO ANYTHING TO END YOUR LIFE?: NO
2. HAVE YOU ACTUALLY HAD ANY THOUGHTS OF KILLING YOURSELF?: NO
1. IN THE PAST MONTH, HAVE YOU WISHED YOU WERE DEAD OR WISHED YOU COULD GO TO SLEEP AND NOT WAKE UP?: NO

## 2024-09-08 ASSESSMENT — PAIN SCALES - WONG BAKER
WONGBAKER_NUMERICALRESPONSE: NO HURT
WONGBAKER_NUMERICALRESPONSE: HURTS EVEN MORE

## 2024-09-08 ASSESSMENT — BRIEF INTERVIEW FOR MENTAL STATUS (BIMS)
GENERAL MEMORY AND RECALL ABILITY: RECOGNIZES APPROPRIATE HEALTHCARE SETTING
COGNITIVE PATTERN ASSESSMENT USED: STAFF ASSESSMENT
GENERAL FUNCTIONAL COGNITION RATING: INDEPENDENT

## 2024-09-08 ASSESSMENT — ACTIVITIES OF DAILY LIVING (ADL)
ASSISTIVE_DEVICE: EYEGLASSES;DENTURES PARTIAL;WALKER
BATHING: INDEPENDENT
DRESSING: INDEPENDENT
TOILETING: INDEPENDENT

## 2024-09-08 ASSESSMENT — PAIN DESCRIPTION - LOCATION: LOCATION: HEAD

## 2024-09-08 NOTE — PROGRESS NOTES
9/8/2024  Per [facility]- we have auth for pt for Acute Rehab.    Called acute rehab and they will call me back.   RN notified via secure chat.   Emmy MEJIAS    9/8/2024 1106  Met with pt in room and notified him.  Spoke with RN- she is aware and will call report.   Called and informed wife.    Emmy Morillo Rn TCC

## 2024-09-08 NOTE — PROGRESS NOTES
"Physical Therapy    Physical Therapy Treatment    Patient Name: Regulo Marques  MRN: [id number]  Today's Date: 9/8/2024  Time Calculation  Start Time: 0927  Stop Time: 1000  Time Calculation (min): 33 min         Assessment/Plan   PT Assessment  End of Session Communication: PCT/NA/CTA  End of Session Patient Position: Bed, 3 rail up, Alarm on (call light and needs within reach.)     PT Plan  Treatment/Interventions: Bed mobility, Transfer training, Gait training  PT Plan: Ongoing PT  PT Frequency: 5 times per week  PT Discharge Recommendations: High intensity level of continued care  PT - OK to Discharge: Yes      General Visit Information:   PT  Visit  PT Received On: 09/08/24  General  Prior to Session Communication: Bedside nurse  Patient Position Received: Bed, 3 rail up, Alarm on  General Comment: Patient pleasant and agreeable to therapy. Patient impulsive at times. denies pain however, reports \"upset stomach.\" Patient requesting ginger-cathi, RN ok'd. Patient given thickened (nectar) ginger-cathi    Subjective   Precautions:  Precautions  Hearing/Visual Limitations: [place]  Medical Precautions: Fall precautions  Precautions Comment: moctezuma, L sided weakness, aspiration precautions    Objective   Pain:  Pain Assessment  Pain Assessment: 0-10  0-10 (Numeric) Pain Score: 0 - No pain  Cognition:  Cognition  Orientation Level: Oriented X4  Treatments:  Therapeutic Activity  Therapeutic Activity Performed: Yes  Therapeutic Activity 1: Patient tolerated static sitting EOB with Min/ModA x1 d/t impulsivity and balance. Tolerated ~10mins sitting.    Bed Mobility  Bed Mobility: Yes  Bed Mobility 1  Bed Mobility 1: Supine to sitting  Level of Assistance 1: Minimum assistance  Bed Mobility Comments 1: Assist at trunk to orient to midline.  Bed Mobility 2  Bed Mobility  2: Sitting to supine  Level of Assistance 2: Moderate assistance (x2)  Bed Mobility Comments 2: Assist with control and B LE.    Transfers  Transfer: Yes  Transfer " 1  Trials/Comments 1: Patient performed sit<>Stand from EOB with ModA x1. Cues for hand placement and sequencing. Required assist to maintain L UE on FWW. Tolerated x2 stands with ModA x1, </=1min    Outcome Measures:  [facility] Basic Mobility  Turning from your back to your side while in a flat bed without using bedrails: A little  Moving from lying on your back to sitting on the side of a flat bed without using bedrails: A little  Moving to and from bed to chair (including a wheelchair): Total  Standing up from a chair using your arms (e.g. wheelchair or bedside chair): A lot  To walk in hospital room: Total  Climbing 3-5 steps with railing: Total  Basic Mobility - Total Score: 11    Education Documentation  Precautions, taught by Tyra Jaramillo PTA at 9/8/2024 12:57 PM.  Learner: Patient  Readiness: Acceptance  Method: Explanation  Response: Verbalizes Understanding  Comment: Educated patient on proper hand placement to facilitate safe sit<>stand transfer.    Mobility Training, taught by Tyra Jaramillo PTA at 9/8/2024 12:57 PM.  Learner: Patient  Readiness: Acceptance  Method: Explanation  Response: Verbalizes Understanding  Comment: Educated patient on proper hand placement to facilitate safe sit<>stand transfer.    Education Comments  No comments found.        OP EDUCATION:       Encounter Problems       Encounter Problems (Active)       PT Problem       STG - Pt will transition supine <> sitting with mod A x 1  (Progressing)       Start:  09/03/24    Expected End:  09/17/24            STG - Pt will transfer STS with mod A x 1  (Progressing)       Start:  09/03/24    Expected End:  09/17/24            STG - Pt will amb 15' using RW with min-mod A x 2  (Progressing)       Start:  09/03/24    Expected End:  09/17/24               Pain - Adult

## 2024-09-08 NOTE — CARE PLAN
The patient's goals for the shift include  physical therapy.    The clinical goals for the shift include remain HDS

## 2024-09-08 NOTE — CARE PLAN
The patient's goals for the shift include  comfort    The clinical goals for the shift include To remain HD stable

## 2024-09-08 NOTE — PROGRESS NOTES
Seen/exam/meds/labs/notes reviewed  Still here  No new issues  L boubacar/speech seem better  Afvss  No new labs  A/p: still waiting on dc to rehab      Assessment & Plan  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

## 2024-09-08 NOTE — CARE PLAN
The patient's goals for the shift include      The clinical goals for the shift include comfort and rest      Problem: Fall/Injury  Goal: Not fall by end of shift  Outcome: Progressing  Goal: Be free from injury by end of the shift  Outcome: Progressing  Goal: Verbalize understanding of personal risk factors for fall in the hospital  Outcome: Progressing  Goal: Verbalize understanding of risk factor reduction measures to prevent injury from fall in the home  Outcome: Progressing  Goal: Use assistive devices by end of the shift  Outcome: Progressing  Goal: Pace activities to prevent fatigue by end of the shift  Outcome: Progressing     Problem: Skin  Goal: Participates in plan/prevention/treatment measures  Outcome: Progressing  Flowsheets (Taken 9/8/2024 0302)  Participates in plan/prevention/treatment measures: Elevate heels  Goal: Prevent/manage excess moisture  Outcome: Progressing  Goal: Prevent/minimize sheer/friction injuries  Outcome: Progressing  Goal: Promote/optimize nutrition  Outcome: Progressing  Goal: Promote skin healing  Outcome: Progressing     Problem: General Stroke  Goal: Establish a mutual long term goal with patient by discharge  Outcome: Progressing  Goal: Demonstrate improvement in neurological exam throughout the shift  Outcome: Progressing  Goal: Maintain BP within ordered limits throughout shift  Outcome: Progressing  Goal: Participate in treatment (ie., meds, therapy) throughout shift  Outcome: Progressing  Goal: No symptoms of aspiration throughout shift  Outcome: Progressing  Goal: No symptoms of hemorrhage throughout shift  Outcome: Progressing  Goal: Tolerate enteral feeding throughout shift  Outcome: Progressing  Goal: Decreased nausea/vomiting throughout shift  Outcome: Progressing  Goal: Controlled blood glucose throughout shift  Outcome: Progressing  Goal: Out of bed three times today  Outcome: Progressing       Over the shift, the patient did not make progress toward the  following goals. Barriers to progression include

## 2024-09-09 PROBLEM — R33.9 URINARY RETENTION: Status: ACTIVE | Noted: 2024-09-09

## 2024-09-09 PROBLEM — I48.91 ATRIAL FIBRILLATION (MULTI): Status: ACTIVE | Noted: 2024-09-09

## 2024-09-09 LAB
ANION GAP SERPL CALC-SCNC: 10 MMOL/L (ref 10–20)
BUN SERPL-MCNC: 32 MG/DL (ref 6–23)
CALCIUM SERPL-MCNC: 9.2 MG/DL (ref 8.6–10.3)
CHLORIDE SERPL-SCNC: 115 MMOL/L (ref 98–107)
CO2 SERPL-SCNC: 24 MMOL/L (ref 21–32)
CREAT SERPL-MCNC: 1.51 MG/DL (ref 0.5–1.3)
EGFRCR SERPLBLD CKD-EPI 2021: 45 ML/MIN/1.73M*2
ERYTHROCYTE [DISTWIDTH] IN BLOOD BY AUTOMATED COUNT: 15.9 % (ref 11.5–14.5)
GLUCOSE BLD MANUAL STRIP-MCNC: 133 MG/DL (ref 74–99)
GLUCOSE BLD MANUAL STRIP-MCNC: 133 MG/DL (ref 74–99)
GLUCOSE BLD MANUAL STRIP-MCNC: 96 MG/DL (ref 74–99)
GLUCOSE SERPL-MCNC: 106 MG/DL (ref 74–99)
HCT VFR BLD AUTO: 27.8 % (ref 41–52)
HGB BLD-MCNC: 8.4 G/DL (ref 13.5–17.5)
MCH RBC QN AUTO: 31.3 PG (ref 26–34)
MCHC RBC AUTO-ENTMCNC: 30.2 G/DL (ref 32–36)
MCV RBC AUTO: 104 FL (ref 80–100)
NRBC BLD-RTO: 0 /100 WBCS (ref 0–0)
PLATELET # BLD AUTO: 184 X10*3/UL (ref 150–450)
POTASSIUM SERPL-SCNC: 4.1 MMOL/L (ref 3.5–5.3)
RBC # BLD AUTO: 2.68 X10*6/UL (ref 4.5–5.9)
SODIUM SERPL-SCNC: 145 MMOL/L (ref 136–145)
WBC # BLD AUTO: 6.9 X10*3/UL (ref 4.4–11.3)

## 2024-09-09 PROCEDURE — 97163 PT EVAL HIGH COMPLEX 45 MIN: CPT | Mod: GP

## 2024-09-09 PROCEDURE — 2500000001 HC RX 250 WO HCPCS SELF ADMINISTERED DRUGS (ALT 637 FOR MEDICARE OP): Performed by: PHYSICAL MEDICINE & REHABILITATION

## 2024-09-09 PROCEDURE — 82947 ASSAY GLUCOSE BLOOD QUANT: CPT

## 2024-09-09 PROCEDURE — 2500000002 HC RX 250 W HCPCS SELF ADMINISTERED DRUGS (ALT 637 FOR MEDICARE OP, ALT 636 FOR OP/ED): Performed by: PHYSICAL MEDICINE & REHABILITATION

## 2024-09-09 PROCEDURE — 82374 ASSAY BLOOD CARBON DIOXIDE: CPT | Performed by: PHYSICAL MEDICINE & REHABILITATION

## 2024-09-09 PROCEDURE — 97116 GAIT TRAINING THERAPY: CPT | Mod: GP

## 2024-09-09 PROCEDURE — 85027 COMPLETE CBC AUTOMATED: CPT | Performed by: PHYSICAL MEDICINE & REHABILITATION

## 2024-09-09 PROCEDURE — 97535 SELF CARE MNGMENT TRAINING: CPT | Mod: GO,CO

## 2024-09-09 PROCEDURE — 36415 COLL VENOUS BLD VENIPUNCTURE: CPT | Performed by: PHYSICAL MEDICINE & REHABILITATION

## 2024-09-09 PROCEDURE — 99222 1ST HOSP IP/OBS MODERATE 55: CPT | Performed by: PHYSICAL MEDICINE & REHABILITATION

## 2024-09-09 PROCEDURE — 92610 EVALUATE SWALLOWING FUNCTION: CPT | Mod: GN | Performed by: IN HOME SUPPORTIVE CARE

## 2024-09-09 PROCEDURE — 97110 THERAPEUTIC EXERCISES: CPT | Mod: GP

## 2024-09-09 PROCEDURE — 97167 OT EVAL HIGH COMPLEX 60 MIN: CPT | Mod: GO

## 2024-09-09 PROCEDURE — 1180000001 HC REHAB PRIVATE ROOM DAILY

## 2024-09-09 PROCEDURE — 97530 THERAPEUTIC ACTIVITIES: CPT | Mod: GP

## 2024-09-09 PROCEDURE — 92523 SPEECH SOUND LANG COMPREHEN: CPT | Mod: GN | Performed by: IN HOME SUPPORTIVE CARE

## 2024-09-09 RX ORDER — TAMSULOSIN HYDROCHLORIDE 0.4 MG/1
0.8 CAPSULE ORAL NIGHTLY
Status: DISCONTINUED | OUTPATIENT
Start: 2024-09-09 | End: 2024-09-16 | Stop reason: HOSPADM

## 2024-09-09 ASSESSMENT — PAIN - FUNCTIONAL ASSESSMENT
PAIN_FUNCTIONAL_ASSESSMENT: 0-10

## 2024-09-09 ASSESSMENT — ACTIVITIES OF DAILY LIVING (ADL)
BATHING_ASSISTANCE: MAXIMAL
HOME_MANAGEMENT_TIME_ENTRY: 45

## 2024-09-09 ASSESSMENT — BRIEF INTERVIEW FOR MENTAL STATUS (BIMS)
ASKED TO RECALL BED: YES, NO CUE REQUIRED
INITIAL REPETITION OF BED BLUE SOCK - FIRST ATTEMPT: 2
WHAT YEAR IS IT: CORRECT
ASKED TO RECALL BLUE: YES, NO CUE REQUIRED
ASKED TO RECALL SOCK: YES, NO CUE REQUIRED
BIMS SUMMARY SCORE: 14
WHAT MONTH IS IT: ACCURATE WITHIN 5 DAYS
WHAT DAY OF THE WEEK IS IT: CORRECT

## 2024-09-09 ASSESSMENT — ENCOUNTER SYMPTOMS
SHORTNESS OF BREATH: 0
HEADACHES: 0
JOINT SWELLING: 0
DIARRHEA: 0
ENDOCRINE NEGATIVE: 1
MYALGIAS: 0
FREQUENCY: 0
VOMITING: 0
PSYCHIATRIC NEGATIVE: 1
CONSTIPATION: 0
CHEST TIGHTNESS: 0
FATIGUE: 0
DIFFICULTY URINATING: 0
PALPITATIONS: 0
FEVER: 0
DYSURIA: 0
DIZZINESS: 0
TROUBLE SWALLOWING: 1
SPEECH DIFFICULTY: 1
ABDOMINAL PAIN: 0
ACTIVITY CHANGE: 1
NUMBNESS: 0
COUGH: 0
NAUSEA: 0
WEAKNESS: 1

## 2024-09-09 ASSESSMENT — COGNITIVE AND FUNCTIONAL STATUS - GENERAL
MOVING FROM LYING ON BACK TO SITTING ON SIDE OF FLAT BED WITH BEDRAILS: A LITTLE
MOVING TO AND FROM BED TO CHAIR: TOTAL
WALKING IN HOSPITAL ROOM: TOTAL
MOBILITY SCORE: 10
STANDING UP FROM CHAIR USING ARMS: TOTAL
CLIMB 3 TO 5 STEPS WITH RAILING: TOTAL
TURNING FROM BACK TO SIDE WHILE IN FLAT BAD: A LITTLE

## 2024-09-09 ASSESSMENT — PAIN SCALES - GENERAL
PAINLEVEL_OUTOF10: 0 - NO PAIN

## 2024-09-09 NOTE — INDIVIDUALIZED OVERALL PLAN OF CARE NOTE
Physical Medicine and Rehabilitation  Individualized Overall Plan of Care    Patient Name: Regulo Marques  Medical Record Number: [id number]  YOB: 1940  Sex: Male  Payor Info: Payor: [facility] MEDICARE / Plan: [employer] MEDICARE / Product Type: *No Product type* /     Primary Rehab (Etiologic) Diagnosis: 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   IGC: Stroke: 01.2 Right Body Involvement (Left Brain)    Estimated Length of Stay: 21 days    Medical functional prognosis is good for the patient to be discharged home at moderate assist} with higher level assist for higher level ADL's.    Patient/Family anticipated outcome: Home as independent as possible.    Functional Outcome/Goal:  Bed mobility: minimal assist   Transfer sit to stand: minimal assist   Ambulation with: moderate assist  Upper extremity dressing: minimal assist   Lower extremity dressing: moderate assist  Stairs: moderate assist  Communicate needs and wants as independent as able  Tolerate least restrictive diet independently     Anticipated Interventions:  Therapeutic exercises  Gait Training   Transfer training  Balance and high-level mobility training  Exercises to improve balance and mobility  ADL retraining for grooming, bathing, ADL activities  Exercises to improve strength and endurance  Cognitive skills and training  Speech and language training  High level executive functioning training  Swallow advancement and training    Required Therapy:  Physical therapy 90 minutes 5 days/week.  Occupational therapy 90 minutes 5 days/week  Speech therapy 45 minutes 5 days/week    Patient is a Good    Assessment/Plan   Assessment & Plan  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  -Start speech therapy for swallowing cognition  -Will discuss with family regards stroke prophylaxis and starting of  anticoagulation due to recent GI bleed.  -Will follow-up with GI.  -Continue statin  -Continue blood pressure management, add medications as appropriate  Estimated length of stay is 20 days discharge with intermittent assist.  CVA (cerebrovascular accident due to intracerebral hemorrhage) (Multi)  -Continue plan of care as noted above    Anemia  -History of GI bleed.  -Will continue to follow H&H and treat as indicated  -Await starting anticoagulation until stable    Chronic diastolic heart failure (Multi)  -Monitor current cardiac function.  Currently on no medications for his cardiac dysfunction    Diabetes mellitus (Multi)  -Monitor blood sugars and adjust medications as indicated.    Essential hypertension, benign  -Monitor blood pressure and treat as indicated    History of malignant neoplasm of colon  -History of GI bleed.  Will follow-up with GI.  -Questionable pending anticoagulation    Longstanding persistent atrial fibrillation (Multi)  -Monitor heart rate and adjust medications as appropriate  -Pending decision for anticoagulation due to risk of GI bleed    Obesity (BMI 30-39.9)  -Monitor diet and continue supportive nutrition.  Atrial fibrillation (Multi)  -Hold anticoagulation at this time until clarification from family.  -Neurology was to start Eliquis.  Will connect the teams.  -Will obtain GI input if indicated.    Urinary retention  -on flomax  -look at TOV in the near future and plan of care with [facility]  -facilitate active BM  -increase moblity      Assessment & Plan  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  -Start speech therapy for swallowing cognition  -Will discuss with family regards stroke prophylaxis and starting of anticoagulation due to recent GI bleed.  -Will follow-up with GI.  -Continue statin  -Continue blood pressure management, add medications as appropriate  Estimated length  of stay is 20 days discharge with intermittent assist.  CVA (cerebrovascular accident due to intracerebral hemorrhage) (Multi)  -Continue plan of care as noted above     Anemia  -History of GI bleed.  -Will continue to follow H&H and treat as indicated  -Await starting anticoagulation until stable     Chronic diastolic heart failure (Multi)  -Monitor current cardiac function.  Currently on no medications for his cardiac dysfunction     Diabetes mellitus (Multi)  -Monitor blood sugars and adjust medications as indicated.     Essential hypertension, benign  -Monitor blood pressure and treat as indicated     History of malignant neoplasm of colon  -History of GI bleed.  Will follow-up with GI.  -Questionable pending anticoagulation     Longstanding persistent atrial fibrillation (Multi)  -Monitor heart rate and adjust medications as appropriate  -Pending decision for anticoagulation due to risk of GI bleed     Obesity (BMI 30-39.9)  -Monitor diet and continue supportive nutrition.  Atrial fibrillation (Multi)  -Hold anticoagulation at this time until clarification from family.  -Neurology was to start Eliquis.  Will connect the teams.  -Will obtain GI input if indicated.     Urinary retention  -on flomax  -look at TOV in the near future and plan of care with [facility]  -facilitate active BM  -increase moblity     Bowel/Bladder  -facilitate daily active BM   -continence of bladder per timed void schedule and rehab nursing  -check PVRs and treat appropriately     DVT prophylaxis  -SCDs and ambulation  -Chemoprophylaxis for DVT until ambulating >200 feet     FEN  -follow BMP and treat appropriately  -monitor oral intake daily          Allison Díaz MD  9/9/2024 3:28 PM

## 2024-09-09 NOTE — ASSESSMENT & PLAN NOTE
-on flomax  -look at TOV in the near future and plan of care with [facility]  -facilitate active BM  -increase moblity

## 2024-09-09 NOTE — PROGRESS NOTES
Occupational Therapy    Evaluation    Patient Name: Regulo Marques  MRN: [id number]  Today's Date: 9/9/2024  Time Calculation  Start Time: 0830  Stop Time: 0915  Time Calculation (min): 45 min        Assessment:  OT Assessment: Impaired ADLs, transfers, and mobility  Prognosis: Good  End of Session Communication: Bedside nurse  End of Session Patient Position: Up in chair, Alarm on  OT Assessment Results: Decreased ADL status, Decreased endurance, Decreased IADLs, Decreased functional mobility, Visual deficit, Decreased sensation, Decreased safe judgment during ADL, Decreased upper extremity strength, Decreased upper extremity range of motion, Decreased cognition, Decreased fine motor control, Decreased gross motor control, Non-functional left upper extremity  Prognosis: Good  Strengths: Attitude of self, Support of Caregivers  Barriers to Participation: Housing layout    Plan:  Treatment Interventions: ADL retraining, Endurance training, Patient/family training, Neuromuscular reeducation, Functional transfer training, Visual perceptual retraining, UE strengthening/ROM, Cognitive reorientation  OT Frequency:  (5 days/week, 90 mins/day, for 2 weeks)  OT Discharge Recommendations:  (anticipate need for up to mod assist with transfers, ADLs, and mobility)  Equipment Recommended upon Discharge: Wheeled walker, Bedside commode (tub bench, reacher)  Treatment Interventions: ADL retraining, Endurance training, Patient/family training, Neuromuscular reeducation, Functional transfer training, Visual perceptual retraining, UE strengthening/ROM, Cognitive reorientation    Subjective     General:  General  Reason for Referral: OT eval and treat: 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 Patient impulsive at times.  Having difficulty following 2-step commands.    Precautions:  Hearing/Visual Limitations: [place] (does not own hearing aids)  Medical Precautions: Fall precautions, Swallowing precautions (Left inattention; moctezuma catheter)        Pain:  Pain Assessment  Pain Assessment: 0-10  0-10 (Numeric) Pain Score: 0 - No pain    Objective   Cognition:  Overall Cognitive Status: Impaired (oriented x3-4; follows 1-step commands; increased difficulty with 2+ step commands; patient very [place], also affecting direction following. Patient easily distracted and impulsive. Requires cues for redirection to task throughout eval.)  Safety/Judgement:  (decreased safety awareness; decreased insight)  Impulsive: Severely           Home Living:  Type of Home:  (Pt lives in colonial style home. Bedroom and only bathroom are on 2nd floor. Spouse uses BSC on first floor due to urgency issues. Pt reports both spouse and self are independent with stair negotiation.)  Lives With:  Spouse in fair health, cane inside the home, scooter outside the home. Spouse does not drive. Pt has 3 living children.  Home Adaptive Equipment:  (Rollator and scooter)  Bathroom Shower/Tub:  tub/shower combo with seat and HHS; spouse uses tub seat, patient does not use tub seat (stands for shower)  Bathroom Toilet:  standard toilet on 2nd floor; BSC on 1st floor (only spouse uses)    Prior Function:  Level of [place]: Independent with ADLs and functional transfers (no device used in house; owns rollator and motorized scooter;)  Prior Function Comments: patient reportedly drives  Spouse manages cooking, cleaning; shared shopping; laundry in basement - patient manages, spouse does not access basement       ADL:  Eating Assistance: Stand by  Grooming Assistance:  Moderate  Bathing Assistance: Maximal  UE Dressing Assistance: Maximal  LE Dressing Assistance:  (max assist x2)  Toileting Assistance with Device:  (max assist x2)    Activity Tolerance:  Endurance: Decreased tolerance for upright activites    Bed Mobility/Transfers: Bed Mobility  Bed Mobility:  Rolling with bedrail and min/mod assist x1. Sit to supine with mod assist x2. Supine to sit with max assist x1 using bedrail. Pt is highly impulsive during bed mobility.    Transfers  Transfer:  max assist x2 sit to stand from wheelchair; max assist x2 stand-pivot transfer towards non-affected right aside; unsteady, impulsive      Functional Mobility:  Functional Mobility  Functional Mobility Performed:  max handheld assist x2-3 for approach steps with close wheelchair follow; forward flexed posture, heavy left lean    Sitting Balance:  Static Sitting Balance  Static Sitting-Level of Assistance:  (min/mod assist)  Dynamic Sitting Balance  Dynamic Sitting-Level of Assistance:  (max assist)    Standing Balance:  Static Standing Balance  Static Standing-Level of Assistance:  (max assist x2, left lean)        Vision:Vision - Basic Assessment  Current Vision:  (left inattention)  Visual History:  (normally wears glasses)      Sensation:  Light Touch:  (patient reports chronic numbness/tingling in hands (reported carpal tunnel); currently denies any new numbness/tingling; difficulty following directions for testing sensation, however appears to have impaired sensation)    Strength:  Strength Comments:  strength: RUE: 44 lbs (norm= 66 lbs);   LUE: 25 lbs (norm= 55 lbs)    Perception:  Inattention/Neglect: Cues to attend to left side of body, Cues to attend left visual field    Coordination:  Movements are Fluid and Coordinated:  (Pt unable to coordinate rapid alternating movements at wrists. Unable to coordinate finger opposition on left. Apraxia also noted during UE and LE testing.)     Hand Function:  Gross Grasp:  (impaired  left grasp)    Extremities: RUE   RUE :  (RUE ROM WFL except shld flex to ~100 degrees. Right shld strength grossly 3-/5. Distally, RUE strength WFL.)      LUE   LUE:  (LUE ROM WFL except shld flex to ~75 degrees, attempts to compensate with abduction. Left shld strength grossly 2+/5. Distally, LUE 4-/5.)      Education Documentation  Handouts, taught by Alicia Tobin OT at 9/9/2024 11:18 AM.  Learner: Patient  Readiness: Acceptance  Method: Explanation  Response: Verbalizes Understanding, Needs Reinforcement    Home Exercise Program, taught by Alicia Tobin OT at 9/9/2024 11:18 AM.  Learner: Patient  Readiness: Acceptance  Method: Explanation  Response: Verbalizes Understanding, Needs Reinforcement    ADL Training, taught by Alicia Tobin OT at 9/9/2024 11:18 AM.  Learner: Patient  Readiness: Acceptance  Method: Explanation  Response: Verbalizes Understanding, Needs Reinforcement     EDUCATION:  Education  Individual(s) Educated: Patient  Education Provided: POC discussed and agreed upon, Fall precautons, Diagnosis & Precautions  Patient/Caregiver Demonstrated Understanding: yes  Plan of Care Discussed and Agreed Upon: yes  Patient Response to Education: Patient/Caregiver Verbalized Understanding of Information  Education Comment: Patient declines any information at this time on intimacy/sex in regards to diagnosis.    Goals:  Encounter Problems       Encounter Problems (Active)       OT Problem       LTG - Patient will complete eating with set up. (Progressing)       Start:  09/09/24    Expected End:  09/23/24            LTG - Patient will complete grooming with sba. (Progressing)       Start:  09/09/24    Expected End:  09/23/24            LTG - Patient will complete toileting with mod assist. (Progressing)       Start:  09/09/24    Expected End:  09/23/24            LTG - Patient will complete bathing with min/mod assist. (Progressing)       Start:  09/09/24    Expected End:  09/23/24            LTG  - Patient will complete UE dressing with sba. (Progressing)       Start:  09/09/24    Expected End:  09/23/24            LTG - Patient will complete LE dressing using adaptive equip as needed with mod assist. (Progressing)       Start:  09/09/24    Expected End:  09/23/24            LTG - Patient will complete commode transfer via pivot or device as needed with mod assist. (Progressing)       Start:  09/09/24    Expected End:  09/23/24            LTG - Patient will complete tub/shower transfer using DME as needed with mod/max assist. (Progressing)       Start:  09/09/24    Expected End:  09/23/24            LTG - Patient will complete simple mobility using device as needed with mod assist. (Progressing)       Start:  09/09/24    Expected End:  09/23/24            LTG - Patient will complete wheelchair mobility with sba. (Progressing)       Start:  09/09/24    Expected End:  09/23/24            STG - Patient will complete grooming with min assist. (Progressing)       Start:  09/09/24    Expected End:  09/16/24            STG - Patient will complete toileting with mod assist x2. (Progressing)       Start:  09/09/24    Expected End:  09/16/24            STG - Patient will complete sponge bathing with mod/max assist. (Progressing)       Start:  09/09/24    Expected End:  09/16/24            STG - Patient will complete UE dressing with mod assist. (Progressing)       Start:  09/09/24    Expected End:  09/16/24            STG - Patient will complete LE dressing using adaptive equip as needed with mod assist x2. (Progressing)       Start:  09/09/24    Expected End:  09/16/24            STG - Patient will complete commode transfer via device as needed with min assist x2. (Progressing)       Start:  09/09/24    Expected End:  09/16/24            STG - Patient will complete simple wheelchair mobility with min assist. (Progressing)       Start:  09/09/24    Expected End:  09/16/24

## 2024-09-09 NOTE — H&P
Admission diagnosis: CVA    History Of Present Illness  Regulo Marques is a 83 y.o. male ADMITTED TO [facility] 9/2/24 presenting to the ER with acute onset of left sided weakness, expressive aphasia, dysarthria, mental status change, and slurred speech. Pt. Was out to dinner with his wife tonight when these symptoms developed. He was apparently in his normal state of health prior to dinner. He was taking Eliquis for A-Fib but was taken off of the Eliquis on 8/12/2024 2/2 an admission at [facility] for a GI bleed. Pt. 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    Patient does have the recent history of a GI bleed also with a history of hematuria.  He was started on aspirin by neuro.  There is a discussion with the family regarding starting anticoagulation but they do not want to until cleared by GI.    Admitting Diagnosis: CVA       Past Medical History  He has a past medical history of Diverticulitis of intestine, part unspecified, without perforation or abscess without bleeding, Personal history of malignant neoplasm, unspecified, Personal history of other diseases of the circulatory system, Personal history of other diseases of the digestive system, Personal history of other endocrine, nutritional and metabolic disease, Personal history of other malignant neoplasm of large intestine, and Personal history of other specified conditions.    Surgical History  He has a past surgical history that includes Colonoscopy (10/20/2016); Total hip arthroplasty (10/20/2016); Tonsillectomy (10/20/2016); Other surgical history (11/08/2019); and Other surgical history (11/08/2019).      Prior Functional Status:  Lives with Spouse. tHEY ARE MOVING INTO [facility] INDEPENDENT LIVING .  Self-Care: IND  Ambulation: IND  Stairs: IND  Cognition: A&OX3  Wheelchair: N/A  Devices: NONE     Current Functional Status:  Eating: MIN  Oral hygiene: CGA  Toileting: DEPENDENT- GANDHI  Bathing: MAX  Upper body dressing:  MOD  Lower body dressing: MAX  Bed Mobility: MIN MOD  Transfers: MIN MOD  Bladder and Bowel: INCONT STOOL. HAS GANDHI FOR URINARY RETENTION  Cognition: A&O ,APHASIC    Social History  He reports that he has quit smoking. His smoking use included cigarettes. He has never used smokeless tobacco. He reports that he does not drink alcohol and does not use drugs.     Allergies  Codeine    Medications  Current Facility-Administered Medications   Medication Dose Route Frequency Provider Last Rate Last Admin    acetaminophen (Tylenol) oral liquid 650 mg  650 mg oral q4h PRN Allison Díaz MD        Or    acetaminophen (Tylenol) tablet 650 mg  650 mg oral q6h PRN Allison Díaz MD   650 mg at 09/08/24 2359    alum-mag hydroxide-simeth (Mylanta) 200-200-20 mg/5 mL oral suspension 30 mL  30 mL oral q6h PRN Allison Díaz MD        atorvastatin (Lipitor) tablet 80 mg  80 mg oral Nightly Allison CLARENCE Díaz MD   80 mg at 09/08/24 2026    bisacodyl (Dulcolax) EC tablet 10 mg  10 mg oral Daily PRN Allison Díaz MD        bisacodyl (Dulcolax) suppository 10 mg  10 mg rectal Daily PRN Allison Díaz MD        insulin lispro (HumaLOG) injection 0-5 Units  0-5 Units subcutaneous TID Allison Díaz MD        melatonin tablet 3 mg  3 mg oral Nightly PRN Allison Díaz MD        sennosides (Senokot) tablet 8.6 mg  1 tablet oral Nightly Allison CLARENCE Díaz MD   8.6 mg at 09/08/24 2026    tamsulosin (Flomax) 24 hr capsule 0.4 mg  0.4 mg oral Nightly Allison CLARENCE Díaz MD   0.4 mg at 09/08/24 2026        Labs  Admission on 09/08/2024   Component Date Value Ref Range Status    POCT Glucose 09/08/2024 131 (H)  74 - 99 mg/dL Final    WBC 09/09/2024 6.9  4.4 - 11.3 x10*3/uL Final    nRBC 09/09/2024 0.0  0.0 - 0.0 /100 WBCs Final    RBC 09/09/2024 2.68 (L)  4.50 - 5.90 x10*6/uL Final    Hemoglobin  09/09/2024 8.4 (L)  13.5 - 17.5 g/dL Final    Hematocrit 09/09/2024 27.8 (L)  41.0 - 52.0 % Final    MCV 09/09/2024 104 (H)  80 - 100 fL Final    MCH 09/09/2024 31.3  26.0 - 34.0 pg Final    MCHC 09/09/2024 30.2 (L)  32.0 - 36.0 g/dL Final    RDW 09/09/2024 15.9 (H)  11.5 - 14.5 % Final    Platelets 09/09/2024 184  150 - 450 x10*3/uL Final    Glucose 09/09/2024 106 (H)  74 - 99 mg/dL Final    Sodium 09/09/2024 145  136 - 145 mmol/L Final    Potassium 09/09/2024 4.1  3.5 - 5.3 mmol/L Final    Chloride 09/09/2024 115 (H)  98 - 107 mmol/L Final    Bicarbonate 09/09/2024 24  21 - 32 mmol/L Final    Anion Gap 09/09/2024 10  10 - 20 mmol/L Final    Urea Nitrogen 09/09/2024 32 (H)  6 - 23 mg/dL Final    Creatinine 09/09/2024 1.51 (H)  0.50 - 1.30 mg/dL Final    eGFR 09/09/2024 45 (L)  >60 mL/min/1.73m*2 Final    Calculations of estimated GFR are performed using the 2021 CKD-EPI Study Refit equation without the race variable for the IDMS-Traceable creatinine methods.  https://jasn.asnjournals.org/content/early/2021/09/22/ASN.0281543124    Calcium 09/09/2024 9.2  8.6 - 10.3 mg/dL Final    POCT Glucose 09/09/2024 96  74 - 99 mg/dL Final    POCT Glucose 09/09/2024 133 (H)  74 - 99 mg/dL Final   Admission on 09/02/2024, Discharged on 09/08/2024   Component Date Value Ref Range Status    WBC 09/02/2024 8.1  4.4 - 11.3 x10*3/uL Final    nRBC 09/02/2024 0.0  0.0 - 0.0 /100 WBCs Final    RBC 09/02/2024 2.44 (L)  4.50 - 5.90 x10*6/uL Final    Hemoglobin 09/02/2024 7.7 (L)  13.5 - 17.5 g/dL Final    Hematocrit 09/02/2024 24.5 (L)  41.0 - 52.0 % Final    MCV 09/02/2024 100  80 - 100 fL Final    MCH 09/02/2024 31.6  26.0 - 34.0 pg Final    MCHC 09/02/2024 31.4 (L)  32.0 - 36.0 g/dL Final    RDW 09/02/2024 16.2 (H)  11.5 - 14.5 % Final    Platelets 09/02/2024 190  150 - 450 x10*3/uL Final    Neutrophils % 09/02/2024 75.0  40.0 - 80.0 % Final    Immature Granulocytes %, Automated 09/02/2024 0.4  0.0 - 0.9 % Final    Immature Granulocyte  Count (IG) includes promyelocytes, myelocytes and metamyelocytes but does not include bands. Percent differential counts (%) should be interpreted in the context of the absolute cell counts (cells/UL).    Lymphocytes % 09/02/2024 15.3  13.0 - 44.0 % Final    Monocytes % 09/02/2024 6.8  2.0 - 10.0 % Final    Eosinophils % 09/02/2024 2.3  0.0 - 6.0 % Final    Basophils % 09/02/2024 0.2  0.0 - 2.0 % Final    Neutrophils Absolute 09/02/2024 6.09 (H)  1.60 - 5.50 x10*3/uL Final    Percent differential counts (%) should be interpreted in the context of the absolute cell counts (cells/uL).    Immature Granulocytes Absolute, Au* 09/02/2024 0.03  0.00 - 0.50 x10*3/uL Final    Lymphocytes Absolute 09/02/2024 1.24  0.80 - 3.00 x10*3/uL Final    Monocytes Absolute 09/02/2024 0.55  0.05 - 0.80 x10*3/uL Final    Eosinophils Absolute 09/02/2024 0.19  0.00 - 0.40 x10*3/uL Final    Basophils Absolute 09/02/2024 0.02  0.00 - 0.10 x10*3/uL Final    Glucose 09/02/2024 209 (H)  74 - 99 mg/dL Final    Sodium 09/02/2024 139  136 - 145 mmol/L Final    Potassium 09/02/2024 4.1  3.5 - 5.3 mmol/L Final    Chloride 09/02/2024 108 (H)  98 - 107 mmol/L Final    Bicarbonate 09/02/2024 18 (L)  21 - 32 mmol/L Final    Anion Gap 09/02/2024 17  10 - 20 mmol/L Final    Urea Nitrogen 09/02/2024 60 (H)  6 - 23 mg/dL Final    Creatinine 09/02/2024 3.07 (H)  0.50 - 1.30 mg/dL Final    eGFR 09/02/2024 19 (L)  >60 mL/min/1.73m*2 Final    Calculations of estimated GFR are performed using the 2021 CKD-EPI Study Refit equation without the race variable for the IDMS-Traceable creatinine methods.  https://jasn.asnjournals.org/content/early/2021/09/22/ASN.1191151218    Calcium 09/02/2024 8.8  8.6 - 10.3 mg/dL Final    Albumin 09/02/2024 3.4  3.4 - 5.0 g/dL Final    Alkaline Phosphatase 09/02/2024 104  33 - 136 U/L Final    Total Protein 09/02/2024 5.9 (L)  6.4 - 8.2 g/dL Final    AST 09/02/2024 14  9 - 39 U/L Final    Bilirubin, Total 09/02/2024 0.3  0.0 - 1.2  mg/dL Final    ALT 09/02/2024 18  10 - 52 U/L Final    Patients treated with Sulfasalazine may generate falsely decreased results for ALT.    Troponin I, High Sensitivity 09/02/2024 143 (HH)  0 - 20 ng/L Final    Protime 09/02/2024 12.3  9.8 - 12.8 seconds Final    INR 09/02/2024 1.1  0.9 - 1.1 Final    aPTT 09/02/2024 29  27 - 38 seconds Final    Ventricular Rate 09/02/2024 69  BPM Final    Atrial Rate 09/02/2024 0  BPM Final    IN Interval 09/02/2024 153  ms Final    QRS Duration 09/02/2024 116  ms Final    QT Interval 09/02/2024 442  ms Final    QTC Calculation(Bazett) 09/02/2024 474  ms Final    R Axis 09/02/2024 -85  degrees Final    T [place] 09/02/2024 98  degrees Final    QRS Count 09/02/2024 11  beats Final    Q Onset 09/02/2024 249  ms Final    T Offset 09/02/2024 470  ms Final    QTC Fredericia 09/02/2024 463  ms Final    POCT Glucose 09/02/2024 219 (H)  74 - 99 mg/dL Final    Extra Tube 09/02/2024 Hold for add-ons.   Final    Auto resulted.    Extra Tube 09/02/2024 Hold for add-ons.   Final    Auto resulted.    Color, Urine 09/03/2024 Colorless (N)  Light-Yellow, Yellow, Dark-Yellow Final    Appearance, Urine 09/03/2024 Turbid (N)  Clear Final    Specific Gravity, Urine 09/03/2024 1.022  1.005 - 1.035 Final    pH, Urine 09/03/2024 5.5  5.0, 5.5, 6.0, 6.5, 7.0, 7.5, 8.0 Final    Protein, Urine 09/03/2024 30 (1+) (A)  NEGATIVE, 10 (TRACE), 20 (TRACE) mg/dL Final    Glucose, Urine 09/03/2024 Normal  Normal mg/dL Final    Blood, Urine 09/03/2024 OVER (3+) (A)  NEGATIVE Final    Ketones, Urine 09/03/2024 NEGATIVE  NEGATIVE mg/dL Final    Bilirubin, Urine 09/03/2024 NEGATIVE  NEGATIVE Final    Urobilinogen, Urine 09/03/2024 Normal  Normal mg/dL Final    Nitrite, Urine 09/03/2024 NEGATIVE  NEGATIVE Final    Leukocyte Esterase, Urine 09/03/2024 500 Sona/µL (A)  NEGATIVE Final    Troponin I, High Sensitivity 09/02/2024 144 (HH)  0 - 20 ng/L Final    Previous result verified on 9/2/2024 2100 on specimen/case  [id number] called with component [facility] for procedure Troponin I, High Sensitivity with value 143 ng/L.    Cholesterol 09/02/2024 89  0 - 199 mg/dL Final          Age      Desirable   Borderline High   High     0-19 Y     0 - 169       170 - 199     >/= 200    20-24 Y     0 - 189       190 - 224     >/= 225         >24 Y     0 - 199       200 - 239     >/= 240   **All ranges are based on fasting samples. Specific   therapeutic targets will vary based on patient-specific   cardiac risk.    Pediatric guidelines reference:Pediatrics 2011, 128(S5).Adult guidelines reference: NCEP ATPIII Guidelines,DAI 2001, 258:2486-97    Venipuncture immediately after or during the administration of Metamizole may lead to falsely low results. Testing should be performed immediately prior to Metamizole dosing.    HDL-Cholesterol 09/02/2024 45.5  mg/dL Final      Age       Very Low   Low     Normal    High    0-19 Y    < 35      < 40     40-45     ----  20-24 Y    ----     < 40      >45      ----        >24 Y      ----     < 40     40-60      >60      Cholesterol/HDL Ratio 09/02/2024 2.0   Final      Ref Values  Desirable  < 3.4  High Risk  > 5.0    LDL Calculated 09/02/2024 33  <=99 mg/dL Final                                Near   Borderline      AGE      Desirable  Optimal    High     High     Very High     0-19 Y     0 - 109     ---    110-129   >/= 130     ----    20-24 Y     0 - 119     ---    120-159   >/= 160     ----      >24 Y     0 -  99   100-129  130-159   160-189     >/=190      VLDL 09/02/2024 11  0 - 40 mg/dL Final    Triglycerides 09/02/2024 54  0 - 149 mg/dL Final       Age         Desirable   Borderline High   High     Very High   0 D-90 D    19 - 174         ----         ----        ----  91 D- 9 Y     0 -  74        75 -  99     >/= 100      ----    10-19 Y     0 -  89        90 - 129     >/= 130      ----    20-24 Y     0 - 114       115 - 149     >/= 150      ----         >24 Y     0 - 149       150 - 199     200- 499    >/= 500    Venipuncture immediately after or during the administration of Metamizole may lead to falsely low results. Testing should be performed immediately prior to Metamizole dosing.    Non HDL Cholesterol 09/02/2024 44  0 - 149 mg/dL Final          Age       Desirable   Borderline High   High     Very High     0-19 Y     0 - 119       120 - 144     >/= 145    >/= 160    20-24 Y     0 - 149       150 - 189     >/= 190      ----         >24 Y    30 mg/dL above LDL Cholesterol goal      Hemoglobin A1C 09/02/2024 5.7 (H)  see below % Final    Estimated Average Glucose 09/02/2024 117  Not Established mg/dL Final    BNP 09/02/2024 281 (H)  0 - 99 pg/mL Final    POCT Glucose 09/02/2024 154 (H)  74 - 99 mg/dL Final    RN NOTIFIED    POCT Glucose 09/03/2024 152 (H)  74 - 99 mg/dL Final    POCT Glucose 09/03/2024 125 (H)  74 - 99 mg/dL Final    RN NOTIFIED    Troponin I, High Sensitivity 09/03/2024 139 (HH)  0 - 20 ng/L Final    Previous result verified on 9/2/2024 2100 on specimen/case [id number] called with component [facility] for procedure Troponin I, High Sensitivity with value 143 ng/L.    AV pk tami 09/03/2024 1.39  m/s Final    AV mn grad 09/03/2024 5.0  mmHg Final    LVOT diam 09/03/2024 1.80  cm Final    LA vol index A/L 09/03/2024 45.9  ml/m2 Final    Tricuspid annular plane systolic e* 09/03/2024 1.4  cm Final    LV EF 09/03/2024 68  % Final    RV free wall pk S' 09/03/2024 7.18  cm/s Final    LVIDd 09/03/2024 4.30  cm Final    RVSP 09/03/2024 36.7  mmHg Final    Aortic Valve Area by Continuity of* 09/03/2024 1.59  cm2 Final    Aortic Valve Area by Continuity of* 09/03/2024 1.55  cm2 Final    AV pk grad 09/03/2024 7.7  mmHg Final    LV A4C EF 09/03/2024 43.6   Final    Extra Tube 09/03/2024 Hold for add-ons.   Final    Auto resulted.    Glucose 09/03/2024 125 (H)  74 - 99 mg/dL Final    Sodium 09/03/2024 140  136 - 145 mmol/L Final    Potassium 09/03/2024 4.2  3.5 - 5.3 mmol/L Final    Chloride  09/03/2024 111 (H)  98 - 107 mmol/L Final    Bicarbonate 09/03/2024 20 (L)  21 - 32 mmol/L Final    Anion Gap 09/03/2024 13  10 - 20 mmol/L Final    Urea Nitrogen 09/03/2024 57 (H)  6 - 23 mg/dL Final    Creatinine 09/03/2024 2.67 (H)  0.50 - 1.30 mg/dL Final    eGFR 09/03/2024 23 (L)  >60 mL/min/1.73m*2 Final    Calculations of estimated GFR are performed using the 2021 CKD-EPI Study Refit equation without the race variable for the IDMS-Traceable creatinine methods.  https://jasn.asnjournals.org/content/early/2021/09/22/ASN.2990855401    Calcium 09/03/2024 9.0  8.6 - 10.3 mg/dL Final    WBC 09/03/2024 8.4  4.4 - 11.3 x10*3/uL Final    nRBC 09/03/2024 0.0  0.0 - 0.0 /100 WBCs Final    RBC 09/03/2024 2.49 (L)  4.50 - 5.90 x10*6/uL Final    Hemoglobin 09/03/2024 7.9 (L)  13.5 - 17.5 g/dL Final    Hematocrit 09/03/2024 24.8 (L)  41.0 - 52.0 % Final    MCV 09/03/2024 100  80 - 100 fL Final    MCH 09/03/2024 31.7  26.0 - 34.0 pg Final    MCHC 09/03/2024 31.9 (L)  32.0 - 36.0 g/dL Final    RDW 09/03/2024 16.1 (H)  11.5 - 14.5 % Final    Platelets 09/03/2024 193  150 - 450 x10*3/uL Final    POCT Glucose 09/03/2024 148 (H)  74 - 99 mg/dL Final    POCT Glucose 09/03/2024 126 (H)  74 - 99 mg/dL Final    POCT Glucose 09/03/2024 124 (H)  74 - 99 mg/dL Final    WBC, Urine 09/03/2024 >50 (A)  1-5, NONE /HPF Final    WBC Clumps, Urine 09/03/2024 FEW  Reference range not established. /HPF Final    RBC, Urine 09/03/2024 >20 (A)  NONE, 1-2, 3-5 /HPF Final    Bacteria, Urine 09/03/2024 1+ (A)  NONE SEEN /HPF Final    Mucus, Urine 09/03/2024 FEW  Reference range not established. /LPF Final    Amorphous Crystals, Urine 09/03/2024 1+  NONE, 1+, 2+ /HPF Final    Color, Urine 09/04/2024 Light-Yellow  Light-Yellow, Yellow, Dark-Yellow Final    Appearance, Urine 09/04/2024 Turbid (N)  Clear Final    Specific Gravity, Urine 09/04/2024 1.019  1.005 - 1.035 Final    pH, Urine 09/04/2024 5.5  5.0, 5.5, 6.0, 6.5, 7.0, 7.5, 8.0 Final    Protein,  Urine 09/04/2024 70 (1+) (A)  NEGATIVE, 10 (TRACE), 20 (TRACE) mg/dL Final    Glucose, Urine 09/04/2024 Normal  Normal mg/dL Final    Blood, Urine 09/04/2024 0.2 (2+) (A)  NEGATIVE Final    Ketones, Urine 09/04/2024 NEGATIVE  NEGATIVE mg/dL Final    Bilirubin, Urine 09/04/2024 NEGATIVE  NEGATIVE Final    Urobilinogen, Urine 09/04/2024 Normal  Normal mg/dL Final    Nitrite, Urine 09/04/2024 NEGATIVE  NEGATIVE Final    Leukocyte Esterase, Urine 09/04/2024 500 Sona/µL (A)  NEGATIVE Final    Glucose 09/04/2024 115 (H)  74 - 99 mg/dL Final    Sodium 09/04/2024 142  136 - 145 mmol/L Final    Potassium 09/04/2024 4.2  3.5 - 5.3 mmol/L Final    Chloride 09/04/2024 115 (H)  98 - 107 mmol/L Final    Bicarbonate 09/04/2024 20 (L)  21 - 32 mmol/L Final    Anion Gap 09/04/2024 11  10 - 20 mmol/L Final    Urea Nitrogen 09/04/2024 50 (H)  6 - 23 mg/dL Final    Creatinine 09/04/2024 2.36 (H)  0.50 - 1.30 mg/dL Final    eGFR 09/04/2024 26 (L)  >60 mL/min/1.73m*2 Final    Calculations of estimated GFR are performed using the 2021 CKD-EPI Study Refit equation without the race variable for the IDMS-Traceable creatinine methods.  https://jasn.asnjournals.org/content/early/2021/09/22/ASN.7289939248    Calcium 09/04/2024 8.9  8.6 - 10.3 mg/dL Final    WBC 09/04/2024 8.2  4.4 - 11.3 x10*3/uL Final    nRBC 09/04/2024 0.2 (H)  0.0 - 0.0 /100 WBCs Final    RBC 09/04/2024 2.39 (L)  4.50 - 5.90 x10*6/uL Final    Hemoglobin 09/04/2024 7.5 (L)  13.5 - 17.5 g/dL Final    Hematocrit 09/04/2024 24.1 (L)  41.0 - 52.0 % Final    MCV 09/04/2024 101 (H)  80 - 100 fL Final    MCH 09/04/2024 31.4  26.0 - 34.0 pg Final    MCHC 09/04/2024 31.1 (L)  32.0 - 36.0 g/dL Final    RDW 09/04/2024 16.1 (H)  11.5 - 14.5 % Final    Platelets 09/04/2024 197  150 - 450 x10*3/uL Final    Neutrophils % 09/04/2024 83.8  40.0 - 80.0 % Final    Immature Granulocytes %, Automated 09/04/2024 0.4  0.0 - 0.9 % Final    Immature Granulocyte Count (IG) includes promyelocytes,  myelocytes and metamyelocytes but does not include bands. Percent differential counts (%) should be interpreted in the context of the absolute cell counts (cells/UL).    Lymphocytes % 09/04/2024 8.3  13.0 - 44.0 % Final    Monocytes % 09/04/2024 5.6  2.0 - 10.0 % Final    Eosinophils % 09/04/2024 1.7  0.0 - 6.0 % Final    Basophils % 09/04/2024 0.2  0.0 - 2.0 % Final    Neutrophils Absolute 09/04/2024 6.84 (H)  1.60 - 5.50 x10*3/uL Final    Percent differential counts (%) should be interpreted in the context of the absolute cell counts (cells/uL).    Immature Granulocytes Absolute, Au* 09/04/2024 0.03  0.00 - 0.50 x10*3/uL Final    Lymphocytes Absolute 09/04/2024 0.68 (L)  0.80 - 3.00 x10*3/uL Final    Monocytes Absolute 09/04/2024 0.46  0.05 - 0.80 x10*3/uL Final    Eosinophils Absolute 09/04/2024 0.14  0.00 - 0.40 x10*3/uL Final    Basophils Absolute 09/04/2024 0.02  0.00 - 0.10 x10*3/uL Final    POCT Glucose 09/03/2024 122 (H)  74 - 99 mg/dL Final    POCT Glucose 09/03/2024 120 (H)  74 - 99 mg/dL Final    POCT Glucose 09/04/2024 115 (H)  74 - 99 mg/dL Final    POCT Glucose 09/04/2024 107 (H)  74 - 99 mg/dL Final    POCT Glucose 09/04/2024 128 (H)  74 - 99 mg/dL Final    POCT Glucose 09/04/2024 123 (H)  74 - 99 mg/dL Final    WBC, Urine 09/04/2024 >50 (A)  1-5, NONE /HPF Final    WBC Clumps, Urine 09/04/2024 OCCASIONAL  Reference range not established. /HPF Final    RBC, Urine 09/04/2024 11-20 (A)  NONE, 1-2, 3-5 /HPF Final    Mucus, Urine 09/04/2024 FEW  Reference range not established. /LPF Final    Amorphous Crystals, Urine 09/04/2024 1+  NONE, 1+, 2+ /HPF Final    Urine Culture 09/04/2024 No growth   Final    POCT Glucose 09/04/2024 131 (H)  74 - 99 mg/dL Final    POCT Glucose 09/05/2024 149 (H)  74 - 99 mg/dL Final    POCT Glucose 09/05/2024 153 (H)  74 - 99 mg/dL Final    POCT Glucose 09/05/2024 156 (H)  74 - 99 mg/dL Final    POCT Glucose 09/05/2024 159 (H)  74 - 99 mg/dL Final    POCT Glucose 09/05/2024  "146 (H)  74 - 99 mg/dL Final    POCT Glucose 09/06/2024 121 (H)  74 - 99 mg/dL Final    POCT Glucose 09/06/2024 130 (H)  74 - 99 mg/dL Final    POCT Glucose 09/06/2024 133 (H)  74 - 99 mg/dL Final    POCT Glucose 09/06/2024 120 (H)  74 - 99 mg/dL Final    POCT Glucose 09/07/2024 113 (H)  74 - 99 mg/dL Final    POCT Glucose 09/07/2024 123 (H)  74 - 99 mg/dL Final    POCT Glucose 09/07/2024 127 (H)  74 - 99 mg/dL Final    POCT Glucose 09/07/2024 106 (H)  74 - 99 mg/dL Final    POCT Glucose 09/08/2024 103 (H)  74 - 99 mg/dL Final    RN NOTIFIED    POCT Glucose 09/08/2024 117 (H)  74 - 99 mg/dL Final        Last Recorded Vitals  Blood pressure 141/64, pulse (!) 45, temperature 36 °C (96.8 °F), resp. rate 18, height 1.753 m (5' 9.02\"), weight 93 kg (205 lb 0.4 oz), SpO2 98%.    Review of Systems   Constitutional:  Positive for activity change (Difficulty with overall mobility and ambulation). Negative for fatigue and fever.   HENT:  Positive for trouble swallowing (Modified diet.).    Respiratory:  Negative for cough, chest tightness and shortness of breath.    Cardiovascular:  Negative for chest pain, palpitations and leg swelling.   Gastrointestinal:  Negative for abdominal pain, constipation, diarrhea, nausea and vomiting.   Endocrine: Negative.    Genitourinary:  Negative for difficulty urinating, dysuria, frequency and urgency.   Musculoskeletal:  Positive for gait problem (Need support for mobility up to max assist of 1-2). Negative for joint swelling and myalgias.   Skin: Negative.    Neurological:  Positive for speech difficulty (Dysarthria) and weakness (Weakness present the left upper and lower extremity.). Negative for dizziness, numbness and headaches.   Psychiatric/Behavioral: Negative.            Physical Exam  Constitutional:       Comments: Disheveled. Obese. Has awareness of his Stroke and the fact he is not moving as well.    HENT:      Head: Normocephalic and atraumatic.   Eyes:      Extraocular " Movements: Extraocular movements intact.      Conjunctiva/sclera: Conjunctivae normal.      Pupils: Pupils are equal, round, and reactive to light.   Cardiovascular:      Rate and Rhythm: Normal rate. Rhythm irregular.   Pulmonary:      Effort: Pulmonary effort is normal.      Breath sounds: Normal breath sounds.   Abdominal:      General: Bowel sounds are normal. There is distension (mild distension.).      Palpations: Abdomen is soft.      Tenderness: There is no abdominal tenderness. There is no guarding.      Comments: Midline incision scar present   Musculoskeletal:         General: No swelling or tenderness.      Comments: LE PVD changes. Functional ROM x 4 extremities.   Skin:     General: Skin is warm and dry.   Neurological:      Mental Status: He is alert and oriented to person, place, and time.      Motor: Weakness (Left upper and lower extremity weakness appreciated. with gross motor strength at 4/5. Decreased left UE awareness) present.      Gait: Gait abnormal (Max assist of 1-2).      Comments: Left inattention.   Psychiatric:         Mood and Affect: Mood normal.      Comments: Pleasant and somewhat tangential.           Assessment/Plan   Assessment & Plan  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  -Start speech therapy for swallowing cognition  -Will discuss with family regards stroke prophylaxis and starting of anticoagulation due to recent GI bleed.  -Will follow-up with GI.  -Continue statin  -Continue blood pressure management, add medications as appropriate  Estimated length of stay is 20 days discharge with intermittent assist.  CVA (cerebrovascular accident due to intracerebral hemorrhage) (Multi)  -Continue plan of care as noted above    Anemia  -History of GI bleed.  -Will continue to follow H&H and treat as indicated  -Await starting anticoagulation until stable    Chronic  diastolic heart failure (Multi)  -Monitor current cardiac function.  Currently on no medications for his cardiac dysfunction    Diabetes mellitus (Multi)  -Monitor blood sugars and adjust medications as indicated.    Essential hypertension, benign  -Monitor blood pressure and treat as indicated    History of malignant neoplasm of colon  -History of GI bleed.  Will follow-up with GI.  -Questionable pending anticoagulation    Longstanding persistent atrial fibrillation (Multi)  -Monitor heart rate and adjust medications as appropriate  -Pending decision for anticoagulation due to risk of GI bleed    Obesity (BMI 30-39.9)  -Monitor diet and continue supportive nutrition.  Atrial fibrillation (Multi)  -Hold anticoagulation at this time until clarification from family.  -Neurology was to start Eliquis.  Will connect the teams.  -Will obtain GI input if indicated.    Urinary retention  -on flomax  -look at TOV in the near future and plan of care with [facility]  -facilitate active BM  -increase moblity    Bowel/Bladder  -facilitate daily active BM   -continence of bladder per timed void schedule and rehab nursing  -check PVRs and treat appropriately    DVT prophylaxis  -SCDs and ambulation  -Chemoprophylaxis for DVT until ambulating >200 feet    FEN  -follow BMP and treat appropriately  -monitor oral intake daily    Physician Physical Assessment/ Post admission Evaluation (DAWIT)     I have reviewed the preadmission rehabilitation screening. There have been no relevant changes since the preadmission screening;    Rehab Plan of Care   The Interdisciplinary Team will work collaboratively to address the patient problems and goals to be managed by the Individualized Interdisciplinary Plan of Care. See the IPOC note for a complete review of the plan of care.    Medical Necessity:  Regulo JEFFERSON Shelli requires, and is capable of participating in, an intensive and coordinated interdisciplinary acute inpatient rehabilitation program. He  requires close rehabilitation physician monitoring and management to monitor his complex medical conditions and coordinate rehabilitation care. The patient's rehabilitation goals and medical complexity cannot adequately be managed in a less intensive setting. Potential risks for clinical complications include: GI bleed, fall, stroke.    Medical Prognosis:  Good for continued progress and participation with therapy.    The estimated length of stay: 26 days home and moderate assist.      Total time spent with patient 65 minutes with discussion with patient, rehab team, review of history, physical exam and documentation along with the development of the plan of care.    Plan of care developed today after review of team notes.  The plan of care was reviewed with the patient.      Allison Díaz MD

## 2024-09-09 NOTE — CARE PLAN
The patient's goals for the shift include  increase strength.    The clinical goals for the shift include remain HD stable    Over the shift, the patient did not make progress toward the following goals. Barriers to progression include weakness. Recommendations to address these barriers include therapy.

## 2024-09-09 NOTE — PROGRESS NOTES
Physical Therapy    Physical Therapy Treatment    Patient Name: Regulo Marques  MRN: [id number]  Today's Date: 9/9/2024  Time Calculation  Start Time: 1045  Stop Time: 1140  Time Calculation (min): 55 min         Assessment/Plan   PT Assessment  PT Assessment Results: Decreased strength, Decreased endurance, Impaired balance, Decreased mobility, Decreased coordination, Decreased cognition, Impaired judgement, Decreased safety awareness, Impaired hearing, Impaired sensation  Rehab Prognosis:  (Good for stated goals)  Barriers to Discharge: Impaired cognition, impulsivity, left inattention, housing lay-out/stairs  Strengths: Support of Caregivers  End of Session Communication: Bedside nurse  End of Session Patient Position: Up in chair, Alarm on     PT Plan  Treatment/Interventions: Bed mobility, Transfer training, Gait training, Stair training, Balance training, Strengthening, Endurance training, Therapeutic activity, Therapeutic exercise, Neuromuscular re-education  PT Plan: Ongoing PT  PT Frequency: 5 times per week (6x prn)  PT Discharge Recommendations:  (Anticipate discharge home with the need for mod assist at the walker level and will recommend first floor set-up.)  Equipment Recommended upon Discharge: Wheeled walker  PT Recommended Transfer Status: Assist x2      General Visit Information:   PT  Visit  PT Received On: 09/09/24  General  Reason for Referral: PT eval and treat 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  Decreased awareness of body in space.  Apraxia also noted.)    Subjective    Precautions:  Precautions  Hearing/Visual Limitations: [place]  Medical Precautions: Fall precautions, Swallowing precautions (Left inattention)  Precautions Comment: Pureed diet and mildly thick liquids      Objective   Pain:  Pain Assessment  Pain Assessment: 0-10  0-10 (Numeric) Pain Score: 0 - No pain    Treatments:  Therapeutic Exercise  Therapeutic Exercise Performed:  (Pt performed seated BLE strengthening and coordination ex 2 x 10 reps each with mod/max verbal and manual cueing.  Instruction to slow down movements and improve motor control.  LLE ataxia noted.)    Therapeutic Activity  Therapeutic Activity Performed:  (Pt standing at boubacar-bar x1 minute with max assist x2.  Standing posture:  trunk forward flexion, left lean, LLE adduction, decreased LLE wt bearing.  NDT hand placements used to facilitate proper posture and improve alignment.)    Ambulation/Gait Training  Ambulation/Gait Training Performed:  (Pt amb 20 ft in a straight line with wheeled walker using left hand  assist and max assist x2 and assist of another therapist in front of pt to assist with LLE abduction.  Poor left foot placement, severe left lean, trunk forward flexion.)    Transfers  Transfer:  (Sit to stand and pivot transfers without a device with max assist x2. Very poor body awareness.  Pt often unintentionally performing the opposite of what the therapist is requesting.)    Wheelchair Activities  Wheelchair Size:  (Wheelchair changes and adjustments made to improve comfort and alignment.)    EDUCATION:  Education Comment:  (Pt educated on condition, stroke recovery, rehab process and safe mobility techniques.  Limited recall of education.)    Encounter Problems       Encounter Problems (Active)       PT Problem       STG - Bed mobility with max assist x1.   (Progressing)       Start:  09/09/24    Expected End:  09/16/24            STG - Transfers with mod assist x2. (Progressing)       Start:  09/09/24    Expected End:   09/16/24            STG - Pt will amb 20 ft in a str line with approp device and mod assist x2.   (Progressing)       Start:  09/09/24    Expected End:  09/16/24            STG - Will determine most approp means of entry into home.   (Progressing)       Start:  09/09/24    Expected End:  09/16/24            LTG - Bed mobility with mod assist x1. (Progressing)       Start:  09/09/24    Expected End:  09/23/24            LTG - Transfers with mod assist x1. (Progressing)       Start:  09/09/24    Expected End:  09/23/24            LTG - Pt will amb 50 ft with approp device and mod assist x1. (Progressing)       Start:  09/09/24    Expected End:  09/23/24            LTG - Will educate pt and family on most approp means of entry into home.   (Progressing)       Start:  09/09/24    Expected End:  09/23/24

## 2024-09-09 NOTE — PROGRESS NOTES
Physical Therapy    Physical Therapy Evaluation    Patient Name: Regulo Marques  MRN: [id number]  Today's Date: 9/9/2024   Time Calculation  Start Time: 0830  Stop Time: 0915  Time Calculation (min): 45 min    Assessment/Plan   PT Assessment  PT Assessment Results: Decreased strength, Decreased endurance, Impaired balance, Decreased mobility, Decreased coordination, Decreased cognition, Impaired judgement, Decreased safety awareness, Impaired hearing, Impaired sensation  Rehab Prognosis:  (Good for stated goals)  Barriers to Discharge: Impaired cognition, impulsivity, left inattention, housing lay-out/stairs  Strengths: Support of Caregivers  End of Session Communication: Bedside nurse  End of Session Patient Position: Up in chair, Alarm on  IP OR SWING BED PT PLAN  Inpatient or Swing Bed: Inpatient  PT Plan  Treatment/Interventions: Bed mobility, Transfer training, Gait training, Stair training, Balance training, Strengthening, Endurance training, Therapeutic activity, Therapeutic exercise, Neuromuscular re-education  PT Plan: Ongoing PT  PT Frequency: 5 times per week (6x prn)  PT Discharge Recommendations:  (Anticipate discharge home with the need for mod assist at the walker level and will recommend first floor set-up.)  Equipment Recommended upon Discharge: Wheeled walker  PT Recommended Transfer Status: Assist x2      Subjective   General Visit Information:  General  Reason for Referral: PT eval and treat 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  Difficulty following 2-step commands.  Severely diminished body awareness in space.  Home  Living:  Home Living  Type of Home: House (Pt lives in colonial style home.  Bedroom and only bathroom are on 2nd floor.  Spouse uses BSC on first floor due to urgency issues.  Pt reports both spouse and self are independent with stair negotiation.)  Lives With:  (Spouse in fair health.  Uses cane inside the home, scooter outside the home.  Spouse does not drive.  Pt has 3 living children.)  Home Adaptive Equipment:  (Rollator walker and scooter)  Home Access:  (One step through side door or 3 steps with handrail through front door)  Prior Level of Function:  Prior Function Per Pt/Caregiver Report  Prior Function Comments:  (Pt reportedly does drive.)  Precautions:  Precautions  Hearing/Visual Limitations: [place]  Precautions Comment: Pureed diet and mildly thick liquids      Objective   Pain:  Pain Assessment  Pain Assessment: 0-10  0-10 (Numeric) Pain Score: 0 - No pain  Cognition:  Cognition  Overall Cognitive Status: Impaired (oriented x3-4; follows 1-step commands; increased difficulty with 2+ step commands; patient very [place], also affecting direction following. Patient easily distracted and impulsive. Requires cues for redirection to task throughout eval.)  Orientation Level:  (Pt alert and oriented x3.)  Attention:  (Pt is distracted and mildly difficult to direct at times.)  Safety/Judgement:  (Decreased safety awareness.  Decreased awareness of deficits.)  Impulsive: Severely    General Assessments:  General Observation  General Observation:  (Off and on tremors/shaking RUE.)     Activity Tolerance  Endurance: Decreased tolerance for upright activites    Sensation  Sensation Comment:  (Pt with difficulty following sensory testing commands.  Pt does appear to have light touch and proprioceptive deficits at LUE and LLE.)    Coordination  Coordination Comment:  (Pt unable to coordinate rapid alternating movements at wrists or ankles.  Unable to coordinate finger opposition on left.  Apraxia also noted during UE  and LE testing.)    Static Sitting Balance  Static Sitting-Level of Assistance:  (Static sitting balance requires up to mod assist to maintain midline.)  Dynamic Sitting Balance  Dynamic Sitting-Comments:  (Pt requires up to max assist for minimally dynamic balance activities.)    Static Standing Balance  Static Standing-Level of Assistance:  (Mod assist x2.)  Dynamic Standing Balance  Dynamic Standing-Comments:  (Max assist x2.)  Functional Assessments:  Bed Mobility  Bed Mobility:  (Rolling with bedrail and min/mod assist x1.  Sit to supine with mod assist x2.  Supine to sit with max assist x1 using bedrail.  Pt is highly impulsive during bed mobility.)    Transfers  Transfer:  (Sit to stand and pivot transfers with mod assist x2.  Mobility is shaky, unsteady, trunk forward flexion, difficulty advancing LEs.)    Ambulation/Gait Training  Ambulation/Gait Training Performed:  (Pt amb 5 ft with max arm assist x2.  Gait form:  Marked left lean, trunk forward flexion, buckling at hips and knees, left foot drag, difficulty advancing BLE.)    Stairs  Stairs:  (Unsafe to attempt due to pt's significantly impaired mobility.)     Object From Floor  Comments: Unsafe to attempt  Extremity/Trunk Assessments:  RUE   RUE :  (RUE ROM WFL except shld flex to ~100 degrees.  Right shld strength grossly 3-/5.  Distally, RUE strength WFL.)  LUE   LUE:  (LUE ROM WFL except shld flex to ~90 degrees.  Left shld strength grossly 2+/5.  Distally, LUE strength WFL.)  RLE   RLE :  (RLE ROM and strength WFL.)  LLE   LLE :  (LLE ROM WFL.  LLE strength grossly 3 to 3+/5.)      Encounter Problems       Encounter Problems (Active)       PT Problem       STG - Bed mobility with max assist x1.   (Progressing)       Start:  09/09/24    Expected End:  09/16/24            STG - Transfers with mod assist x2. (Progressing)       Start:  09/09/24    Expected End:  09/16/24            STG - Pt will amb 20 ft in a str line with approp device and mod  assist x2.   (Progressing)       Start:  09/09/24    Expected End:  09/16/24            STG - Will determine most approp means of entry into home.   (Progressing)       Start:  09/09/24    Expected End:  09/16/24            LTG - Bed mobility with mod assist x1. (Progressing)       Start:  09/09/24    Expected End:  09/23/24            LTG - Transfers with mod assist x1. (Progressing)       Start:  09/09/24    Expected End:  09/23/24            LTG - Pt will amb 50 ft with approp device and mod assist x1. (Progressing)       Start:  09/09/24    Expected End:  09/23/24            LTG - Will educate pt and family on most approp means of entry into home.   (Progressing)       Start:  09/09/24    Expected End:  09/23/24                   Education Documentation  Body Mechanics, taught by Kristin Doll, PT at 9/9/2024 11:49 AM.  Learner: Patient  Readiness: Acceptance  Method: Explanation, Demonstration  Response: Verbalizes Understanding, Needs Reinforcement    Mobility Training, taught by Kristin Doll, PT at 9/9/2024 11:49 AM.  Learner: Patient  Readiness: Acceptance  Method: Explanation, Demonstration  Response: Verbalizes Understanding, Needs Reinforcement

## 2024-09-09 NOTE — PROGRESS NOTES
9/9/24:  Spoke with patient and his wife bedside, introduced self and explained role.  Patient lives with his wife in a split level home.  They are in the process of moving into [facility]  Half of their belongings are moved into the [facility] apartment, but patient's wife still residing/sleeping at their old home. They have 4 children, 3 of which live in the area and one in [place].  Their son Issac is the main contact amongst the patient's children.  Prior to admission the patient was independent and driving.  He used a walker outside the home mostly for SOB.  No assistive device inside the home.  Discussed ELOS depends on progress and goals, will continue to update regarding DC planning.  -Alex Sherman RN    9/12/24:  Spoke with patient and his his wife bedside and discussed DC plan.  Explained that patient will need physical assist at time of discharge from rehab, which patient's wife can not provide. Discussed options at DC including assisted living and SNF.  Offered to call patient's son/daughter to update on progress and DC plan, patient's wife declined at this time. -Alex Sherman RN    9/16/24:  Patient transferred to acute. -Alex Sherman RN

## 2024-09-09 NOTE — PROGRESS NOTES
Speech-Language Pathology    SLP Adult IRF Speech-Language Cognition    Patient Name: Regulo Marques  MRN: [id number]  Today's Date: 2024   Time Calculation  Start Time: 1000  Stop Time: 1045  Time Calculation (min): 45 min     SLP Assessment:  SLP Assessment  SLP Assessment Results: Motor Speech Deficits, Cognitive Deficits  Prognosis: Fair  Treatment Tolerance: Patient tolerated treatment well  Medical Staff Made Aware: Yes  Strengths: Motivation  Barriers: Cognition, Comorbidities    Cognitive Linguistic Quick Test (CLQT) administered this date. Task scores and Severity Ratings as indicated:    Task Scores  Personal Fact:  6  Symbol Cancellation: 0  Confrontation Namin  Clock Drawin  Story Retelling: 3  Symbol Trails: 2  Generative Naming: 3  Design memory: 3  [place]: 4  Design Generation: 0    COMPOSITE SEVERITY RANGE  1.8 (moderate)    Patient presents with a moderate cognitive linguistic deficits characterized by: severely impaired attention and moderate impairments in memory, executive function, language, visuospatial and clock drawing skills.  Patient was provided with amplification head phones d/t severe [place] status; reports able to hear therapist well with amplification.  Impulsive throughout evaluation with cues to listen to directives prior to initiating task.      Intermittent periods of dysarthria more frequent during conversational speech.  Overall fairly intelligible.  Patient endorses that his speech sounds different to him compared to baseline.    SLP Plan:  Plan  Inpatient/Swing Bed or Outpatient: Inpatient  SLP Frequency: 5x per week  Duration: 2 weeks  SLP Discharge Recommendations: Home SLP  Discussed POC: Patient, Physician  Discussed Risks/Benefits: Yes    SPEECH LTG:   Patient will demonstrate improved speech production for communication in all settings at a independent level of care.  SPEECH STGs  1.  Pt will complete multisyllabic words and self generated sentences with use of  trained strategies  with 100% accuracy.   2. Pt will complete varying oral motor tasks of increasing repetitions/complexity with 100 % accuracy.   3.  Pt will utilize various speech strategies to improve intelligibility to 100%  at the conversational speech level.  4.  Patient/family education    COGNITIVE LT. Patient will demonstrate improved cognition for return home at a min assist level of care.  STGs COGNITION/VISION:  1. pt will complete low level attention and left attention  tasks with 80% accuracy and no cues.  2. Pt will complete low level organization tasks with 80 % accuracy and min cues.  4. pt will complete divergent reasoning tasks/thought organization tasks with 80 % accuracy  5.  Patient will complete functional sequencing tasks with 100 % accuracy  6. Patient will complete low level problem solving tasks with 80% acccuracy with minimal cues     Subjective   Current Problem:  Dysarthria and cognitive deficits    Most Recent Visit:  SLP Most Recent Visit  SLP Received On: 24    General Visit Information:  General Information  Chart Reviewed: Yes  Referred By: Dr. Garcia  Past Medical History Relevant to Rehab: A-fib, CHF, DM, HTN, diverticulitis, THR, right carpal tunnel  Prior to Session Communication: Bedside nurse    H/P copied from EMR:  Regulo Marques is a 83 y.o. male presenting to the ER with acute onset of left sided weakness, expressive aphasia, dysarthria, mental status change, and slurred speech. Pt. Was out to dinner with his wife tonight when these symptoms developed. He was apparently in his normal state of health prior to dinner. He was taking Eliquis for A-Fib but was taken off of the Eliquis on 2024 2/2 an admission at [facility] for a GI bleed. Pt. With no prior hx. Of stroke.     Objective     Pain:  Pain Assessment  Pain Assessment: 0-10  0-10 (Numeric) Pain Score: 0 - No pain    Cognition:  Cognition  Overall Cognitive Status: Impaired  Impulsive with L  inattention.     Motor Speech Production:  Motor Speech Production  Oral Motor : Impaired  Intermittent slurred speech    Inpatient:  Education Documentation  Education provided as it relates to rationale for evaluation and POC   Individual(s) Educated: Patient  Verbal Education : yes  Patient/Caregiver Demonstrated Understanding: yes  Plan of Care Discussed and Agreed Upon: yes

## 2024-09-09 NOTE — ASSESSMENT & PLAN NOTE
-Hold anticoagulation at this time until clarification from family.  -Neurology was to start Eliquis.  Will connect the teams.  -Will obtain GI input if indicated.

## 2024-09-09 NOTE — PROGRESS NOTES
Speech-Language Pathology    SLP Adult IRF Speech-Language Pathology Clinical Swallow Evaluation    Patient Name: Regulo Marques  MRN: [id number]  Today's Date: 9/9/2024   Time Calculation  Start Time: 0730  Stop Time: 0800  Time Calculation (min): 30 min     Recommendations:  Solid Diet Recommendations : Pureed/extremely thick  (IDDSI Level 4)  Liquid Diet Recommendations: Nectar thick/mildly thick (IDDS Level 2)  Compensatory Swallowing Strategies: Decrease distractions during eating/feeding, Upright 90 degrees as possible for all oral intake, Alternate solids and liquids, No straws, Single sips, Small bites/sips, Eat/feed slowly, Place bolus on left, Check for pocketing of food  Medication Administration Recommendations: Crushed, With Pureed    Assessment:  Prognosis: Good  Treatment Tolerance: Patient tolerated treatment well  Medical Staff Made Aware: Yes  Strengths: Motivation  Barriers: Cognition, Comorbidities  Mild oropharyngeal stage dysphagia characterized by impaired consistent oral containment of bolus with loss on the L, oral residuals with need for liquid wash, intermittent cough and throat clears during mildly thick liquid boluses.    Plan:  Inpatient/Swing Bed or Outpatient: Inpatient  Treatment/Interventions: Bolus trials, Assess diet tolerance, Diet recommendations, Oral motor exercises, Pharyngeal exercises, Patient/family education  SLP Plan: Skilled SLP  SLP Frequency: 5x per week  Duration: 2 weeks  SLP Discharge Recommendations: Home SLP  Discussed POC: Patient  Discussed Risks/Benefits: Yes    DYSPHAGIA LTG:   Patient will tolerate the LRD without clinical s/s of dysphagia    STGs DYSPHAGIA   1. Patient will tolerate prescribed diet with no overt s/s of aspiration in 100% of observed meals.  2. Patient will implement safe swallowing strategies to reduce risk of aspiration in with 100% of trials given minimal cues.  3.  Patient will tolerate trials of advanced textures and/or liquid  consistencies with SLP only for possible advancement as indicated given bedside and clinical inidicators  4.  Patient will complete pharyngeal and oral motor strengthening exercises with minimal cues.  5.  Patient/family education.    Subjective   Current Problem:  Dysphagia    General Visit Information:  Referred By: Dr. Garcia  Past Medical History Relevant to Rehab: A-fib, CHF, DM, HTN, diverticulitis, THR, right carpal tunnel  Prior Level of Function: WFL  Prior to Session Communication: Bedside nurse  Reviewed Procedures and Risks: Yes  Date of Onset: 09/02/24  [employer] Diet: Regular thin  Current Diet : puree/mild  Dysphagia Diagnosis: Mild oral stage dysphagia, Mild pharyngeal stage dysphagia    MBS completed 9/6/24 while in acute care.  Results as follow:  Mechanics of the Swallow Summary:  ORAL PHASE:  Lip Closure - Escape progressing to mid-chin  x1 during tsp sips. Left labial weakness.    Tongue Control During Bolus Hold - did not test  Bolus prep/mastication - did not test  Bolus transport/lingual motion - Brisk tongue motion for A-P movement of the bolus   Oral residue - Trace residue lining oral structures      PHARYNGEAL PHASE:  Initiation of pharyngeal swallow - Bolus head at vallecular pit   Soft palate elevation - No bolus between soft palate/pharyngeal wall   Laryngeal elevation - Complete superior movement of thyroid cartilage with contact of arytenoids to epiglottic petiole   Anterior hyoid excursion - Complete anterior movement   Epiglottic movement - Partial inversion  Laryngeal vestibule closure - Incomplete - narrow column of air/contrast in laryngeal vestibule   Pharyngeal stripping wave - Complete  Pharyngeal contraction (A/P view) - Not tested       Pharyngoesophageal segment opening - Partial distension/partial duration with partial obstruction of flow of bolus   Tongue base retraction - Trace column of contrast or air between tongue base and pharyngeal wall   Pharyngeal residue -  "Collection of residue within or on the pharyngeal structures  noted pyriform residue after straw sips only. Trace valleculae residue with all consistencies.      ESOPHAGEAL PHASE:  Esophageal clearance - Not evaluated      SLP Impressions with Severity Rating:   Mild oral and pharyngeal phases of the swallow, see impairments noted above \"Mechanics of the Swallow\"      -Reduced BOT retraction, reduced epiglottic inversion, and incomplete laryngeal vestibular closure result in trace amount of aspiration with thin liquids.   -There is no significant retained pharyngeal residue after the swallow.    -No aspiration with thick liquids consistency, both nectar and honey.    -No aspiration seen with puree barium.   -All consistencies were presented by tsp, with the exception of x1 straw sip of nectar, which resulted in the bolus advancing to the pyriforms prior to swallow onset, delay in swallow onset, with eventual swallow, clearing the bolus from the pharynx, and deep laryngeal penetration during the swallow that clears. Therefore, do not use straws.      Silent aspiration on thin liquids; penetration cleared during mildly and moderately thick liquids.    Objective   Pain:  Pain Assessment: 0-10  0-10 (Numeric) Pain Score: 0 - No pain     Oral/Motor Assessment:  Dentition: Dentures (Upper Partial )  Oral Motor: Impaired Function  L facial asymmetry, decreased lingual strength and ROM, decreased labial strength on the L.    Consistencies Trialed:  Consistencies Trialed: Yes  Consistencies Trialed: Nectar thick/mildly thick (IDDSI Level 2) - Cup, Pureed/extremely thick (IDDSI Level 4)  Patient evaluated at a.m. meal.    Clinical Observations:  Patient Positioning: Upright in Chair  Management of Oral Secretions: Adequate  Signs/Symptoms of Aspiration: Cough, Throat Clearing  Overt Signs or Symptoms of Aspiration: Nectar Thick/Mildly Thick (IDDSI Level 2) - Cup  Anterior Spillage: Pureed/Extremely Thick (IDDSI Level 4), " "Nectar Thick/Mildly Thick (IDDSI Level 2) - Cup  Oral Residue: Pureed/Extremely Thick (IDDSI Level 4)  Loss of bolus on the L with cues required for oral placement.  Intermittent cough and throat clear with patient slightly impulsive and taking liquids by cup with consecutive sips.    Inpatient:  Education Documentation    Education:  Learner patient    Barriers to Learning cognitive limitations barrier   Method verbal; written- (Written \"Swallowing Guidelines\" posted at patient's bedside)   Education - Topic ST provided patient education regarding role of ST, purpose of assessment, clinical impressions, goals of treatment, and plan of care. Education will be reinforced. ST further coordinated with RN regarding recommendations and precautions per this assessment, with RN verbalizing understanding.     Outcome    Verbalized understanding and agreement; needs review/reinforcement                      "

## 2024-09-09 NOTE — CARE PLAN
The patient's goals for the shift include  increase strength     The clinical goals for the shift include remain HD stable    Over the shift, the patient did not make progress toward the following goals. Barriers to progression include weakness. Recommendations to address these barriers include therapy services.

## 2024-09-09 NOTE — ASSESSMENT & PLAN NOTE
-History of GI bleed.  -Will continue to follow H&H and treat as indicated  -Await starting anticoagulation until stable

## 2024-09-09 NOTE — CONSULTS
"Nutrition Initial Assessment:   Nutrition Assessment    Reason for Assessment: Admission nursing screening (acute rehab  protocol; MST=2 for weight loss)    Patient is a 83 y.o. male presenting with CVA    PmHx: GIB, DM, UTI, HTN, Barretts esophagus, diverticulitis, prostate cancer, expressive aphasia, colon cancer s/p colectomy, multiple SBO, unresectable rectal cancer s/p chemo and radiation    Nutrition History:  Energy Intake: Poor < 50 %  Food and Nutrient History: Pt was out of room working with therapy at time of attempted visit today.  Pt was able to start on Pureed diet with mildly thickened liquids.  Poor PO intakes after review of EMR/flow sheets.  Vitamin/Herbal Supplement Use: none noted  Food Allergies/Intolerances:  None  GI Symptoms:  LYNDA   Oral Problems: Chewing difficulty and Swallowing difficulty       Anthropometrics:  Height: 175.3 cm (5' 9.02\")   Weight: 93 kg (205 lb 0.4 oz)   BMI (Calculated): 30.26  IBW/kg (Dietitian Calculated): 72.7 kg  Percent of IBW: 128 %       Weight History:     Weight Change %:  Weight History / % Weight Change: 93.2kg  Significant Weight Loss: No    Nutrition Focused Physical Exam Findings:  defer: not available   Subcutaneous Fat Loss:   Orbital Fat Pads: Defer  Muscle Wasting:     Edema:  Edema Location: NP BLE  Physical Findings:  Skin: Negative    Nutrition Significant Labs:  CBC Trend:   Results from last 7 days   Lab Units 09/09/24  0702 09/04/24  0541 09/03/24  1357 09/02/24 2017   WBC AUTO x10*3/uL 6.9 8.2 8.4 8.1   RBC AUTO x10*6/uL 2.68* 2.39* 2.49* 2.44*   HEMOGLOBIN g/dL 8.4* 7.5* 7.9* 7.7*   HEMATOCRIT % 27.8* 24.1* 24.8* 24.5*   MCV fL 104* 101* 100 100   PLATELETS AUTO x10*3/uL 184 197 193 190    , BMP Trend:   Results from last 7 days   Lab Units 09/09/24  0702 09/04/24  0540 09/03/24  1357 09/02/24 2017   GLUCOSE mg/dL 106* 115* 125* 209*   CALCIUM mg/dL 9.2 8.9 9.0 8.8   SODIUM mmol/L 145 142 140 139   POTASSIUM mmol/L 4.1 4.2 4.2 4.1   CO2 " mmol/L 24 20* 20* 18*   CHLORIDE mmol/L 115* 115* 111* 108*   BUN mg/dL 32* 50* 57* 60*   CREATININE mg/dL 1.51* 2.36* 2.67* 3.07*    , A1C:  Lab Results   Component Value Date    HGBA1C 5.7 (H) 09/02/2024   , BG POCT trend:   Results from last 7 days   Lab Units 09/09/24  1105 09/09/24  0656 09/08/24  1642 09/08/24  1114 09/08/24  0609   POCT GLUCOSE mg/dL 133* 96 131* 117* 103*        Nutrition Specific Medications:  Reviewed     I/O:   Last BM Date: 09/03/24;      Dietary Orders (From admission, onward)       Start     Ordered    09/09/24 1203  Oral nutritional supplements  Until discontinued        Question Answer Comment   Deliver with Breakfast    Deliver with Dinner    Deliver with Lunch    Select supplement: Mighty Shake        09/09/24 1202    09/09/24 1202  Oral nutritional supplements  Until discontinued        Question Answer Comment   Deliver with Lunch    Deliver with Dinner    Select supplement: Magic Cup        09/09/24 1202    09/08/24 1553  Adult diet Regular; Pureed 4; Mild thick 2  Diet effective now        Question Answer Comment   Diet type Regular    Texture Pureed 4    Fluid consistency Mild thick 2        09/08/24 1552                     Estimated Needs:      Method for Estimating Needs: 1700-1900kcals (18-20kcals/kg ABW)     Method for Estimating Needs: 74-93g (0.8-1.0g/kg ABW)     Method for Estimating Needs: 1 mL/kcal or as per MD        Nutrition Diagnosis   Malnutrition Diagnosis  Patient has Malnutrition Diagnosis:  (unable to determine at this time)    Nutrition Diagnosis  Patient has Nutrition Diagnosis: Yes  Diagnosis Status (1): New  Nutrition Diagnosis 1: Inadequate oral intake  Related to (1): acute illness, new CVA  As Evidenced by (1): 0-33% of meals  Additional Nutrition Diagnosis: Diagnosis 2  Diagnosis Status (2): Ongoing  Nutrition Diagnosis 2: Increased nutrient needs  Related to (2): physiological causes increasing nutrient needs  As Evidenced by (2): unresectable rectal  cancer s/p chemo and radiation; acute rehab demands with increase in physical activity       Nutrition Interventions/Recommendations         Nutrition Prescription:  Individualized Nutrition Prescription Provided for : Pureed diet with mildly thickened liquids; Will order Mighty shakes with all meals and Magic cup twice daily        Nutrition Interventions:   Interventions: Meals and snacks, Medical food supplement  Meals and Snacks: Texture-modified diet, Fluid-modified diet  Goal: consume >50->75% of meals  Medical Food Supplement: Commercial beverage, Commercial food  Goal: consume >75% of Mighty shakes with all meals (for an additional 220 kcals, 6 gm protein each)  Additional Interventions: consume >75% of Magic cup twice daily (for an additional 290 kcals, 9 gm protein each)         Nutrition Education:   N/A       Nutrition Monitoring and Evaluation   Food/Nutrient Related History Monitoring  Monitoring and Evaluation Plan: Energy intake, Fluid intake, Amount of food  Energy Intake: Estimated energy intake  Criteria: Meal/ONS intake to meet >75% of estimated needs  Fluid Intake: Estimated fluid intake  Criteria: fluid intake to meet >75% of estimated need  Amount of Food: Estimated amout of food, Medical food intake  Criteria: Pt to consume >75% of meals/ONS    Body Composition/Growth/Weight History  Monitoring and Evaluation Plan: Weight  Weight: Measured weight  Criteria: reweigh at least every 5 days    Biochemical Data, Medical Tests and Procedures  Monitoring and Evaluation Plan: Electrolyte/renal panel, Glucose/endocrine profile  Criteria: labs WNL  Criteria: BG within acceptable range    Nutrition Focused Physical Findings  Monitoring and Evaluation Plan: Digestive System  Other: BM at least every 2-3 days         Time Spent (min): 45 minutes

## 2024-09-09 NOTE — ASSESSMENT & PLAN NOTE
-Start 3 hours of PT and OT daily to improve functional ability and need AD ADLs.  -Start speech therapy for swallowing cognition  -Will discuss with family regards stroke prophylaxis and starting of anticoagulation due to recent GI bleed.  -Will follow-up with GI.  -Continue statin  -Continue blood pressure management, add medications as appropriate  Estimated length of stay is 20 days discharge with intermittent assist.

## 2024-09-09 NOTE — PROGRESS NOTES
Occupational Therapy    OT Treatment    Patient Name: Regulo Marques  MRN: [id number]  Today's Date: 9/9/2024    Assessment:  End of Session Communication: Bedside nurse  End of Session Patient Position: Up in chair, Alarm on     Plan:  Treatment Interventions: ADL retraining, Endurance training, Patient/family training, Neuromuscular reeducation, Functional transfer training, Visual perceptual retraining, UE strengthening/ROM, Cognitive reorientation  OT Frequency:  (5 days/week, 90 mins/day, for 2 weeks)  OT Discharge Recommendations:  (anticipate need for up to mod assist with transfers, ADLs, and mobility)  Equipment Recommended upon Discharge: Wheeled walker, Bedside commode (tub bench, reacher)  Treatment Interventions: ADL retraining, Endurance training, Patient/family training, Neuromuscular reeducation, Functional transfer training, Visual perceptual retraining, UE strengthening/ROM, Cognitive reorientation    Subjective   Previous Visit Info:  OT Last Visit  OT Received On: 09/09/24  General:  General  Prior to Session Communication: Bedside nurse  Patient Position Received: Alarm on, Bed, 3 rail up  General Comment: Patient very [place]. Patient is impulsive despite education and cues throughout for slowed pace and following instructions. Agreeable to OT. Noted LUE edematous- elevated on pillow, pt with L inattention and requires frequent cues for LUE to remain inside of chair. L lean noted- cues for midline awareness. (CATHETER)  Precautions:  Hearing/Visual Limitations: [place]  Medical Precautions: Fall precautions, Swallowing precautions (Left inattention)  Pain:  Pain Assessment  Pain Assessment: 0-10  0-10 (Numeric) Pain Score: 0 - No pain    Objective    Cognition:  Cognition  Safety/Judgement: Exceptions to WFL (Patient impulsive and does not follow directions at times for safety.)  Impulsive: Severely (Depsite cues and education prior to each task.)    Activities of Daily Living: Grooming  Grooming Where  Assessed: Wheelchair, Sitting sinkside  Grooming Comments: Patient washes face once soap applied to bilat hands- R washing L with SBA, washes face with wash rag once soap applied with SBA, brushes teeth with assistance to open and squeeze toothpaste onto toothbrush and brushes, rinses with supervision. [place] hair with supervision using RUE.    Toileting  Toileting Comments: Dependent for hygiene and clothing mgmt after BM as pt unable to safely assist d/t balance, L lean, and non functional LUE.    Bed Mobility/Transfers: Bed Mobility  Bed Mobility: Yes  Bed Mobility 1  Bed Mobility Comments 1: Supine to sitting with Max assist x1 to perform safely with use of rail. Does not follow sequencing cues. Noted some tremors sitting edge of bed however patient reports feeling cold.    Transfers  Transfer: Yes  Transfer 1  Trials/Comments 1: Sit to stand transfer pulling from bed with Mod assist x2 initially, L lean, shaky.    Toilet Transfers  Toilet Transfers Comments: Commode swap with Mod-Min assist x2 for standing balance at bed rail and assist of another for swapping commode and wheelchair. L lean and assistance with placement of LUE onto bed rail and maintain .    Vision:  Vision Comments: Completed 12 piece puzzle placing shapes in matching spaces with 1 error only and able to self correct withoutcues. Patient places correct shapes in spaces however initially places incorrectly.   EDUCATION:  Education  Individual(s) Educated: Patient  Education Provided: POC discussed and agreed upon, Fall precautons, Diagnosis & Precautions  Patient/Caregiver Demonstrated Understanding: yes  Plan of Care Discussed and Agreed Upon: yes  Patient Response to Education: Patient/Caregiver Verbalized Understanding of Information  Education Comment: Patient declines any information at this time on intimacy/sex in regards to diagnosis.    Goals:  Encounter Problems       Encounter Problems (Active)       OT Problem       LTG - Patient  will complete eating with set up. (Progressing)       Start:  09/09/24    Expected End:  09/23/24            LTG - Patient will complete grooming with sba. (Progressing)       Start:  09/09/24    Expected End:  09/23/24            LTG - Patient will complete toileting with mod assist. (Progressing)       Start:  09/09/24    Expected End:  09/23/24            LTG - Patient will complete bathing with min/mod assist. (Progressing)       Start:  09/09/24    Expected End:  09/23/24            LTG - Patient will complete UE dressing with sba. (Progressing)       Start:  09/09/24    Expected End:  09/23/24            LTG - Patient will complete LE dressing using adaptive equip as needed with mod assist. (Progressing)       Start:  09/09/24    Expected End:  09/23/24            LTG - Patient will complete commode transfer via pivot or device as needed with mod assist. (Progressing)       Start:  09/09/24    Expected End:  09/23/24            LTG - Patient will complete tub/shower transfer using DME as needed with mod/max assist. (Progressing)       Start:  09/09/24    Expected End:  09/23/24            LTG - Patient will complete simple mobility using device as needed with mod assist. (Progressing)       Start:  09/09/24    Expected End:  09/23/24            LTG - Patient will complete wheelchair mobility with sba. (Progressing)       Start:  09/09/24    Expected End:  09/23/24            STG - Patient will complete grooming with min assist. (Progressing)       Start:  09/09/24    Expected End:  09/16/24            STG - Patient will complete toileting with mod assist x2. (Progressing)       Start:  09/09/24    Expected End:  09/16/24            STG - Patient will complete sponge bathing with mod/max assist. (Progressing)       Start:  09/09/24    Expected End:  09/16/24            STG - Patient will complete UE dressing with mod assist. (Progressing)       Start:  09/09/24    Expected End:  09/16/24            STG -  Patient will complete LE dressing using adaptive equip as needed with mod assist x2. (Progressing)       Start:  09/09/24    Expected End:  09/16/24            STG - Patient will complete commode transfer via device as needed with min assist x2. (Progressing)       Start:  09/09/24    Expected End:  09/16/24            STG - Patient will complete simple wheelchair mobility with min assist. (Progressing)       Start:  09/09/24    Expected End:  09/16/24

## 2024-09-09 NOTE — ASSESSMENT & PLAN NOTE
-Monitor heart rate and adjust medications as appropriate  -Pending decision for anticoagulation due to risk of GI bleed

## 2024-09-10 LAB
GLUCOSE BLD MANUAL STRIP-MCNC: 165 MG/DL (ref 74–99)
GLUCOSE BLD MANUAL STRIP-MCNC: 184 MG/DL (ref 74–99)
GLUCOSE BLD MANUAL STRIP-MCNC: 93 MG/DL (ref 74–99)

## 2024-09-10 PROCEDURE — 97129 THER IVNTJ 1ST 15 MIN: CPT | Mod: GN | Performed by: IN HOME SUPPORTIVE CARE

## 2024-09-10 PROCEDURE — 2500000001 HC RX 250 WO HCPCS SELF ADMINISTERED DRUGS (ALT 637 FOR MEDICARE OP): Performed by: PHYSICAL MEDICINE & REHABILITATION

## 2024-09-10 PROCEDURE — 82947 ASSAY GLUCOSE BLOOD QUANT: CPT

## 2024-09-10 PROCEDURE — 97130 THER IVNTJ EA ADDL 15 MIN: CPT | Mod: GN | Performed by: IN HOME SUPPORTIVE CARE

## 2024-09-10 PROCEDURE — 92526 ORAL FUNCTION THERAPY: CPT | Mod: GN | Performed by: IN HOME SUPPORTIVE CARE

## 2024-09-10 PROCEDURE — 99232 SBSQ HOSP IP/OBS MODERATE 35: CPT | Performed by: PHYSICAL MEDICINE & REHABILITATION

## 2024-09-10 PROCEDURE — 2500000002 HC RX 250 W HCPCS SELF ADMINISTERED DRUGS (ALT 637 FOR MEDICARE OP, ALT 636 FOR OP/ED): Performed by: PHYSICAL MEDICINE & REHABILITATION

## 2024-09-10 PROCEDURE — 97530 THERAPEUTIC ACTIVITIES: CPT | Mod: GO

## 2024-09-10 PROCEDURE — 97116 GAIT TRAINING THERAPY: CPT | Mod: GP

## 2024-09-10 PROCEDURE — 97112 NEUROMUSCULAR REEDUCATION: CPT | Mod: GO

## 2024-09-10 PROCEDURE — 97530 THERAPEUTIC ACTIVITIES: CPT | Mod: GP

## 2024-09-10 PROCEDURE — 1180000001 HC REHAB PRIVATE ROOM DAILY

## 2024-09-10 PROCEDURE — 97535 SELF CARE MNGMENT TRAINING: CPT | Mod: GO

## 2024-09-10 ASSESSMENT — PAIN SCALES - GENERAL
PAINLEVEL_OUTOF10: 0 - NO PAIN

## 2024-09-10 ASSESSMENT — COGNITIVE AND FUNCTIONAL STATUS - GENERAL
STANDING UP FROM CHAIR USING ARMS: TOTAL
DRESSING REGULAR LOWER BODY CLOTHING: A LOT
CLIMB 3 TO 5 STEPS WITH RAILING: TOTAL
HELP NEEDED FOR BATHING: A LOT
DRESSING REGULAR UPPER BODY CLOTHING: A LOT
TOILETING: TOTAL
PERSONAL GROOMING: A LOT
WALKING IN HOSPITAL ROOM: TOTAL
MOVING TO AND FROM BED TO CHAIR: TOTAL
STANDING UP FROM CHAIR USING ARMS: TOTAL
MOVING FROM LYING ON BACK TO SITTING ON SIDE OF FLAT BED WITH BEDRAILS: A LOT
MOVING FROM LYING ON BACK TO SITTING ON SIDE OF FLAT BED WITH BEDRAILS: A LITTLE
DAILY ACTIVITIY SCORE: 11
EATING MEALS: A LOT
TURNING FROM BACK TO SIDE WHILE IN FLAT BAD: A LITTLE
TURNING FROM BACK TO SIDE WHILE IN FLAT BAD: A LOT
MOBILITY SCORE: 10
MOVING TO AND FROM BED TO CHAIR: TOTAL
WALKING IN HOSPITAL ROOM: TOTAL
MOBILITY SCORE: 8
CLIMB 3 TO 5 STEPS WITH RAILING: TOTAL

## 2024-09-10 ASSESSMENT — PAIN - FUNCTIONAL ASSESSMENT
PAIN_FUNCTIONAL_ASSESSMENT: 0-10

## 2024-09-10 ASSESSMENT — PAIN SCALES - WONG BAKER: WONGBAKER_NUMERICALRESPONSE: NO HURT

## 2024-09-10 ASSESSMENT — ACTIVITIES OF DAILY LIVING (ADL): HOME_MANAGEMENT_TIME_ENTRY: 25

## 2024-09-10 NOTE — CARE PLAN
The patient's goals for the shift include      The clinical goals for the shift include Remain hemodynamically stable, get some quality rest for PT/OT in the AM.      Problem: Skin  Goal: Decreased wound size/increased tissue granulation at next dressing change  Outcome: Progressing  Flowsheets (Taken 9/9/2024 2116)  Decreased wound size/increased tissue granulation at next dressing change: Promote sleep for wound healing  Goal: Participates in plan/prevention/treatment measures  Outcome: Progressing  Flowsheets (Taken 9/9/2024 2116)  Participates in plan/prevention/treatment measures: Elevate heels  Goal: Promote/optimize nutrition  Outcome: Progressing  Flowsheets (Taken 9/9/2024 2116)  Promote/optimize nutrition: Monitor/record intake including meals  Goal: Promote skin healing  Outcome: Progressing  Flowsheets (Taken 9/9/2024 2116)  Promote skin healing:   Assess skin/pad under line(s)/device(s)   Protective dressings over bony prominences     Problem: Fall/Injury  Goal: Verbalize understanding of personal risk factors for fall in the hospital  Outcome: Progressing  Goal: Verbalize understanding of risk factor reduction measures to prevent injury from fall in the home  Outcome: Progressing  Goal: Use assistive devices by end of the shift  Outcome: Progressing  Goal: Pace activities to prevent fatigue by end of the shift  Outcome: Progressing     Problem: General Stroke  Goal: Establish a mutual long term goal with patient by discharge  Outcome: Progressing  Goal: Demonstrate improvement in neurological exam throughout the shift  Outcome: Progressing  Goal: Maintain BP within ordered limits throughout shift  Outcome: Progressing  Goal: Participate in treatment (ie., meds, therapy) throughout shift  Outcome: Progressing  Goal: Tolerate enteral feeding throughout shift  Outcome: Progressing  Goal: Controlled blood glucose throughout shift  Outcome: Progressing  Goal: Out of bed three times today  Outcome:  Progressing     Problem: ICU Stroke  Goal: Maintain ICP within ordered limits throughout shift  Outcome: Progressing  Goal: Tolerate EVD clamping trial throughout shift  Outcome: Progressing  Goal: Tolerate ventilator weaning trial during shift  Outcome: Progressing  Goal: Maintain patent airway throughout shift  Outcome: Progressing  Goal: Achieve/maintain targeted sodium level throughout shift  Outcome: Progressing

## 2024-09-10 NOTE — PROGRESS NOTES
Physical Therapy    Physical Therapy Treatment    Patient Name: Regulo Marques  MRN: [id number]  Today's Date: 9/10/2024  Time Calculation  Start Time: 0915  Stop Time: 1000  Time Calculation (min): 45 min         Assessment/Plan         PT Plan  Treatment/Interventions: Bed mobility, Transfer training, Gait training, Stair training, Balance training, Strengthening, Endurance training, Therapeutic activity, Therapeutic exercise, Neuromuscular re-education  PT Plan: Ongoing PT  PT Frequency: 5 times per week (6x prn)  PT Discharge Recommendations:  (Anticipate discharge home with the need for mod assist at the walker level and will recommend first floor set-up.)  Equipment Recommended upon Discharge: Wheeled walker  PT Recommended Transfer Status: Assist x2      General Visit Information:   PT  Visit  PT Received On: 09/10/24  General  Patient Position Received: Alarm on, Up in chair  General Comment: patient [place], requires directions to be repeated frequently and visual demonstration throughout session    Subjective   Precautions:  Precautions  Hearing/Visual Limitations: [place]  Medical Precautions: Fall precautions, Swallowing precautions  Precautions Comment: Pureed diet and mildly thick liquids      Objective   Pain:  Pain Assessment  Pain Assessment: 0-10  0-10 (Numeric) Pain Score: 0 - No pain     Treatments:  Therapeutic Activity  Therapeutic Activity Performed:  (Pt stood at boubacar-bar for 90 seconds x2 and 2 minutes x1 with mod assist x2.  Standing posture:  LLE adduction, buckling at LLE, trunk forward flexion, heavy left lean.)    Transfers  Transfer:  (Repeated sit it to stand transfer training by pulling up on boubacar-bar with mod assist x2.  Pivot transfer training (using one grab bar) to the left and right x3 reps each direction with mod assist x2 and max verbal and manual cues.)    EDUCATION:  Education Comment:  (Pt educated on proper standing posture and transfer technique with visual demonstration frequently  provided.  Pt responding better to single, simple, direct commands and instruction.)    Encounter Problems       Encounter Problems (Active)       PT Problem       STG - Bed mobility with max assist x1.   (Progressing)       Start:  09/09/24    Expected End:  09/16/24            STG - Transfers with mod assist x2. (Progressing)       Start:  09/09/24    Expected End:  09/16/24            STG - Pt will amb 20 ft in a str line with approp device and mod assist x2.   (Progressing)       Start:  09/09/24    Expected End:  09/16/24            STG - Will determine most approp means of entry into home.   (Progressing)       Start:  09/09/24    Expected End:  09/16/24            LTG - Bed mobility with mod assist x1. (Progressing)       Start:  09/09/24    Expected End:  09/23/24            LTG - Transfers with mod assist x1. (Progressing)       Start:  09/09/24    Expected End:  09/23/24            LTG - Pt will amb 50 ft with approp device and mod assist x1. (Progressing)       Start:  09/09/24    Expected End:  09/23/24            LTG - Will educate pt and family on most approp means of entry into home.   (Progressing)       Start:  09/09/24    Expected End:  09/23/24

## 2024-09-10 NOTE — PROGRESS NOTES
Speech-Language Pathology    SLP Adult IRF Speech-Language Pathology Treatment     Patient Name: Regulo Marques  MRN: [id number]  Today's Date: 9/10/2024  Time Calculation  Start Time: 1130  Stop Time: 1200  Time Calculation (min): 30 min    SLP Assessment:  SLP TX Intervention Outcome: Making Progress Towards Goals  SLP Assessment Results: Problem solving deficits, Cognitive Deficits  Prognosis: Good  Treatment Tolerance: Patient tolerated treatment well  Medical Staff Made Aware: Yes  Strengths: Motivation  DYSPHAGIA LTG: estabished 9/9/24   Patient will tolerate the LRD without clinical s/s of dysphagia; PROGRESSING      Plan:  Inpatient/Swing Bed or Outpatient: Inpatient  SLP Frequency: 5x per week  Duration: 2 weeks  SLP Discharge Recommendations: Home SLP  Discussed POC: Patient  Discussed Risks/Benefits: Yes  Continue POC established at initial evaluation 9/9/24  Recommend diet advancement this date to soft/bite size with mildly thick liquids    Subjective   Current Problem:  Dysphagia    Most Recent Visit:  SLP Received On: 09/10/24    General Visit Information:    Patient seated in recliner chair.  Cues needed for using L UE as gross assist d/t poor control and inattention.    Pain Assessment:   Pain Assessment: 0-10  0-10 (Numeric) Pain Score: 0 - No pain    Objective     Therapeutic Swallow:  Therapeutic Swallow Intervention : PO Trials, Compensatory Strategies  Solid Diet Recommendations: Soft & bite sized/chopped (IDDSI Level 6)  Liquid Diet Recommendations: Honey thick/liquidised/moderately thick (IDDS Level 3)  Reassessed swallow this date with soft and bite size diet items; adequate mastication and improved oral clearing.  Requires intermittent liquid wash for lingual residue but no significant pocketing noted.  No overt or subtle s/s of penetration and/or aspiration this date.    Inpatient:  Education Documentation  Education provided as it relates to POC and progress.  Individual(s) Educated:  Patient/Nursing  Verbal Education : yes  Written Education: yes; swallow strategies updated in room this date   Patient/Caregiver Demonstrated Understanding: yes  Plan of Care Discussed and Agreed Upon: yes

## 2024-09-10 NOTE — CONSULTS
83m pmh of remote colon ca; htn, lipids, obesity, bph and recent gu bleeding issues, afib, chf, recent hosp at  w/ugib (on eliquis); now hosp at [facility] w/acute r mca stroke; brought to acute rehab; no new issues/complaints; pt resting comfortably on ra at this time  Pmh: as above  Soch; prior comm dweller w/family; no etoh/tob  Famh; noncont  All/meds/labs reviewed  Exam nad irreg cta soft obese nt no sig edema  A/p: recent r mca stroke; improving l boubacar/dysarthia/dysphasia, anemia, afib/chf/htn/lipids, h/o gu bleeding/bph, et al  Cont present meds/mgmt; pt/ot; pt is a very poor anticoag candidate; quite dubious that the totality of the benefits of anticoag would outweigh the risks

## 2024-09-10 NOTE — PROGRESS NOTES
Physical Therapy    Physical Therapy Treatment    Patient Name: Rgeulo Marques  MRN: [id number]  Today's Date: 9/10/2024  Time Calculation  Start Time: 1045  Stop Time: 1130  Time Calculation (min): 45 min         Assessment/Plan         PT Plan  Treatment/Interventions: Bed mobility, Transfer training, Gait training, Stair training, Balance training, Strengthening, Endurance training, Therapeutic activity, Therapeutic exercise, Neuromuscular re-education  PT Plan: Ongoing PT  PT Frequency: 5 times per week (6x prn)  PT Discharge Recommendations:  (Anticipate discharge home with the need for mod assist at the walker level and will recommend first floor set-up.)  Equipment Recommended upon Discharge: Wheeled walker  PT Recommended Transfer Status: Assist x2      General Visit Information:   PT  Visit  PT Received On: 09/10/24  General  Patient Position Received: Alarm on, Up in chair  General Comment: patient [place], requires directions to be repeated frequently and visual demonstration throughout session    Subjective   Precautions:  Precautions  Hearing/Visual Limitations: [place]  Medical Precautions: Fall precautions, Swallowing precautions  Precautions Comment: Pureed diet and mildly thick liquids      Objective   Pain:  Pain Assessment  Pain Assessment: 0-10  0-10 (Numeric) Pain Score: 0 - No pain     Treatments:  Ambulation/Gait Training  Ambulation/Gait Training Performed:  (Pt amb 15 ft x 2 along hallway rail with mod A x2.  Assist needed to increase LLE abduction.  Pt also amb 20 ft x 2 in a straight line with mod/max arm A x2.  Gait form:  severe LLE adduction, marked left lean, trunk forward flexion, left foot drag.)    Transfers  Transfer:  (Sit to stand transfer training with pt pulling up on hallway rail with mod assist x2.  Pivot transfer training to the weak side with mod/max assist x2 due to severe left lean and difficulty advancing LLE.)    EDUCATION:  Education Comment:  (Pt educated on proper gait form  with visual demonstration frequently provided.)    Encounter Problems       Encounter Problems (Active)       PT Problem       STG - Bed mobility with max assist x1.   (Progressing)       Start:  09/09/24    Expected End:  09/16/24            STG - Transfers with mod assist x2. (Progressing)       Start:  09/09/24    Expected End:  09/16/24            STG - Pt will amb 20 ft in a str line with approp device and mod assist x2.   (Progressing)       Start:  09/09/24    Expected End:  09/16/24            STG - Will determine most approp means of entry into home.   (Progressing)       Start:  09/09/24    Expected End:  09/16/24            LTG - Bed mobility with mod assist x1. (Progressing)       Start:  09/09/24    Expected End:  09/23/24            LTG - Transfers with mod assist x1. (Progressing)       Start:  09/09/24    Expected End:  09/23/24            LTG - Pt will amb 50 ft with approp device and mod assist x1. (Progressing)       Start:  09/09/24    Expected End:  09/23/24            LTG - Will educate pt and family on most approp means of entry into home.   (Progressing)       Start:  09/09/24    Expected End:  09/23/24

## 2024-09-10 NOTE — PROGRESS NOTES
"  Regulo Marques is a 83 y.o. 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    Subjective   Patient seen today in his room eating lunch.  He was also being seen by speech therapy.  Diet was upgraded.  He is on a nectar thick liquids and seems to be tolerating that well.    Patient is aware of his prior history and his history of a GI bleed.    I did speak with the wife yesterday regarding his stroke prophylaxis and the concerns of being placed back on Eliquis because of his history of GI bleed and hematuria.  I did speak with Dr. Lupillo hill in regards to his recommendations and understanding.  He feels he is at high risk of recurrent bleed would not recommend Eliquis.  He did not have a GI consult while here but in the past has had an EGD at an outside hospital.  I did speak to the patient about this and we did talk about having GI evaluate him.    Patient continues to have a Buck in place.  Flomax was increased last night.  Functionally requiring mod assist of 2 for transfers and stood for 90 seconds.       Objective       Physical Exam  Pleasant male in no apparent stress.  He is alert and oriented x 3.  Clear to auscultation bilaterally  Cardiovascular S1-S2   Abdomen soft nontender nondistended with active bowel sounds.    Vascular congestion bilateral lower extremities but negative Homans bilaterally.  Mild left hemiparesis upper greater than lower extremity.  Some left inattention and left neglect.  Function: Patient is requiring mod assist of 2 for functional mobility.  Assessment/Plan        Last Recorded Vitals  Blood pressure 99/53, pulse (!) 47, temperature 36 °C (96.8 °F), resp. rate 18, height 1.753 m (5' 9.02\"), weight 93 kg (205 lb 0.4 oz), SpO2 98%.    Therapy notes from last 24 hours reviewed.    Relevant Results                  Scheduled medications  atorvastatin, 80 mg, oral, Nightly  insulin lispro, 0-5 Units, subcutaneous, " TID  sennosides, 1 tablet, oral, Nightly  tamsulosin, 0.8 mg, oral, Nightly      Continuous medications     PRN medications  PRN medications: acetaminophen **OR** acetaminophen, alum-mag hydroxide-simeth, bisacodyl, bisacodyl, melatonin     Results for orders placed or performed during the hospital encounter of 09/08/24 (from the past 24 hour(s))   POCT GLUCOSE   Result Value Ref Range    POCT Glucose 133 (H) 74 - 99 mg/dL   POCT GLUCOSE   Result Value Ref Range    POCT Glucose 93 74 - 99 mg/dL   POCT GLUCOSE   Result Value Ref Range    POCT Glucose 184 (H) 74 - 99 mg/dL                 Assessment/Plan   Principal Problem:    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  -Start speech therapy for swallowing cognition  -Will discuss with family regards stroke prophylaxis and starting of anticoagulation due to recent GI bleed.  -Will follow-up with GI.  -Continue statin  -Continue blood pressure management, add medications as appropriate  Estimated length of stay is 20 days discharge with intermittent assist.  CVA (cerebrovascular accident due to intracerebral hemorrhage) (Multi)  -Continue plan of care as noted above     Anemia  -History of GI bleed.  -Will continue to follow H&H and treat as indicated  -Await starting anticoagulation until stable     Chronic diastolic heart failure (Multi)  -Monitor current cardiac function.  Currently on no  medications for his cardiac dysfunction     Diabetes mellitus (Multi)  -Monitor blood sugars and adjust medications as indicated.     Essential hypertension, benign  -Monitor blood pressure and treat as indicated     History of malignant neoplasm of colon  -History of GI bleed.  Will follow-up with GI.  -Questionable pending anticoagulation     Longstanding persistent atrial fibrillation (Multi)  -Monitor heart rate and adjust medications as appropriate  -Pending decision for anticoagulation due to risk of GI bleed     Obesity (BMI 30-39.9)  -Monitor diet and continue supportive nutrition.  Atrial fibrillation (Multi)  -Hold anticoagulation at this time until clarification from family.  -Neurology was to start Eliquis.  Will connect the teams.  -Will obtain GI input if indicated.     Urinary retention  -on flomax  -look at TOV in the near future and plan of care with [facility]  -facilitate active BM  -increase moblity     Bowel/Bladder  -facilitate daily active BM   -continence of bladder per timed void schedule and rehab nursing  -check PVRs and treat appropriately     DVT prophylaxis  -SCDs and ambulation  -Chemoprophylaxis on hold due to risk of GI bleed.     FEN  -follow BMP and treat appropriately  -monitor oral intake daily     9/10  -Will consult GI for question of restarting Eliquis and risks  -Hold all anticoagulation at this time  -Bradycardic without current medication.  Continue to follow.  Patient asymptomatic  -Blood pressure on the lower end we will continue to monitor.  Avoid hypotension  -Increase Flomax due to urinary retention.  Look at trial of void every 6 hours [facility] with goal of successful voiding trial.  -Continue ongoing PT OT and speech therapy.      I spent 35 minutes in the professional and overall care of this patient.      Allison Díaz MD

## 2024-09-10 NOTE — CARE PLAN
The patient's goals for the shift include  increase strength.     The clinical goals for the shift include remain hemodynamically stable.   Over the shift, the patient did not make progress toward the following goals. Barriers to progression include ***. Recommendations to address these barriers include ***.

## 2024-09-10 NOTE — PROGRESS NOTES
Speech-Language Pathology    SLP Adult IRF Speech-Language Pathology Treatment     Patient Name: Regulo Marques  MRN: [id number]  Today's Date: 9/10/2024  Time Calculation  Start Time: 1000  Stop Time: 1045  Time Calculation (min): 45 min     SLP Assessment:  SLP TX Intervention Outcome: Making Progress Towards Goals  SLP Assessment Results: Problem solving deficits, Cognitive Deficits  Prognosis: Good  Treatment Tolerance: Patient tolerated treatment well  Medical Staff Made Aware: Yes  Strengths: Motivation    COGNITIVE LTG: established 9/9/24              1. Patient will demonstrate improved cognition for return home at a min assist level of care; PROGRESSING  DYSPHAGIA LTG: estabished 9/9/24   Patient will tolerate the LRD without clinical s/s of dysphagia; PROGRESSING     Plan:  Inpatient/Swing Bed or Outpatient: Inpatient  SLP Frequency: 5x per week  Duration: 2 weeks  SLP Discharge Recommendations: Home SLP  Discussed POC: Patient  Discussed Risks/Benefits: Yes  Continue POC established at evaluation 9/9/24    Subjective   Current Problem:  Dysphagia/dysarthria/cognitive deficits    Most Recent Visit:  SLP Received On: 09/10/24    General Visit Information:    Pleasant and cooperative.  Did not use amplifier this date but appeared to hear me better; may be related to improved attention.    Pain Assessment:   Pain Assessment: 0-10  0-10 (Numeric) Pain Score: 0 - No pain      Objective   Therapeutic Swallow:  Therapeutic Swallow Intervention : Pharyngeal Strengthening Techniques, Oral Strengthening Techniques  Chin tuck against resistance completed for 2 sets of 15.  Consuming mildly thick liquids during exercises with no over or subtle s/s of penetration and/or aspiration.  Labial strengthening exercises including audible kiss and cheek implosion x 30 each.  Improved labial strength evident.    Cognitive Linguistics:  Cognitive Linguistics Interventions: Sequencing, Problem solving  Odd man out task completed  with 100% accuracy; good rationales provided.  Functional sequencing on Ipad with therapist manipulating steps for patient with 93% accuracy.    Inpatient:  Education Documentation  Education provided as it relates to results of testing, POC and progress.  Individual(s) Educated: Patient  Verbal Education : yes  Patient Demonstrated Understanding: yes  Plan of Care Discussed and Agreed Upon: yes

## 2024-09-10 NOTE — PROGRESS NOTES
Occupational Therapy    OT Treatment    Patient Name: Regulo Marques  MRN: [id number]  Today's Date: 9/10/2024  Time Calculation  Start Time: 1300  Stop Time: 1345  Time Calculation (min): 45 min        Assessment:  End of Session Communication: Bedside nurse  End of Session Patient Position: Alarm on (recliner)     Plan:  Treatment Interventions: ADL retraining, Endurance training, Patient/family training, Neuromuscular reeducation, Functional transfer training, Visual perceptual retraining, UE strengthening/ROM, Cognitive reorientation  OT Frequency:  (5 days/week, 90 mins/day, for 2 weeks)  OT Discharge Recommendations:  (anticipate need for up to mod assist with transfers, ADLs, and mobility)  Equipment Recommended upon Discharge: Wheeled walker, Bedside commode (tub bench, reacher)  Treatment Interventions: ADL retraining, Endurance training, Patient/family training, Neuromuscular reeducation, Functional transfer training, Visual perceptual retraining, UE strengthening/ROM, Cognitive reorientation    Subjective   Previous Visit Info:  OT Last Visit  OT Received On: 09/10/24    General:  General  Prior to Session Communication: Bedside nurse  Patient Position Received: Alarm on (recliner)  General Comment: patient [place], requires directions to be repeated frequently and visual demonstration throughout session  Precautions:  Medical Precautions: Fall precautions, Swallowing precautions  Precautions Comment: Pureed diet and moderately thick liquids    Pain:  Pain Assessment  Pain Assessment: 0-10  0-10 (Numeric) Pain Score: 0 - No pain    Objective      Bed Mobility/Transfers: Transfers  Transfer:  (stand pivot transfer from recliner to  towards non-affected right side with mod assist x2; stand-pivot transfer towards affected left side requiring increased assist, up to mod/max assist x2, heavy left lean and difficulty advancing feet)    Transfer 1  Transfer From 1:  sit to stand transfers from wheelchair, pulling up  from boubacar bar, with mod/max assist x1, cues for safe technique; contnues to be impulsive    Standing Balance:  Static Standing Balance  Static Standing-Comment/Number of Minutes: static standing at boubacar bar for 2:07 and 2:30 with mod/max assist x1; mod cues throughout for improved posture and to maintain midline       Therapy/Activity: Balance/Neuromuscular Re-Education  Balance/Neuromuscular Re-Education Activity Performed: Bilateral ue oneida exercises, weighted with 2 lbs, at tabletop 30 reps with supervision; bilateral oneida exercises on an incline x30 reps with supervision, increased time/difficulty to complete.    Balance/Neuromuscular Re-Education Activity 1: picks up bean bags from table with left hand, places bean bags on incline board, then uses bilateral oneida to push bean bags off board - overall mod difficulty with grasping bean bags and maintaining left grasp on handle of oneida; noted left inattention and decreased sensation (frequently dropping bean bags and unaware)    Balance/Neuromuscular Re-Education Activity 2: left hand exerciser (moderate resistance) 10 squeezes x 2 sets with mod difficulty to fully squeeze; dropping hand exerciser several times during task secondary to inattention and impaired sensation    EDUCATION:  Education  Individual(s) Educated: Patient  Education Provided:  (safe transfer techniques; techniques to improve left attention and LUE function)  Patient Response to Education:  (patient with fair understanding and follow through; will benefit from further education)    Goals:  Encounter Problems       Encounter Problems (Active)       OT Problem       LTG - Patient will complete eating with set up. (Progressing)       Start:  09/09/24    Expected End:  09/23/24            LTG - Patient will complete grooming with sba. (Progressing)       Start:  09/09/24    Expected End:  09/23/24            LTG - Patient will complete toileting with mod assist. (Progressing)       Start:   09/09/24    Expected End:  09/23/24            LTG - Patient will complete bathing with min/mod assist. (Progressing)       Start:  09/09/24    Expected End:  09/23/24            LTG - Patient will complete UE dressing with sba. (Progressing)       Start:  09/09/24    Expected End:  09/23/24            LTG - Patient will complete LE dressing using adaptive equip as needed with mod assist. (Progressing)       Start:  09/09/24    Expected End:  09/23/24            LTG - Patient will complete commode transfer via pivot or device as needed with mod assist. (Progressing)       Start:  09/09/24    Expected End:  09/23/24            LTG - Patient will complete tub/shower transfer using DME as needed with mod/max assist. (Progressing)       Start:  09/09/24    Expected End:  09/23/24            LTG - Patient will complete simple mobility using device as needed with mod assist. (Progressing)       Start:  09/09/24    Expected End:  09/23/24            LTG - Patient will complete wheelchair mobility with sba. (Progressing)       Start:  09/09/24    Expected End:  09/23/24            STG - Patient will complete grooming with min assist. (Progressing)       Start:  09/09/24    Expected End:  09/16/24            STG - Patient will complete toileting with mod assist x2. (Progressing)       Start:  09/09/24    Expected End:  09/16/24            STG - Patient will complete sponge bathing with mod/max assist. (Progressing)       Start:  09/09/24    Expected End:  09/16/24            STG - Patient will complete UE dressing with mod assist. (Progressing)       Start:  09/09/24    Expected End:  09/16/24            STG - Patient will complete LE dressing using adaptive equip as needed with mod assist x2. (Progressing)       Start:  09/09/24    Expected End:  09/16/24            STG - Patient will complete commode transfer via device as needed with min assist x2. (Progressing)       Start:  09/09/24    Expected End:  09/16/24             STG - Patient will complete simple wheelchair mobility with min assist. (Progressing)       Start:  09/09/24    Expected End:  09/16/24

## 2024-09-10 NOTE — CARE PLAN
The patient's goals for the shift include  gain strength.     The clinical goals for the shift include remain hemodynamically stable.

## 2024-09-10 NOTE — PROGRESS NOTES
Occupational Therapy    OT Treatment    Patient Name: Regulo Marques  MRN: [id number]  Today's Date: 9/10/2024  Time Calculation  Start Time: 0830  Stop Time: 0915  Time Calculation (min): 45 min        Assessment:  End of Session Communication:  (handoff to PT)  End of Session Patient Position: Alarm on, Up in chair     Plan:  Treatment Interventions: ADL retraining, Endurance training, Patient/family training, Neuromuscular reeducation, Functional transfer training, Visual perceptual retraining, UE strengthening/ROM, Cognitive reorientation  OT Frequency:  (5 days/week, 90 mins/day, for 2 weeks)  OT Discharge Recommendations:  (anticipate need for up to mod assist with transfers, ADLs, and mobility)  Equipment Recommended upon Discharge: Wheeled walker, Bedside commode (tub bench, reacher)  Treatment Interventions: ADL retraining, Endurance training, Patient/family training, Neuromuscular reeducation, Functional transfer training, Visual perceptual retraining, UE strengthening/ROM, Cognitive reorientation    Subjective   Previous Visit Info:  OT Last Visit  OT Received On: 09/10/24  General:  General  Prior to Session Communication: Bedside nurse  Patient Position Received: Alarm on, Up in chair  General Comment: patient [place], requires directions to be repeated frequently throughout session; pleasant and agreeable to therapy  Precautions:  Medical Precautions: Fall precautions, Swallowing precautions (dysphagia)    Vital Signs (Past 2hrs)                 Pain:  Pain Assessment  Pain Assessment: 0-10  0-10 (Numeric) Pain Score: 0 - No pain    Objective         Activities of Daily Living: Grooming  Grooming Where Assessed: Sitting sinkside  Grooming Comments: completes oral care with sba and assist for set up; cues to use affected left hand as a gross assist while right hand unscrews toothpaste cap, with patient still requiring max assist to complete even despite cues - apraxic; brushes teeth, inserts/removes dentures  with sba     shaves and washes face with sba; assist for set up (applying soap onto washcloth); mod cues throughout grooming tasks to maintain midline in sitting, frequently leaning to left    bush hair using right hand with sba; attempts to comb left side of head with left hand - patient able to maintain  on comb however requiring max assist secondary to limited shoulder flexion     UE Dressing  UE Dressing Comments: max assist to don zip-up sweatshirt, step by step cues for technique, apraxia noted, cues to don sleeve on affected LUE first        Therapy/Activity: Balance/Neuromuscular Re-Education  Balance/Neuromuscular Re-Education Activity Performed:    LUE grasp/release of large cones with min/mod difficulty overall    reaches in all planes for rings with LUE and places on vertical rafita with mod difficulty for coordination/targeting rings with LUE        Education Documentation  Handouts, taught by Alicia Tobin OT at 9/9/2024 11:18 AM.  Learner: Patient  Readiness: Acceptance  Method: Explanation  Response: Verbalizes Understanding, Needs Reinforcement    Home Exercise Program, taught by Alicia Tobin OT at 9/9/2024 11:18 AM.  Learner: Patient  Readiness: Acceptance  Method: Explanation  Response: Verbalizes Understanding, Needs Reinforcement    ADL Training, taught by Alicia Tobin OT at 9/9/2024 11:18 AM.  Learner: Patient  Readiness: Acceptance  Method: Explanation  Response: Verbalizes Understanding, Needs Reinforcement    Education Comments  No comments found.        OP EDUCATION:  Education  Individual(s) Educated: Patient  Education Provided:  (boubacar techniques/compensatory techniques for ADLs)  Patient Response to Education:  (patient with fair understanding and follow through; will benefit from further education)    Goals:  Encounter Problems       Encounter Problems (Active)       OT Problem       LTG - Patient will complete eating with set up. (Progressing)       Start:  09/09/24     Expected End:  09/23/24            LTG - Patient will complete grooming with sba. (Progressing)       Start:  09/09/24    Expected End:  09/23/24            LTG - Patient will complete toileting with mod assist. (Progressing)       Start:  09/09/24    Expected End:  09/23/24            LTG - Patient will complete bathing with min/mod assist. (Progressing)       Start:  09/09/24    Expected End:  09/23/24            LTG - Patient will complete UE dressing with sba. (Progressing)       Start:  09/09/24    Expected End:  09/23/24            LTG - Patient will complete LE dressing using adaptive equip as needed with mod assist. (Progressing)       Start:  09/09/24    Expected End:  09/23/24            LTG - Patient will complete commode transfer via pivot or device as needed with mod assist. (Progressing)       Start:  09/09/24    Expected End:  09/23/24            LTG - Patient will complete tub/shower transfer using DME as needed with mod/max assist. (Progressing)       Start:  09/09/24    Expected End:  09/23/24            LTG - Patient will complete simple mobility using device as needed with mod assist. (Progressing)       Start:  09/09/24    Expected End:  09/23/24            LTG - Patient will complete wheelchair mobility with sba. (Progressing)       Start:  09/09/24    Expected End:  09/23/24            STG - Patient will complete grooming with min assist. (Progressing)       Start:  09/09/24    Expected End:  09/16/24            STG - Patient will complete toileting with mod assist x2. (Progressing)       Start:  09/09/24    Expected End:  09/16/24            STG - Patient will complete sponge bathing with mod/max assist. (Progressing)       Start:  09/09/24    Expected End:  09/16/24            STG - Patient will complete UE dressing with mod assist. (Progressing)       Start:  09/09/24    Expected End:  09/16/24            STG - Patient will complete LE dressing using adaptive equip as needed with mod assist  x2. (Progressing)       Start:  09/09/24    Expected End:  09/16/24            STG - Patient will complete commode transfer via device as needed with min assist x2. (Progressing)       Start:  09/09/24    Expected End:  09/16/24            STG - Patient will complete simple wheelchair mobility with min assist. (Progressing)       Start:  09/09/24    Expected End:  09/16/24

## 2024-09-11 LAB
GLUCOSE BLD MANUAL STRIP-MCNC: 127 MG/DL (ref 74–99)
GLUCOSE BLD MANUAL STRIP-MCNC: 148 MG/DL (ref 74–99)
GLUCOSE BLD MANUAL STRIP-MCNC: 192 MG/DL (ref 74–99)

## 2024-09-11 PROCEDURE — 99232 SBSQ HOSP IP/OBS MODERATE 35: CPT | Performed by: PHYSICAL MEDICINE & REHABILITATION

## 2024-09-11 PROCEDURE — 97530 THERAPEUTIC ACTIVITIES: CPT | Mod: GP

## 2024-09-11 PROCEDURE — 97530 THERAPEUTIC ACTIVITIES: CPT | Mod: GO

## 2024-09-11 PROCEDURE — 1180000001 HC REHAB PRIVATE ROOM DAILY

## 2024-09-11 PROCEDURE — 97130 THER IVNTJ EA ADDL 15 MIN: CPT | Mod: GN

## 2024-09-11 PROCEDURE — 97110 THERAPEUTIC EXERCISES: CPT | Mod: GP

## 2024-09-11 PROCEDURE — 97129 THER IVNTJ 1ST 15 MIN: CPT | Mod: GN

## 2024-09-11 PROCEDURE — 97116 GAIT TRAINING THERAPY: CPT | Mod: GP

## 2024-09-11 PROCEDURE — 97535 SELF CARE MNGMENT TRAINING: CPT | Mod: GO

## 2024-09-11 PROCEDURE — 2500000002 HC RX 250 W HCPCS SELF ADMINISTERED DRUGS (ALT 637 FOR MEDICARE OP, ALT 636 FOR OP/ED): Performed by: PHYSICAL MEDICINE & REHABILITATION

## 2024-09-11 PROCEDURE — 97112 NEUROMUSCULAR REEDUCATION: CPT | Mod: GO

## 2024-09-11 PROCEDURE — 92507 TX SP LANG VOICE COMM INDIV: CPT | Mod: GN

## 2024-09-11 PROCEDURE — 2500000001 HC RX 250 WO HCPCS SELF ADMINISTERED DRUGS (ALT 637 FOR MEDICARE OP): Performed by: PHYSICAL MEDICINE & REHABILITATION

## 2024-09-11 PROCEDURE — 82947 ASSAY GLUCOSE BLOOD QUANT: CPT

## 2024-09-11 PROCEDURE — 92526 ORAL FUNCTION THERAPY: CPT | Mod: GN

## 2024-09-11 ASSESSMENT — COGNITIVE AND FUNCTIONAL STATUS - GENERAL
CLIMB 3 TO 5 STEPS WITH RAILING: TOTAL
WALKING IN HOSPITAL ROOM: TOTAL
DAILY ACTIVITIY SCORE: 11
MOVING FROM LYING ON BACK TO SITTING ON SIDE OF FLAT BED WITH BEDRAILS: A LOT
DRESSING REGULAR LOWER BODY CLOTHING: A LOT
MOBILITY SCORE: 8
TOILETING: TOTAL
PERSONAL GROOMING: A LOT
MOVING TO AND FROM BED TO CHAIR: TOTAL
MOBILITY SCORE: 10
TURNING FROM BACK TO SIDE WHILE IN FLAT BAD: A LOT
CLIMB 3 TO 5 STEPS WITH RAILING: TOTAL
TURNING FROM BACK TO SIDE WHILE IN FLAT BAD: A LOT
STANDING UP FROM CHAIR USING ARMS: A LOT
MOVING FROM LYING ON BACK TO SITTING ON SIDE OF FLAT BED WITH BEDRAILS: A LOT
DRESSING REGULAR UPPER BODY CLOTHING: A LOT
MOVING TO AND FROM BED TO CHAIR: A LOT
WALKING IN HOSPITAL ROOM: TOTAL
EATING MEALS: A LOT
STANDING UP FROM CHAIR USING ARMS: TOTAL
HELP NEEDED FOR BATHING: A LOT

## 2024-09-11 ASSESSMENT — PAIN - FUNCTIONAL ASSESSMENT
PAIN_FUNCTIONAL_ASSESSMENT: 0-10

## 2024-09-11 ASSESSMENT — PAIN SCALES - GENERAL
PAINLEVEL_OUTOF10: 0 - NO PAIN
PAINLEVEL_OUTOF10: 0 - NO PAIN
PAINLEVEL_OUTOF10: 6
PAINLEVEL_OUTOF10: 0 - NO PAIN

## 2024-09-11 ASSESSMENT — ACTIVITIES OF DAILY LIVING (ADL)
HOME_MANAGEMENT_TIME_ENTRY: 15
HOME_MANAGEMENT_TIME_ENTRY: 15

## 2024-09-11 ASSESSMENT — PAIN DESCRIPTION - ORIENTATION: ORIENTATION: LEFT

## 2024-09-11 NOTE — PROGRESS NOTES
Occupational Therapy    OT Treatment    Patient Name: Regulo Marques  MRN: [id number]  Department: [facility]  Room: [address] [place] [address]  Today's Date: 9/11/2024  Time Calculation  Start Time: 1300  Stop Time: 1345  Time Calculation (min): 45 min        Assessment:  End of Session Communication: Bedside nurse  End of Session Patient Position: Alarm on (recliner)     Plan:  Treatment Interventions: ADL retraining, Endurance training, Patient/family training, Neuromuscular reeducation, Functional transfer training, Visual perceptual retraining, UE strengthening/ROM, Cognitive reorientation  OT Frequency:  (5 days/week, 90 mins/day, for 2 weeks)  OT Discharge Recommendations:  (anticipate need for up to mod assist with transfers, ADLs, and mobility)  Equipment Recommended upon Discharge: Wheeled walker, Bedside commode (tub bench, reacher)  Treatment Interventions: ADL retraining, Endurance training, Patient/family training, Neuromuscular reeducation, Functional transfer training, Visual perceptual retraining, UE strengthening/ROM, Cognitive reorientation    Subjective   Previous Visit Info:  OT Last Visit  OT Received On: 09/11/24    General:  General  Prior to Session Communication: Bedside nurse  Patient Position Received: Up in chair, Alarm on  General Comment: patient pleasant, cooperative; [place] (needing to have BM during session; assisted patient to/from commode and assisted with toileting)    Precautions:  Hearing/Visual Limitations: [place]  Medical Precautions: Fall precautions, Swallowing precautions            Pain:  Pain Assessment  Pain Assessment: 0-10  0-10 (Numeric) Pain Score: 0 - No pain    Objective      Activities of Daily Living: Toileting  Toileting Comments: dependent for clothing management and hygiene following BM while standing at bedrail, additional mod/max assist x1 for standing balance during task    Bed Mobility/Transfers: Transfers  Transfer:  (max assist x1 for sit to stand transfers, pulling up from  bedrail or boubacar bar; increased assist, up to mod assist x2 for sit to stand transfers when pushing from arms of wheelchair first)    Toilet Transfers  Toilet Transfers Comments: mod/max assist x1 sit to stand from wheelchair (pulling from bed rail) to allow switch out of wheelchair for bedside commode    Standing Balance:  Static Standing Balance  Static Standing-Comment/Number of Minutes: patient requesting to focus on standing and balance; patient stands at boubacar bar with bilateral ue support for 1:30 mins and 55 sec with mod assist x1; increased fatigue noted this afternoon, with up to mod verbal cues to correct posture/ to maintain midline.       Therapy/Activity: Balance/Neuromuscular Re-Education  Balance/Neuromuscular Re-Education Activity Performed:  (BUE ther ex using one pound weighted dowel rafita, 10 reps x 2 sets x 4 exercises, with mod difficulty overall coordinating LUE; frequently losing left  on dowel rafita and unaware; max cues overall for technique, to slow pace, and to attend to LUE)     EDUCATION:  Education  Individual(s) Educated: Patient  Education Provided:  (safe transfers; ue hep)  Patient Response to Education:  (patient with fair understanding and follow through; will benefit from continued education)    Goals:  Encounter Problems       Encounter Problems (Active)       OT Problem       LTG - Patient will complete eating with set up. (Progressing)       Start:  09/09/24    Expected End:  09/23/24            LTG - Patient will complete grooming with sba. (Progressing)       Start:  09/09/24    Expected End:  09/23/24            LTG - Patient will complete toileting with mod assist. (Progressing)       Start:  09/09/24    Expected End:  09/23/24            LTG - Patient will complete bathing with min/mod assist. (Progressing)       Start:  09/09/24    Expected End:  09/23/24            LTG - Patient will complete UE dressing with sba. (Progressing)       Start:  09/09/24    Expected End:   09/23/24            LTG - Patient will complete LE dressing using adaptive equip as needed with mod assist. (Progressing)       Start:  09/09/24    Expected End:  09/23/24            LTG - Patient will complete commode transfer via pivot or device as needed with mod assist. (Progressing)       Start:  09/09/24    Expected End:  09/23/24            LTG - Patient will complete tub/shower transfer using DME as needed with mod/max assist. (Progressing)       Start:  09/09/24    Expected End:  09/23/24            LTG - Patient will complete simple mobility using device as needed with mod assist. (Progressing)       Start:  09/09/24    Expected End:  09/23/24            LTG - Patient will complete wheelchair mobility with sba. (Progressing)       Start:  09/09/24    Expected End:  09/23/24            STG - Patient will complete grooming with min assist. (Progressing)       Start:  09/09/24    Expected End:  09/16/24            STG - Patient will complete toileting with mod assist x2. (Progressing)       Start:  09/09/24    Expected End:  09/16/24            STG - Patient will complete sponge bathing with mod/max assist. (Progressing)       Start:  09/09/24    Expected End:  09/16/24            STG - Patient will complete UE dressing with mod assist. (Progressing)       Start:  09/09/24    Expected End:  09/16/24            STG - Patient will complete LE dressing using adaptive equip as needed with mod assist x2. (Progressing)       Start:  09/09/24    Expected End:  09/16/24            STG - Patient will complete commode transfer via device as needed with min assist x2. (Progressing)       Start:  09/09/24    Expected End:  09/16/24            STG - Patient will complete simple wheelchair mobility with min assist. (Progressing)       Start:  09/09/24    Expected End:  09/16/24

## 2024-09-11 NOTE — NURSING NOTE
MD contacted d/t concern for lack of anticoagulation therapy at present time .     MD Garcia stated to this nurse that she is following with GI for pts anticoagulation therapy recommendation.

## 2024-09-11 NOTE — PROGRESS NOTES
Speech-Language Pathology    SLP Adult IRF Speech-Language Pathology Treatment     Patient Name: Regulo Marques  MRN: [id number]  Today's Date: 9/11/2024  Time Calculation  Start Time: 1000  Stop Time: 1045  Time Calculation (min): 45 min       SLP Assessment:  SLP TX Intervention Outcome: Making Progress Towards Goals  Prognosis: Good  Treatment Tolerance: Patient tolerated treatment well  Strengths: Motivation  Barriers: Comorbidities      COGNITIVE LTG: established 9/9/24              1. Patient will demonstrate improved cognition for return home at a min assist level of care; PROGRESSING  DYSPHAGIA LTG: estabished 9/9/24   Patient will tolerate the LRD without clinical s/s of dysphagia; PROGRESSING     Plan:  SLP TX Plan: Continue Plan of Care  SLP Discharge Recommendations: Home SLP  npatient/Swing Bed or Outpatient: Inpatient  SLP Frequency: 5x per week  Duration: 2 weeks  SLP Discharge Recommendations: Home SLP  Discussed POC: Patient  Discussed Risks/Benefits: Yes  Continue POC established at evaluation 9/9/24       Subjective   Current Problem:  Dysphagia/dysarthria/cognitive deficits    Most Recent Visit:  SLP Received On: 09/11/24    General Visit Information:  Pt pleasant and cooperative. Used amplifier today.       Pain Assessment:  Pain Assessment  Pain Assessment: 0-10  0-10 (Numeric) Pain Score: 0 - No pain      Objective       Motor Speech:   Motor Speech Intervention : Word Repetitions    Utilizing slowed speech rate and overarticulation during multisyllabic word reading with good accuracy and use of strategies. Some carryover to conversational speech with improved intelligibility.     Cognitive Linguistics:  Cognitive Linguistics Interventions: Organization, Reasoning    Lower level organization for grouping items under appropriate headings within a field of 6. Completed with 100% accuracy with minimal cues.     Completing clocks and setting the time. Some mild difficulty putting numbers in the  correct area/space on clock; cues needed. Did set hands correctly for 4/4 trials given independent self correction x1.     Inpatient:  Education Documentation  Education provided as it relates to POC and progress.   Individual Educated: Patient  Patient Demonstrated Understanding: yes

## 2024-09-11 NOTE — SIGNIFICANT EVENT
"This brief note is to confirm that patient is okay to restart anticoagulation from GI standpoint.  Below is the full recommendation which was also found in the previous notes.    \"GI consulted because of question of anticoagulation in the setting of recent GI bleeding.  Patient was admitted to [facility] last month with GI bleeding, according to the family patient had melena and had 4 units of blood transfused.  We were able to find an endoscopy report that did not show any major abnormalities.  No colonoscopy was done and patient was sent home off of anticoagulation therapy.  He developed an acute stroke and presented to the hospital.     Currently patient is not able to give any history because he has some aphasia and neurological abnormalities.     This is a difficult situation because of increased risk of bleeding with anticoagulation and increased risk of stroke with holding anticoagulation however given the absence of any focal lesions on the endoscopy it is definitely acceptable to start patient on aspirin as per neurology request at this point.  If patient needs to be on anticoagulation there is no clear contraindication from a GI point of view.  In the absence of current GI bleeding there is no need for any endoscopic intervention however if patient does develop bleeding the next up would be due to a colonoscopy with possibly capsule endoscopy.        Discussed with the family and with the neurology team.  Will sign off.  Please call with questions.        Tariq Cope MD  09/04/24\"   "

## 2024-09-11 NOTE — CARE PLAN
The patient's goals for the shift include  safety    The clinical goals for the shift include remain hemodynamically stable.

## 2024-09-11 NOTE — PROGRESS NOTES
Physical Therapy    Physical Therapy Treatment    Patient Name: Regulo Marques  MRN: [id number]  Department: [facility]  Room: [address] [place], [place] [address]  Today's Date: 9/11/2024  Time Calculation  Start Time: 1045  Stop Time: 1130  Time Calculation (min): 45 min         Assessment/Plan         PT Plan  Treatment/Interventions: Bed mobility, Transfer training, Gait training, Stair training, Balance training, Strengthening, Endurance training, Therapeutic activity, Therapeutic exercise, Neuromuscular re-education  PT Plan: Ongoing PT  PT Frequency: 5 times per week (6x prn)  PT Discharge Recommendations:  (Anticipate discharge home with the need for mod assist at the walker level and will recommend first floor set-up.)  Equipment Recommended upon Discharge: Wheeled walker  PT Recommended Transfer Status: Assist x2      General Visit Information:   PT  Visit  PT Received On: 09/11/24  General  Patient Position Received: Up in chair, Alarm on  General Comment: Left apraxia, 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 Seated BLE AP, LAQ, marches x2 sets of 10 reps.)    Ambulation/Gait Training  Ambulation/Gait Training Performed:  (gait training 50' x2 with ww and mod assist of 2. Requires ace wrap on left hand to maintain  on walker, red tband on left lateral walker legs to maintain WBOS and prevent scissoring, 5# wt on each side of walker to help maintain stability.)    Transfers  Transfer:  (transfer training sit/stand with mod assist of 2 pushing up and reaching back for walker.)    Education Documentation  Patient educated in safe and proper transfer techniques  including proper positioning and hand/foot placement to maximize safety and functional independence.    Patient educated in safe techniques for gait with a device in order to maximize safety and functional independence.         Encounter Problems       Encounter Problems (Active)       PT Problem       STG - Bed mobility with max assist x1.   (Progressing)       Start:  09/09/24    Expected End:  09/16/24            STG - Transfers with mod assist x2. (Progressing)       Start:  09/09/24    Expected End:  09/16/24            STG - Pt will amb 20 ft in a str line with approp device and mod assist x2.   (Progressing)       Start:  09/09/24    Expected End:  09/16/24            STG - Will determine most approp means of entry into home.   (Progressing)       Start:  09/09/24    Expected End:  09/16/24            LTG - Bed mobility with mod assist x1. (Progressing)       Start:  09/09/24    Expected End:  09/23/24            LTG - Transfers with mod assist x1. (Progressing)       Start:  09/09/24    Expected End:  09/23/24            LTG - Pt will amb 50 ft with approp device and mod assist x1. (Progressing)       Start:  09/09/24    Expected End:  09/23/24            LTG - Will educate pt and family on most approp means of entry into home.   (Progressing)       Start:  09/09/24    Expected End:  09/23/24

## 2024-09-11 NOTE — PROGRESS NOTES
Occupational Therapy    OT Treatment    Patient Name: Regulo Marques  MRN: [id number]  Department: [facility]  Room: [address] [place] [address]  Today's Date: 9/11/2024  Time Calculation  Start Time: 0915  Stop Time: 1000  Time Calculation (min): 45 min        Assessment:  End of Session Communication:  (handoff to ST)  End of Session Patient Position: Up in chair, Alarm on     Plan:  Treatment Interventions: ADL retraining, Endurance training, Patient/family training, Neuromuscular reeducation, Functional transfer training, Visual perceptual retraining, UE strengthening/ROM, Cognitive reorientation  OT Frequency:  (5 days/week, 90 mins/day, for 2 weeks)  OT Discharge Recommendations:  (anticipate need for up to mod assist with transfers, ADLs, and mobility)  Equipment Recommended upon Discharge: Wheeled walker, Bedside commode (tub bench, reacher)  Treatment Interventions: ADL retraining, Endurance training, Patient/family training, Neuromuscular reeducation, Functional transfer training, Visual perceptual retraining, UE strengthening/ROM, Cognitive reorientation    Subjective   Previous Visit Info:  OT Last Visit  OT Received On: 09/11/24    General:  General  Prior to Session Communication:  (handoff from PT)  Patient Position Received: Up in chair, Alarm on  General Comment: patient pleasant and cooperative; [place], requires directions to be frequently repeated    Precautions:  Hearing/Visual Limitations: [place]  Medical Precautions: Fall precautions  Precautions Comment: Pureed diet and mildly thick liquids      Pain:  Pain Assessment  Pain Assessment: 0-10  0-10 (Numeric) Pain Score: 0 - No pain    Objective         Activities of Daily Living: LE Dressing  LE Dressing:  patient familiar with sock aid, reports using it daily at home to don socks; reports doffing socks at home by using opposite foot to pull sock off foot  Patient unable to doff socks using opposite foot to pull off; educated patient on use of dressing stick; following  demo, patient able to doff socks with dressing stick with sba, mod cues, and increased time to complete  Patient requires up to max assist to don sock using sock aid secondary to LUE incoordination and impaired sensation; patient often losing pinch/grasp of sock and sock aid and unaware       Bed Mobility/Transfers: Transfers  Transfer:  mod assist for sit to stand transfers, patient pulling up from boubacar bar; cued to push from wheelchair, however patient impulsive and quick moving and not following directions for safer technique      Standing Balance:  Static Standing Balance  Static Standing-Comment/Number of Minutes: static standing with bilateral ue support at boubacar-bar for 2:30 and 2:54 with min/mod assist; improved ability to maintain midline and stand upright (min cues overall)       Therapy/Activity: Balance/Neuromuscular Re-Education  Balance/Neuromuscular Re-Education Activity Performed:  patient transfers bean bags from right hand to left hand (at shoulder height) with mod difficulty; uses LUE to toss bean bags with mod difficulty; frequently dropping bean bag from hand and unaware; quick and impulsive with task       EDUCATION:  Education  Individual(s) Educated: Patient  Education Provided:  (safe transfers, techniques to improve balance and LUE function)  Patient Response to Education:  (fair understanding and follow-through - will benefit from continued education)    Goals:  Encounter Problems       Encounter Problems (Active)       OT Problem       LTG - Patient will complete eating with set up. (Progressing)       Start:  09/09/24    Expected End:  09/23/24            LTG - Patient will complete grooming with sba. (Progressing)       Start:  09/09/24    Expected End:  09/23/24            LTG - Patient will complete toileting with mod assist. (Progressing)       Start:  09/09/24    Expected End:  09/23/24            LTG - Patient will complete bathing with min/mod assist. (Progressing)       Start:   09/09/24    Expected End:  09/23/24            LTG - Patient will complete UE dressing with sba. (Progressing)       Start:  09/09/24    Expected End:  09/23/24            LTG - Patient will complete LE dressing using adaptive equip as needed with mod assist. (Progressing)       Start:  09/09/24    Expected End:  09/23/24            LTG - Patient will complete commode transfer via pivot or device as needed with mod assist. (Progressing)       Start:  09/09/24    Expected End:  09/23/24            LTG - Patient will complete tub/shower transfer using DME as needed with mod/max assist. (Progressing)       Start:  09/09/24    Expected End:  09/23/24            LTG - Patient will complete simple mobility using device as needed with mod assist. (Progressing)       Start:  09/09/24    Expected End:  09/23/24            LTG - Patient will complete wheelchair mobility with sba. (Progressing)       Start:  09/09/24    Expected End:  09/23/24            STG - Patient will complete grooming with min assist. (Progressing)       Start:  09/09/24    Expected End:  09/16/24            STG - Patient will complete toileting with mod assist x2. (Progressing)       Start:  09/09/24    Expected End:  09/16/24            STG - Patient will complete sponge bathing with mod/max assist. (Progressing)       Start:  09/09/24    Expected End:  09/16/24            STG - Patient will complete UE dressing with mod assist. (Progressing)       Start:  09/09/24    Expected End:  09/16/24            STG - Patient will complete LE dressing using adaptive equip as needed with mod assist x2. (Progressing)       Start:  09/09/24    Expected End:  09/16/24            STG - Patient will complete commode transfer via device as needed with min assist x2. (Progressing)       Start:  09/09/24    Expected End:  09/16/24            STG - Patient will complete simple wheelchair mobility with min assist. (Progressing)       Start:  09/09/24    Expected End:   09/16/24

## 2024-09-11 NOTE — PROGRESS NOTES
Physical Therapy    Physical Therapy Treatment    Patient Name: Regulo Marques  MRN: [id number]  Department: [facility]  Room: [address] [place], [place] [address]  Today's Date: 9/11/2024  Time Calculation  Start Time: 0830  Stop Time: 0915  Time Calculation (min): 45 min         Assessment/Plan         PT Plan  Treatment/Interventions: Bed mobility, Transfer training, Gait training, Stair training, Balance training, Strengthening, Endurance training, Therapeutic activity, Therapeutic exercise, Neuromuscular re-education  PT Plan: Ongoing PT  PT Frequency: 5 times per week (6x prn)  PT Discharge Recommendations:  (Anticipate discharge home with the need for mod assist at the walker level and will recommend first floor set-up.)  Equipment Recommended upon Discharge: Wheeled walker  PT Recommended Transfer Status: Assist x2      General Visit Information:   PT  Visit  PT Received On: 09/11/24  General  Patient Position Received: Up in chair, Alarm on  General Comment: patient [place], requires directions to be repeated frequently and visual demonstration throughout session    Subjective   Precautions:  Precautions  Medical Precautions: Fall precautions, Swallowing precautions  Precautions Comment: Pureed diet and mildly thick liquids      Objective   Pain:  Pain Assessment  Pain Assessment: 0-10  0-10 (Numeric) Pain Score: 0 - No pain     Treatments:  Therapeutic Exercise  Therapeutic Exercise Performed:  (Pt performed seated BLE strengthening and coordination ex 2 x 10 reps each with verbal and manual cues.)    Therapeutic Activity  Therapeutic Activity Performed:  (Pt stood at bedrail for 2 and 1/2 minutes x1 and 2 minutes x1 with min/mod assist x2 focusing on trunk and cervical extension and midline awareness.)    Ambulation/Gait Training  Ambulation/Gait Training Performed:  (Pt amb 16 ft x2 along hallway rail with mod assist x2.  Assist needed for wt shifting and to increase LLE abduction.)    Transfers  Transfer:  (Sit to stand transfer  training by pulling up on bedrail and hallway rail with min/mod assist x2.)    EDUCATION:  Education Comment:  (Pt educated on safe mobility techniques.)    Encounter Problems       Encounter Problems (Active)       PT Problem       STG - Bed mobility with max assist x1.   (Progressing)       Start:  09/09/24    Expected End:  09/16/24            STG - Transfers with mod assist x2. (Progressing)       Start:  09/09/24    Expected End:  09/16/24            STG - Pt will amb 20 ft in a str line with approp device and mod assist x2.   (Progressing)       Start:  09/09/24    Expected End:  09/16/24            STG - Will determine most approp means of entry into home.   (Progressing)       Start:  09/09/24    Expected End:  09/16/24            LTG - Bed mobility with mod assist x1. (Progressing)       Start:  09/09/24    Expected End:  09/23/24            LTG - Transfers with mod assist x1. (Progressing)       Start:  09/09/24    Expected End:  09/23/24            LTG - Pt will amb 50 ft with approp device and mod assist x1. (Progressing)       Start:  09/09/24    Expected End:  09/23/24            LTG - Will educate pt and family on most approp means of entry into home.   (Progressing)       Start:  09/09/24    Expected End:  09/23/24

## 2024-09-11 NOTE — PROGRESS NOTES
Speech-Language Pathology    SLP Adult IRF Speech-Language Pathology Treatment     Patient Name: Regulo Marques  MRN: [id number]  Today's Date: 9/11/2024  Time Calculation  Start Time: 1130  Stop Time: 1155  Time Calculation (min): 25 min       SLP Assessment:  SLP TX Intervention Outcome: Making Progress Towards Goals  Prognosis: Good  Treatment Tolerance: Patient tolerated treatment well  Strengths: Motivation  Barriers: Comorbidities    DYSPHAGIA LTG: estabished 9/9/24   Patient will tolerate the LRD without clinical s/s of dysphagia; PROGRESSING     Plan:  SLP TX Plan: Continue Plan of Care  SLP Discharge Recommendations: Home SLP  Continue POC established at initial evaluation 9/9/24     Subjective   Current Problem:  Dysphagia    Most Recent Visit:  SLP Received On: 09/11/24    General Visit Information:  Pt sitting up in wheelchair for lunch meal. Cooperative.         Pain Assessment:  Pain Assessment  Pain Assessment: 0-10  0-10 (Numeric) Pain Score: 0 - No pain      Objective       Therapeutic Swallow:  Therapeutic Swallow Intervention : PO Trials, Compensatory Strategies  Solid Diet Recommendations: Soft & bite sized/chopped (IDDSI Level 6)  Liquid Diet Recommendations: Nectar thick/mildly thick (IDDS Level 2)    Pt seen with newly upgraded soft/bite sized diet. Minimal-moderate verbal cues to swallow prior to taking next bite and to utilize a liquid wash for lingual residue. Adequate tolerance of upgraded diet. No overt or subtle s/s of aspiration. Minimal cues for smaller, single sips of liquids.       Inpatient:  Education Documentation  Education provided as it relates to POC and progress.   Plan of Care Discussed and Agreed Upon: yes

## 2024-09-11 NOTE — PROGRESS NOTES
"  Regulo Marques is a 83 y.o. 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    Subjective   Patient is participating in therapy. Confirmed that he is mod/Max A x 2. Leaning left.        Objective       Physical Exam    Pleasant male in no apparent stress.  He is alert and oriented x 3.  Clear to auscultation bilaterally  Cardiovascular S1-S2   Abdomen soft nontender nondistended with active bowel sounds.    Vascular congestion bilateral lower extremities but negative Homans bilaterally.  Mild left hemiparesis upper greater than lower extremity.  Some left inattention and left neglect.  Function: Patient is requiring mod assist of 2 for functional mobility.    Assessment/Plan        Last Recorded Vitals  Blood pressure 110/59, pulse 51, temperature 36.2 °C (97.2 °F), temperature source Temporal, resp. rate 16, height 1.753 m (5' 9.02\"), weight 93 kg (205 lb 0.4 oz), SpO2 97%.    Therapy notes from last 24 hours reviewed.    Relevant Results                  Scheduled medications  atorvastatin, 80 mg, oral, Nightly  insulin lispro, 0-5 Units, subcutaneous, TID  sennosides, 1 tablet, oral, Nightly  tamsulosin, 0.8 mg, oral, Nightly      Continuous medications     PRN medications  PRN medications: acetaminophen **OR** acetaminophen, alum-mag hydroxide-simeth, bisacodyl, bisacodyl, melatonin     Results for orders placed or performed during the hospital encounter of 09/08/24 (from the past 24 hour(s))   POCT GLUCOSE   Result Value Ref Range    POCT Glucose 165 (H) 74 - 99 mg/dL   POCT GLUCOSE   Result Value Ref Range    POCT Glucose 127 (H) 74 - 99 mg/dL   POCT GLUCOSE   Result Value Ref Range    POCT Glucose 192 (H) 74 - 99 mg/dL                 Assessment/Plan   Principal Problem:    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  -Start speech therapy for swallowing cognition  -Will discuss with family regards stroke prophylaxis and starting of anticoagulation due to recent GI bleed.  -Will follow-up with GI.  -Continue statin  -Continue blood pressure management, add medications as appropriate  Estimated length of stay is 20 days discharge with intermittent assist.  CVA (cerebrovascular accident due to intracerebral hemorrhage) (Multi)  -Continue plan of care as noted above     Anemia  -History of GI bleed.  -Will continue to follow H&H and treat as indicated  -Await starting anticoagulation until stable     Chronic diastolic heart failure (Multi)  -Monitor current cardiac function.  Currently on no medications for his cardiac dysfunction     Diabetes mellitus (Multi)  -Monitor blood sugars and adjust medications as indicated.     Essential hypertension, benign  -Monitor blood pressure and treat as indicated     History of malignant neoplasm of colon  -History of GI bleed.  Will follow-up with GI.  -Questionable pending anticoagulation     Longstanding persistent atrial fibrillation (Multi)  -Monitor heart rate and adjust medications as appropriate  -Pending decision for anticoagulation due to risk of GI bleed     Obesity (BMI 30-39.9)  -Monitor diet and continue supportive nutrition.  Atrial fibrillation (Multi)  -Hold anticoagulation at this time until clarification from family.  -Neurology was to start Eliquis.  Will connect the teams.  -Will obtain GI input if indicated.     Urinary retention  -on  flomax  -look at TOV in the near future and plan of care with [facility]  -facilitate active BM  -increase moblity     Bowel/Bladder  -facilitate daily active BM   -continence of bladder per timed void schedule and rehab nursing  -check PVRs and treat appropriately     DVT prophylaxis  -SCDs and ambulation  -Chemoprophylaxis on hold due to risk of GI bleed.     FEN  -follow BMP and treat appropriately  -monitor oral intake daily      9/11  -ongoing rehab  -discussion regarding antigocation with family. All specialities recommend treatment. PCP does not due to increased risk of bleeding.  -BP on the lower end. Continue to follow and avoid hypotension  -TOV today with a follow up PVR       I spent 35 minutes in the professional and overall care of this patient.      Allison Díaz MD

## 2024-09-12 LAB
ANION GAP SERPL CALC-SCNC: 11 MMOL/L (ref 10–20)
BASOPHILS # BLD AUTO: 0.02 X10*3/UL (ref 0–0.1)
BASOPHILS NFR BLD AUTO: 0.2 %
BUN SERPL-MCNC: 37 MG/DL (ref 6–23)
CALCIUM SERPL-MCNC: 9.1 MG/DL (ref 8.6–10.3)
CHLORIDE SERPL-SCNC: 109 MMOL/L (ref 98–107)
CO2 SERPL-SCNC: 23 MMOL/L (ref 21–32)
CREAT SERPL-MCNC: 1.61 MG/DL (ref 0.5–1.3)
EGFRCR SERPLBLD CKD-EPI 2021: 42 ML/MIN/1.73M*2
EOSINOPHIL # BLD AUTO: 0.2 X10*3/UL (ref 0–0.4)
EOSINOPHIL NFR BLD AUTO: 2.5 %
ERYTHROCYTE [DISTWIDTH] IN BLOOD BY AUTOMATED COUNT: 15.7 % (ref 11.5–14.5)
GLUCOSE BLD MANUAL STRIP-MCNC: 116 MG/DL (ref 74–99)
GLUCOSE BLD MANUAL STRIP-MCNC: 164 MG/DL (ref 74–99)
GLUCOSE BLD MANUAL STRIP-MCNC: 164 MG/DL (ref 74–99)
GLUCOSE SERPL-MCNC: 123 MG/DL (ref 74–99)
HCT VFR BLD AUTO: 24.9 % (ref 41–52)
HGB BLD-MCNC: 7.5 G/DL (ref 13.5–17.5)
HOLD SPECIMEN: NORMAL
IMM GRANULOCYTES # BLD AUTO: 0.03 X10*3/UL (ref 0–0.5)
IMM GRANULOCYTES NFR BLD AUTO: 0.4 % (ref 0–0.9)
LYMPHOCYTES # BLD AUTO: 0.94 X10*3/UL (ref 0.8–3)
LYMPHOCYTES NFR BLD AUTO: 11.7 %
MCH RBC QN AUTO: 30.2 PG (ref 26–34)
MCHC RBC AUTO-ENTMCNC: 30.1 G/DL (ref 32–36)
MCV RBC AUTO: 100 FL (ref 80–100)
MONOCYTES # BLD AUTO: 0.46 X10*3/UL (ref 0.05–0.8)
MONOCYTES NFR BLD AUTO: 5.7 %
NEUTROPHILS # BLD AUTO: 6.38 X10*3/UL (ref 1.6–5.5)
NEUTROPHILS NFR BLD AUTO: 79.5 %
NRBC BLD-RTO: 0 /100 WBCS (ref 0–0)
PLATELET # BLD AUTO: 180 X10*3/UL (ref 150–450)
POTASSIUM SERPL-SCNC: 4.3 MMOL/L (ref 3.5–5.3)
RBC # BLD AUTO: 2.48 X10*6/UL (ref 4.5–5.9)
SODIUM SERPL-SCNC: 139 MMOL/L (ref 136–145)
WBC # BLD AUTO: 8 X10*3/UL (ref 4.4–11.3)

## 2024-09-12 PROCEDURE — 97535 SELF CARE MNGMENT TRAINING: CPT | Mod: GO,CO

## 2024-09-12 PROCEDURE — 92526 ORAL FUNCTION THERAPY: CPT | Mod: GN

## 2024-09-12 PROCEDURE — 97112 NEUROMUSCULAR REEDUCATION: CPT | Mod: GO,CO

## 2024-09-12 PROCEDURE — 99232 SBSQ HOSP IP/OBS MODERATE 35: CPT | Performed by: PHYSICAL MEDICINE & REHABILITATION

## 2024-09-12 PROCEDURE — 36415 COLL VENOUS BLD VENIPUNCTURE: CPT | Performed by: PHYSICAL MEDICINE & REHABILITATION

## 2024-09-12 PROCEDURE — 97129 THER IVNTJ 1ST 15 MIN: CPT | Mod: GN

## 2024-09-12 PROCEDURE — 51701 INSERT BLADDER CATHETER: CPT

## 2024-09-12 PROCEDURE — 2500000001 HC RX 250 WO HCPCS SELF ADMINISTERED DRUGS (ALT 637 FOR MEDICARE OP): Performed by: PHYSICAL MEDICINE & REHABILITATION

## 2024-09-12 PROCEDURE — 2500000002 HC RX 250 W HCPCS SELF ADMINISTERED DRUGS (ALT 637 FOR MEDICARE OP, ALT 636 FOR OP/ED): Performed by: PHYSICAL MEDICINE & REHABILITATION

## 2024-09-12 PROCEDURE — 97130 THER IVNTJ EA ADDL 15 MIN: CPT | Mod: GN

## 2024-09-12 PROCEDURE — 97530 THERAPEUTIC ACTIVITIES: CPT | Mod: GP

## 2024-09-12 PROCEDURE — 82947 ASSAY GLUCOSE BLOOD QUANT: CPT

## 2024-09-12 PROCEDURE — 80051 ELECTROLYTE PANEL: CPT | Performed by: PHYSICAL MEDICINE & REHABILITATION

## 2024-09-12 PROCEDURE — 1180000001 HC REHAB PRIVATE ROOM DAILY

## 2024-09-12 PROCEDURE — 97116 GAIT TRAINING THERAPY: CPT | Mod: GP

## 2024-09-12 PROCEDURE — 85025 COMPLETE CBC W/AUTO DIFF WBC: CPT | Performed by: PHYSICAL MEDICINE & REHABILITATION

## 2024-09-12 ASSESSMENT — PAIN - FUNCTIONAL ASSESSMENT
PAIN_FUNCTIONAL_ASSESSMENT: 0-10

## 2024-09-12 ASSESSMENT — COGNITIVE AND FUNCTIONAL STATUS - GENERAL
WALKING IN HOSPITAL ROOM: TOTAL
CLIMB 3 TO 5 STEPS WITH RAILING: TOTAL
MOBILITY SCORE: 10
MOVING TO AND FROM BED TO CHAIR: A LOT
STANDING UP FROM CHAIR USING ARMS: A LOT
MOVING FROM LYING ON BACK TO SITTING ON SIDE OF FLAT BED WITH BEDRAILS: A LOT
TURNING FROM BACK TO SIDE WHILE IN FLAT BAD: A LOT

## 2024-09-12 ASSESSMENT — ACTIVITIES OF DAILY LIVING (ADL)
HOME_MANAGEMENT_TIME_ENTRY: 20
HOME_MANAGEMENT_TIME_ENTRY: 45

## 2024-09-12 ASSESSMENT — PAIN SCALES - GENERAL
PAINLEVEL_OUTOF10: 0 - NO PAIN

## 2024-09-12 ASSESSMENT — PAIN SCALES - WONG BAKER: WONGBAKER_NUMERICALRESPONSE: NO HURT

## 2024-09-12 NOTE — PROGRESS NOTES
Physical Therapy    Physical Therapy Treatment    Patient Name: Regulo Marques  MRN: [id number]  Department: [facility]  Room: [address] [place] [address]  Today's Date: 9/12/2024  Time Calculation  Start Time: 1345  Stop Time: 1430  Time Calculation (min): 45 min         Assessment/Plan   PT Assessment  End of Session Communication: Bedside nurse  End of Session Patient Position: Up in chair, Alarm on     PT Plan  Treatment/Interventions: Bed mobility, Transfer training, Gait training, Stair training, Balance training, Strengthening, Endurance training, Therapeutic activity, Therapeutic exercise, Neuromuscular re-education  PT Plan: Ongoing PT  PT Frequency: 5 times per week (6x prn)  PT Discharge Recommendations:  (Anticipate discharge home with the need for mod assist at the walker level and will recommend first floor set-up.)  Equipment Recommended upon Discharge: Wheeled walker  PT Recommended Transfer Status: Assist x2      General Visit Information:   PT  Visit  PT Received On: 09/12/24  General  Family/Caregiver Present: Yes  Caregiver Feedback: Wife present observing session. Wife riding on power scooter entire duration.  Prior to Session Communication:  (handoff from OT)  Patient Position Received: Up in chair, Alarm on  General Comment: Agreeable and hard working.    Subjective   Precautions:  Precautions  Medical Precautions: Fall precautions, Swallowing precautions  Precautions Comment: Pureed diet and mildly thick liquids    Objective   Pain:  Pain Assessment  Pain Assessment: 0-10  0-10 (Numeric) Pain Score: 0 - No pain     Treatments:  Ambulation/Gait Training  Ambulation/Gait Training Performed: Yes  Ambulation/Gait Training 1  Comments/Distance (ft) 1: Pt amb the following 4 trials: 50' x 2, 50' x 2, 50' x 2, 50' x 2. All gait with FWW and mod A x 2 overall plus wc follow. Initial trials 3 with red tband tied to left side of walker in order to prevent scissoring. Final trial without red tband, verbal cues to keep LLE  by left walker wheel with fairly good results and multiple reps of self correction when NASIR started to become slightly narrow. Trialed various devices to assist with maintaining L hand grasp including manual assist,  assist attachment, and velcro strap with dysom. (Velcro strap with dysom best results today.)  Transfers  Transfer:  (Sit/stand transfer training with mod A x 2 overall. Assist to manage LUE due to severe ataxia.)    Education Documentation  Pt educated on techniques for transfers and gait training with device in order to maximize safety and independence.       Encounter Problems       Encounter Problems (Active)       PT Problem       STG - Bed mobility with max assist x1.   (Progressing)       Start:  09/09/24    Expected End:  09/16/24            STG - Transfers with mod assist x2. (Progressing)       Start:  09/09/24    Expected End:  09/16/24            STG - Pt will amb 20 ft in a str line with approp device and mod assist x2.   (Progressing)       Start:  09/09/24    Expected End:  09/16/24            STG - Will determine most approp means of entry into home.   (Progressing)       Start:  09/09/24    Expected End:  09/16/24            LTG - Bed mobility with mod assist x1. (Progressing)       Start:  09/09/24    Expected End:  09/23/24            LTG - Transfers with mod assist x1. (Progressing)       Start:  09/09/24    Expected End:  09/23/24            LTG - Pt will amb 50 ft with approp device and mod assist x1. (Progressing)       Start:  09/09/24    Expected End:  09/23/24            LTG - Will educate pt and family on most approp means of entry into home.   (Progressing)       Start:  09/09/24    Expected End:  09/23/24

## 2024-09-12 NOTE — CARE PLAN
The patient's goals for the shift include  decrease pain and improve strength.     The clinical goals for the shift include stay continent and safety

## 2024-09-12 NOTE — CARE PLAN
The patient's goals for the shift include  maintain safety.     The clinical goals for the shift include remain hemodynamically stable and improvement in strength.     Over the shift, the patient did not make progress toward the following goals. Barriers to progression include weakness. Recommendations to address these barriers include therapy services.

## 2024-09-12 NOTE — PROGRESS NOTES
Physical Therapy    Physical Therapy Treatment    Patient Name: Regulo Marques  MRN: [id number]  Department: [facility]  Room: [address] [place] [address]  Today's Date: 9/12/2024  Time Calculation  Start Time: 1045  Stop Time: 1130  Time Calculation (min): 45 min         Assessment/Plan   PT Assessment  End of Session Communication: Bedside nurse  End of Session Patient Position: Up in chair, Alarm on     PT Plan  Treatment/Interventions: Bed mobility, Transfer training, Gait training, Stair training, Balance training, Strengthening, Endurance training, Therapeutic activity, Therapeutic exercise, Neuromuscular re-education  PT Plan: Ongoing PT  PT Frequency: 5 times per week (6x prn)  PT Discharge Recommendations:  (Anticipate discharge home with the need for mod assist at the walker level and will recommend first floor set-up.)  Equipment Recommended upon Discharge: Wheeled walker  PT Recommended Transfer Status: Assist x2      General Visit Information:   PT  Visit  PT Received On: 09/12/24  General  Family/Caregiver Present: Yes  Caregiver Feedback:  (wife present during part of PT session)  Prior to Session Communication: Bedside nurse  Patient Position Received: Up in chair, Alarm on  General Comment: Pt plesant and cooperative, motivated for therapy.    Subjective   Precautions:  Precautions  Hearing/Visual Limitations: [place]  Medical Precautions: Fall precautions, Swallowing precautions  Precautions Comment: Pureed diet and mildly thick liquids    Objective   Pain:  Pain Assessment  Pain Assessment: 0-10  0-10 (Numeric) Pain Score: 0 - No pain    Treatments:  Ambulation/Gait Training  Ambulation/Gait Training Performed:  (Pt ambulates 15 ft at R hallway rail with mod/min A x 2 plus w/c follow.  Assist for preventing LLE scissoring, guiding L foot placement.)  Ambulation/Gait Training 1  Assistance 1:  (Pt requires assist to steer walker throughout gait training.)  Comments/Distance (ft) 1:  (Pt ambulates 75 ft x 2, 75 ft x 2  with FWW with red TB attached to L side of walker to facilitate LLE hip abd to prevent scissoring.  Pt requires mod A x 2 to ambulate down cantor in straight line plus w/c follow.  Assist to maintain L  intermittently.)  Transfers  Transfer:  (Pt performs sit/stand transfers with mod A x 2)    Education Documentation  Pt educated on techniques for transfers and gait training with device in order to maximize safety and independence.    Encounter Problems       Encounter Problems (Active)       PT Problem       STG - Bed mobility with max assist x1.   (Progressing)       Start:  09/09/24    Expected End:  09/16/24            STG - Transfers with mod assist x2. (Progressing)       Start:  09/09/24    Expected End:  09/16/24            STG - Pt will amb 20 ft in a str line with approp device and mod assist x2.   (Progressing)       Start:  09/09/24    Expected End:  09/16/24            STG - Will determine most approp means of entry into home.   (Progressing)       Start:  09/09/24    Expected End:  09/16/24            LTG - Bed mobility with mod assist x1. (Progressing)       Start:  09/09/24    Expected End:  09/23/24            LTG - Transfers with mod assist x1. (Progressing)       Start:  09/09/24    Expected End:  09/23/24            LTG - Pt will amb 50 ft with approp device and mod assist x1. (Progressing)       Start:  09/09/24    Expected End:  09/23/24            LTG - Will educate pt and family on most approp means of entry into home.   (Progressing)       Start:  09/09/24    Expected End:  09/23/24

## 2024-09-12 NOTE — PROGRESS NOTES
Occupational Therapy    OT Treatment    Patient Name: Regulo Marques  MRN: [id number]  Department: [facility] REHAB  Room: [facility]468  Today's Date: 9/12/2024  Time Calculation  Start Time: 1300  Stop Time: 1345  Time Calculation (min): 45 min    Plan:  Treatment Interventions: ADL retraining, Endurance training, Patient/family training, Neuromuscular reeducation, Functional transfer training, Visual perceptual retraining, UE strengthening/ROM, Cognitive reorientation  OT Frequency:  (5 days/week, 90 mins/day, for 2 weeks)  OT Discharge Recommendations:  (anticipate need for up to mod assist with transfers, ADLs, and mobility)  Equipment Recommended upon Discharge: Wheeled walker, Bedside commode (tub bench, reacher)  Treatment Interventions: ADL retraining, Endurance training, Patient/family training, Neuromuscular reeducation, Functional transfer training, Visual perceptual retraining, UE strengthening/ROM, Cognitive reorientation    Subjective   Previous Visit Info:  OT Last Visit  OT Received On: 09/12/24  General:  General  Family/Caregiver Present: Yes  Caregiver Feedback: Wife present observing session. Wife uses powered scooter  Prior to Session Communication: Bedside nurse, Care Coordinator  Patient Position Received: Up in chair, Alarm on  General Comment: Agreeable to OT session. [place]  Precautions:  Medical Precautions: Fall precautions, Swallowing precautions    Pain:  Pain Assessment  Pain Assessment: 0-10  0-10 (Numeric) Pain Score: 0 - No pain    Activities of Daily Living: LE Dressing  LE Dressing: Yes  Pants Level of Assistance:  (Dons pants with total assist over feet, Mod assist x1 for standing balance and total assist for pulling pants up over hips d/t inability to safely let go. Cues for L  on bed rail.)  LE Dressing Comments: Doffs pants from hips with Mod A for balance at bed rail with unilateral support (LUE on bed rail). Dons shorts using reacher to thread legs through with Mod A, pulls up  depends and pants over hips with Mod assist x1-2 at bed rail. Doffs socks using dressing stick with SBA and moderate cues for best technique. Repetition at times required. Dons socks using sock aid and Max A; increased difficulty this date. Noted difficulty 2/2 decreased coordination, however despite compensatory techniques provided and repeated instruction pt does not follow.    Toileting  Toileting Comments: Patient incontinent of urine. Standing at bed rail Mod assist x1-2 for clean up. Assist for managing urinal.    Bed Mobility/Transfers: Transfers  Transfer: Yes  Transfer 1  Trials/Comments 1: Mutliple sit to stand transfers pushing from chair with Mod assist x2.   EDUCATION:  Education  Individual(s) Educated: Patient  Education Provided: POC discussed and agreed upon, Fall precautons, Diagnosis & Precautions  Review of lower body dressing a/e.  Goals:  Encounter Problems       Encounter Problems (Active)       OT Problem       LTG - Patient will complete eating with set up. (Progressing)       Start:  09/09/24    Expected End:  09/23/24            LTG - Patient will complete grooming with sba. (Progressing)       Start:  09/09/24    Expected End:  09/23/24            LTG - Patient will complete toileting with mod assist. (Progressing)       Start:  09/09/24    Expected End:  09/23/24            LTG - Patient will complete bathing with min/mod assist. (Progressing)       Start:  09/09/24    Expected End:  09/23/24            LTG - Patient will complete UE dressing with sba. (Progressing)       Start:  09/09/24    Expected End:  09/23/24            LTG - Patient will complete LE dressing using adaptive equip as needed with mod assist. (Progressing)       Start:  09/09/24    Expected End:  09/23/24            LTG - Patient will complete commode transfer via pivot or device as needed with mod assist. (Progressing)       Start:  09/09/24    Expected End:  09/23/24            LTG - Patient will complete tub/shower  transfer using DME as needed with mod/max assist. (Progressing)       Start:  09/09/24    Expected End:  09/23/24            LTG - Patient will complete simple mobility using device as needed with mod assist. (Progressing)       Start:  09/09/24    Expected End:  09/23/24            LTG - Patient will complete wheelchair mobility with sba. (Progressing)       Start:  09/09/24    Expected End:  09/23/24            STG - Patient will complete grooming with min assist. (Progressing)       Start:  09/09/24    Expected End:  09/18/24            STG - Patient will complete toileting with mod assist x2. (Progressing)       Start:  09/09/24    Expected End:  09/18/24            STG - Patient will complete sponge bathing with mod/max assist. (Progressing)       Start:  09/09/24    Expected End:  09/18/24            STG - Patient will complete UE dressing with mod assist. (Met)       Start:  09/09/24    Expected End:  09/16/24    Resolved:  09/11/24    Updated to: STG - Patient will complete UE dressing with min assist.    Update reason: STG met         STG - Patient will complete LE dressing using adaptive equip as needed with mod assist x2. (Met)       Start:  09/09/24    Expected End:  09/16/24    Resolved:  09/11/24    Updated to: STG - Patient will complete LE dressing using adaptive equip as needed with min assist x2.    Update reason: STG met         STG - Patient will complete commode transfer via device as needed with min assist x2. (Progressing)       Start:  09/09/24    Expected End:  09/18/24            STG - Patient will complete simple wheelchair mobility with min assist. (Progressing)       Start:  09/09/24    Expected End:  09/18/24            STG - Patient will complete UE dressing with min assist. (Progressing)       Start:  09/11/24    Expected End:  09/18/24                STG - Patient will complete LE dressing using adaptive equip as needed with min assist x2. (Progressing)       Start:  09/11/24     Expected End:  09/18/24

## 2024-09-12 NOTE — PROGRESS NOTES
Occupational Therapy    OT Treatment    Patient Name: Regulo Marques  MRN: [id number]  Department: [facility] REHAB  Room: [facility]A  Today's Date: 9/12/2024  Time Calculation  Start Time: 0830  Stop Time: 0915  Time Calculation (min): 45 min        Assessment:        Plan:  Treatment Interventions: ADL retraining, Endurance training, Patient/family training, Neuromuscular reeducation, Functional transfer training, Visual perceptual retraining, UE strengthening/ROM, Cognitive reorientation  OT Frequency:  (5 days/week, 90 mins/day, for 2 weeks)  OT Discharge Recommendations:  (anticipate need for up to mod assist with transfers, ADLs, and mobility)  Equipment Recommended upon Discharge: Wheeled walker, Bedside commode (tub bench, reacher)  Treatment Interventions: ADL retraining, Endurance training, Patient/family training, Neuromuscular reeducation, Functional transfer training, Visual perceptual retraining, UE strengthening/ROM, Cognitive reorientation    Subjective   Previous Visit Info:  OT Last Visit  OT Received On: 09/12/24  General:  General  Prior to Session Communication: Bedside nurse  Patient Position Received: Up in chair, Alarm on  General Comment: Agreeable to OT session. [place]  Precautions:  Medical Precautions: Fall precautions, Swallowing precautions    Pain:  Pain Assessment  Pain Assessment: 0-10  0-10 (Numeric) Pain Score: 0 - No pain    Activities of Daily Living: LE Dressing  LE Dressing: Yes  Pants Level of Assistance:  (Dons pants with total assist over feet, Mod assist x1 for standing balance and total assist for pulling pants up over hips d/t inability to safely let go. Cues for L  on bed rail.)  Functional Standing Tolerance:     Bed Mobility/Transfers: Transfers  Transfer: Yes  Transfer 1  Trials/Comments 1: Sit to stand transfers with Max assist x1 pulling from bed rail to stand.    Standing Balance:  Dynamic Standing Balance  Dynamic Standing-Comments: Dynamic standing balance at boubacar bar  challenging patient to grab bean bags and toss across midline with Mod A for balance at times d/t L lean with fatigue. Impulsive at times. Completes 40-45 second intervals x3.    Therapy/Activity: Balance/Neuromuscular Re-Education  Balance/Neuromuscular Re-Education Activity Performed: Yes  Balance/Neuromuscular Re-Education Activity 1: LUE grasp and relase of bean bags and tossing into basket with with minimal difficulty maintaining midline.  EDUCATION:  Education  Individual(s) Educated: Patient  Education Provided: POC discussed and agreed upon, Fall precautons, Diagnosis & Precautions  Education Comment: Education provided on safety during transfers, midline awareness, dressing safety.    Goals:  Encounter Problems       Encounter Problems (Active)       OT Problem       LTG - Patient will complete eating with set up. (Progressing)       Start:  09/09/24    Expected End:  09/23/24            LTG - Patient will complete grooming with sba. (Progressing)       Start:  09/09/24    Expected End:  09/23/24            LTG - Patient will complete toileting with mod assist. (Progressing)       Start:  09/09/24    Expected End:  09/23/24            LTG - Patient will complete bathing with min/mod assist. (Progressing)       Start:  09/09/24    Expected End:  09/23/24            LTG - Patient will complete UE dressing with sba. (Progressing)       Start:  09/09/24    Expected End:  09/23/24            LTG - Patient will complete LE dressing using adaptive equip as needed with mod assist. (Progressing)       Start:  09/09/24    Expected End:  09/23/24            LTG - Patient will complete commode transfer via pivot or device as needed with mod assist. (Progressing)       Start:  09/09/24    Expected End:  09/23/24            LTG - Patient will complete tub/shower transfer using DME as needed with mod/max assist. (Progressing)       Start:  09/09/24    Expected End:  09/23/24            LTG - Patient will complete simple  mobility using device as needed with mod assist. (Progressing)       Start:  09/09/24    Expected End:  09/23/24            LTG - Patient will complete wheelchair mobility with sba. (Progressing)       Start:  09/09/24    Expected End:  09/23/24            STG - Patient will complete grooming with min assist. (Progressing)       Start:  09/09/24    Expected End:  09/18/24            STG - Patient will complete toileting with mod assist x2. (Progressing)       Start:  09/09/24    Expected End:  09/18/24            STG - Patient will complete sponge bathing with mod/max assist. (Progressing)       Start:  09/09/24    Expected End:  09/18/24            STG - Patient will complete UE dressing with mod assist. (Met)       Start:  09/09/24    Expected End:  09/16/24    Resolved:  09/11/24    Updated to: STG - Patient will complete UE dressing with min assist.    Update reason: STG met         STG - Patient will complete LE dressing using adaptive equip as needed with mod assist x2. (Met)       Start:  09/09/24    Expected End:  09/16/24    Resolved:  09/11/24    Updated to: STG - Patient will complete LE dressing using adaptive equip as needed with min assist x2.    Update reason: STG met         STG - Patient will complete commode transfer via device as needed with min assist x2. (Progressing)       Start:  09/09/24    Expected End:  09/18/24            STG - Patient will complete simple wheelchair mobility with min assist. (Progressing)       Start:  09/09/24    Expected End:  09/18/24            STG - Patient will complete UE dressing with min assist. (Progressing)       Start:  09/11/24    Expected End:  09/18/24                STG - Patient will complete LE dressing using adaptive equip as needed with min assist x2. (Progressing)       Start:  09/11/24    Expected End:  09/18/24

## 2024-09-12 NOTE — PROGRESS NOTES
Speech-Language Pathology    SLP Adult IRF Speech-Language Pathology Treatment     Patient Name: Regulo Marques  MRN: [id number]  Today's Date: 9/12/2024  Time Calculation  Start Time: 1000  Stop Time: 1045  Time Calculation (min): 45 min         SLP Assessment:  SLP TX Intervention Outcome: Making Progress Towards Goals  Prognosis: Good  Treatment Tolerance: Patient tolerated treatment well      COGNITIVE LTG: established 9/9/24              1. Patient will demonstrate improved cognition for return home at a min assist level of care; PROGRESSING  DYSPHAGIA LTG: estabished 9/9/24   Patient will tolerate the LRD without clinical s/s of dysphagia; PROGRESSING       Plan:  SLP TX Plan: Continue Plan of Care  SLP Discharge Recommendations: Home SLP      Subjective   Current Problem:  Dysphagia/dysarthria/cognitive deficits    Most Recent Visit:  SLP Received On: 09/12/24    General Visit Information:  Pleasant and cooperative. Repositioned in chair this date. Pt does complain of some mild discomfort with clothing gathered under his bottom; may need repositioned again-PT made aware.       Pain Assessment:  Pain Assessment  Pain Assessment: 0-10  0-10 (Numeric) Pain Score: 0 - No pain      Objective       Therapeutic Swallow:  Pharyngeal Strengthening Techniques: Chin Tuck Against Resistance    Completed chin tuck against resistance with 2 sets of 15. Good tolerance of mildly thick liquids during tx session; continues to need cues for smaller, single sips. No overt s/s aspiration.     Cognitive Linguistics:  Cognitive Linguistics Interventions: Reasoning, Attention    Lower level organization task related to calendar information completed with 60% accuracy independently but up to 100% accuracy with moderate cues.       Inpatient:  Education Documentation  Education provided as it relates to results of testing, POC and progress.  Individual(s) Educated: Patient  Verbal Education : yes  Patient Demonstrated Understanding:  yes  Plan of Care Discussed and Agreed Upon: yes

## 2024-09-12 NOTE — PROGRESS NOTES
"  Regulo aMrques is a 83 y.o. 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    Subjective   Patient was seen today in speech therapy.  We did discuss the use of anticoagulation.  I did confirm with GI that he is cleared for use of Eliquis by GI.       Objective       Physical Exam  Patient is alert and oriented x 3.  He is doing appropriate responses and speech therapy  Chest clear to auscultation bilaterally  Cardiovascular S1-S2 irregular at times  Abdomen soft nontender nondistended with active bowel sounds  Negative Homans bilaterally but PVD changes distally in the lower extremities with mild edema present  Left hemiparesis with left neglect and left inattention present.  Function: Mod assist of 2  Assessment/Plan        Last Recorded Vitals  Blood pressure 123/58, pulse 56, temperature 36.3 °C (97.3 °F), resp. rate 16, height 1.753 m (5' 9.02\"), weight 93 kg (205 lb 0.4 oz), SpO2 98%.    Therapy notes from last 24 hours reviewed.    Relevant Results                  Scheduled medications  atorvastatin, 80 mg, oral, Nightly  insulin lispro, 0-5 Units, subcutaneous, TID  sennosides, 1 tablet, oral, Nightly  tamsulosin, 0.8 mg, oral, Nightly      Continuous medications     PRN medications  PRN medications: acetaminophen **OR** acetaminophen, alum-mag hydroxide-simeth, bisacodyl, bisacodyl, melatonin     Results for orders placed or performed during the hospital encounter of 09/08/24 (from the past 24 hour(s))   POCT GLUCOSE   Result Value Ref Range    POCT Glucose 148 (H) 74 - 99 mg/dL   Basic Metabolic Panel   Result Value Ref Range    Glucose 123 (H) 74 - 99 mg/dL    Sodium 139 136 - 145 mmol/L    Potassium 4.3 3.5 - 5.3 mmol/L    Chloride 109 (H) 98 - 107 mmol/L    Bicarbonate 23 21 - 32 mmol/L    Anion Gap 11 10 - 20 mmol/L    Urea Nitrogen 37 (H) 6 - 23 mg/dL    Creatinine 1.61 (H) 0.50 - 1.30 mg/dL    eGFR 42 (L) >60 mL/min/1.73m*2    " Calcium 9.1 8.6 - 10.3 mg/dL   CBC and Auto Differential   Result Value Ref Range    WBC 8.0 4.4 - 11.3 x10*3/uL    nRBC 0.0 0.0 - 0.0 /100 WBCs    RBC 2.48 (L) 4.50 - 5.90 x10*6/uL    Hemoglobin 7.5 (L) 13.5 - 17.5 g/dL    Hematocrit 24.9 (L) 41.0 - 52.0 %     80 - 100 fL    MCH 30.2 26.0 - 34.0 pg    MCHC 30.1 (L) 32.0 - 36.0 g/dL    RDW 15.7 (H) 11.5 - 14.5 %    Platelets 180 150 - 450 x10*3/uL    Neutrophils % 79.5 40.0 - 80.0 %    Immature Granulocytes %, Automated 0.4 0.0 - 0.9 %    Lymphocytes % 11.7 13.0 - 44.0 %    Monocytes % 5.7 2.0 - 10.0 %    Eosinophils % 2.5 0.0 - 6.0 %    Basophils % 0.2 0.0 - 2.0 %    Neutrophils Absolute 6.38 (H) 1.60 - 5.50 x10*3/uL    Immature Granulocytes Absolute, Automated 0.03 0.00 - 0.50 x10*3/uL    Lymphocytes Absolute 0.94 0.80 - 3.00 x10*3/uL    Monocytes Absolute 0.46 0.05 - 0.80 x10*3/uL    Eosinophils Absolute 0.20 0.00 - 0.40 x10*3/uL    Basophils Absolute 0.02 0.00 - 0.10 x10*3/uL   SST TOP   Result Value Ref Range    Extra Tube Hold for add-ons.    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  -Start speech therapy for swallowing cognition  -Will discuss with family regards stroke prophylaxis and  starting of anticoagulation due to recent GI bleed.  -Will follow-up with GI.  -Continue statin  -Continue blood pressure management, add medications as appropriate  Estimated length of stay is 20 days discharge with intermittent assist.  CVA (cerebrovascular accident due to intracerebral hemorrhage) (Multi)  -Continue plan of care as noted above     Anemia  -History of GI bleed.  -Will continue to follow H&H and treat as indicated  -Await starting anticoagulation until stable     Chronic diastolic heart failure (Multi)  -Monitor current cardiac function.  Currently on no medications for his cardiac dysfunction     Diabetes mellitus (Multi)  -Monitor blood sugars and adjust medications as indicated.     Essential hypertension, benign  -Monitor blood pressure and treat as indicated     History of malignant neoplasm of colon  -History of GI bleed.  Will follow-up with GI.  -Questionable pending anticoagulation     Longstanding persistent atrial fibrillation (Multi)  -Monitor heart rate and adjust medications as appropriate  -Pending decision for anticoagulation due to risk of GI bleed     Obesity (BMI 30-39.9)  -Monitor diet and continue supportive nutrition.  Atrial fibrillation (Multi)  -Hold anticoagulation at this time until clarification from family.  -Neurology was to start Eliquis.  Will connect the teams.  -Will obtain GI input if indicated.     Urinary retention  -on flomax  -look at TOV in the near future and plan of care with [facility]  -facilitate active BM  -increase moblity     Bowel/Bladder  -facilitate daily active BM   -continence of bladder per timed void schedule and rehab nursing  -check PVRs and treat appropriately     DVT prophylaxis  -SCDs and ambulation  -Chemoprophylaxis on hold due to risk of GI bleed.     FEN  -follow BMP and treat appropriately  -monitor oral intake daily    9/12  -Ongoing rehab with PT OT and speech therapy  -Continue to monitor blood sugars and sliding scale insulin.  Within  acceptable ranges at this point  -Underlying anemia but stable at this point with H&H 7.5 and 24.9  -Question of anticoagulation.  Patient was cleared by GI, cardiology and neurology to restart.  Will have discussion with family.  -Per PCP patient is not going to start anticoagulation unless requested by the family.  -Continue to monitor progress, CBC, BMP and vitals.  -Chronic renal insufficiency.  His creatinine to 1.61 is back to a baseline of where it was in 2021.  His creatinine was up to 3.07 on 9/2/24.    I was present and led the team conference. The plan of care was developed and agreed upon as discussed and documented during the team meeting.  See separate Note.    Team conference today with the rehab team - PT/OT/ST, SW, RN, Dietary, Case Management. Additional separate note in the chart for today. Over 35 min spent with paint care, team meetings, and coordination of care.         I spent 35 minutes in the professional and overall care of this patient.      Allison Díaz MD

## 2024-09-12 NOTE — CARE PLAN
Long discussion with the patient and the wife, Meg, regarding the benefits and risks for starting Eliquis for Stroke prophylaxis. The patient does not want another stroke. His wife and he opted to trial eliquis with ongoing monitoring of his H/H. He and his wife are aware of the increase risks of bleeding. He was educated that he has low blood counts currently. We reviewed his last EGD testing at [facility] and they noted there is not an identified lesion where he is bleeding. They also noted his hematuria in the past.    If there are any signs of bleeding or drop in H/H Eliquis will be stopped.     We also had a long discussion regarding the plan for DC. Both the patient and his wife were educated that the patient will need support for mobility at the time of DC now and most likely when he is DC from a SNF. He will require more assist than his wife can provide. He will need ongoing support due to his limitations in mobility and high risks of falls.    His granddaughter Emmy was in the room and heard the discussion.

## 2024-09-12 NOTE — CARE PLAN
The patient's goals for the shift include      The clinical goals for the shift include HDS/ safety

## 2024-09-13 LAB
ANION GAP SERPL CALC-SCNC: 10 MMOL/L (ref 10–20)
BASOPHILS # BLD AUTO: 0.04 X10*3/UL (ref 0–0.1)
BASOPHILS NFR BLD AUTO: 0.5 %
BUN SERPL-MCNC: 40 MG/DL (ref 6–23)
CALCIUM SERPL-MCNC: 9.1 MG/DL (ref 8.6–10.3)
CHLORIDE SERPL-SCNC: 108 MMOL/L (ref 98–107)
CO2 SERPL-SCNC: 24 MMOL/L (ref 21–32)
CREAT SERPL-MCNC: 1.28 MG/DL (ref 0.5–1.3)
EGFRCR SERPLBLD CKD-EPI 2021: 55 ML/MIN/1.73M*2
EOSINOPHIL # BLD AUTO: 0.22 X10*3/UL (ref 0–0.4)
EOSINOPHIL NFR BLD AUTO: 2.5 %
ERYTHROCYTE [DISTWIDTH] IN BLOOD BY AUTOMATED COUNT: 15.7 % (ref 11.5–14.5)
GLUCOSE BLD MANUAL STRIP-MCNC: 118 MG/DL (ref 74–99)
GLUCOSE BLD MANUAL STRIP-MCNC: 154 MG/DL (ref 74–99)
GLUCOSE BLD MANUAL STRIP-MCNC: 204 MG/DL (ref 74–99)
GLUCOSE SERPL-MCNC: 123 MG/DL (ref 74–99)
HCT VFR BLD AUTO: 25.7 % (ref 41–52)
HGB BLD-MCNC: 7.8 G/DL (ref 13.5–17.5)
IMM GRANULOCYTES # BLD AUTO: 0.04 X10*3/UL (ref 0–0.5)
IMM GRANULOCYTES NFR BLD AUTO: 0.5 % (ref 0–0.9)
LYMPHOCYTES # BLD AUTO: 1 X10*3/UL (ref 0.8–3)
LYMPHOCYTES NFR BLD AUTO: 11.5 %
MCH RBC QN AUTO: 30.4 PG (ref 26–34)
MCHC RBC AUTO-ENTMCNC: 30.4 G/DL (ref 32–36)
MCV RBC AUTO: 100 FL (ref 80–100)
MONOCYTES # BLD AUTO: 0.51 X10*3/UL (ref 0.05–0.8)
MONOCYTES NFR BLD AUTO: 5.9 %
NEUTROPHILS # BLD AUTO: 6.89 X10*3/UL (ref 1.6–5.5)
NEUTROPHILS NFR BLD AUTO: 79.1 %
NRBC BLD-RTO: 0 /100 WBCS (ref 0–0)
PLATELET # BLD AUTO: 181 X10*3/UL (ref 150–450)
POTASSIUM SERPL-SCNC: 4.3 MMOL/L (ref 3.5–5.3)
RBC # BLD AUTO: 2.57 X10*6/UL (ref 4.5–5.9)
SODIUM SERPL-SCNC: 138 MMOL/L (ref 136–145)
WBC # BLD AUTO: 8.7 X10*3/UL (ref 4.4–11.3)

## 2024-09-13 PROCEDURE — 80048 BASIC METABOLIC PNL TOTAL CA: CPT | Performed by: PHYSICAL MEDICINE & REHABILITATION

## 2024-09-13 PROCEDURE — 82947 ASSAY GLUCOSE BLOOD QUANT: CPT

## 2024-09-13 PROCEDURE — 97112 NEUROMUSCULAR REEDUCATION: CPT | Mod: GO

## 2024-09-13 PROCEDURE — 92526 ORAL FUNCTION THERAPY: CPT | Mod: GN

## 2024-09-13 PROCEDURE — 92507 TX SP LANG VOICE COMM INDIV: CPT | Mod: GN

## 2024-09-13 PROCEDURE — 97116 GAIT TRAINING THERAPY: CPT | Mod: GP

## 2024-09-13 PROCEDURE — 2500000001 HC RX 250 WO HCPCS SELF ADMINISTERED DRUGS (ALT 637 FOR MEDICARE OP): Performed by: PHYSICAL MEDICINE & REHABILITATION

## 2024-09-13 PROCEDURE — 2500000002 HC RX 250 W HCPCS SELF ADMINISTERED DRUGS (ALT 637 FOR MEDICARE OP, ALT 636 FOR OP/ED): Performed by: PHYSICAL MEDICINE & REHABILITATION

## 2024-09-13 PROCEDURE — 97129 THER IVNTJ 1ST 15 MIN: CPT | Mod: GN

## 2024-09-13 PROCEDURE — 36415 COLL VENOUS BLD VENIPUNCTURE: CPT | Performed by: PHYSICAL MEDICINE & REHABILITATION

## 2024-09-13 PROCEDURE — 97535 SELF CARE MNGMENT TRAINING: CPT | Mod: GO

## 2024-09-13 PROCEDURE — 97130 THER IVNTJ EA ADDL 15 MIN: CPT | Mod: GN

## 2024-09-13 PROCEDURE — 85025 COMPLETE CBC W/AUTO DIFF WBC: CPT | Performed by: PHYSICAL MEDICINE & REHABILITATION

## 2024-09-13 PROCEDURE — 51701 INSERT BLADDER CATHETER: CPT

## 2024-09-13 PROCEDURE — 97530 THERAPEUTIC ACTIVITIES: CPT | Mod: GO

## 2024-09-13 PROCEDURE — 97110 THERAPEUTIC EXERCISES: CPT | Mod: GP

## 2024-09-13 PROCEDURE — 1180000001 HC REHAB PRIVATE ROOM DAILY

## 2024-09-13 PROCEDURE — 99231 SBSQ HOSP IP/OBS SF/LOW 25: CPT | Performed by: PHYSICAL MEDICINE & REHABILITATION

## 2024-09-13 PROCEDURE — 97530 THERAPEUTIC ACTIVITIES: CPT | Mod: GP

## 2024-09-13 ASSESSMENT — PAIN SCALES - GENERAL
PAINLEVEL_OUTOF10: 0 - NO PAIN
PAINLEVEL_OUTOF10: 3
PAINLEVEL_OUTOF10: 0 - NO PAIN

## 2024-09-13 ASSESSMENT — PAIN - FUNCTIONAL ASSESSMENT
PAIN_FUNCTIONAL_ASSESSMENT: 0-10

## 2024-09-13 ASSESSMENT — ACTIVITIES OF DAILY LIVING (ADL)
HOME_MANAGEMENT_TIME_ENTRY: 35
BATHING_WHERE_ASSESSED: SITTING SINKSIDE
BATHING_LEVEL_OF_ASSISTANCE: MAXIMUM ASSISTANCE;MAXIMUM VERBAL CUES

## 2024-09-13 ASSESSMENT — PAIN DESCRIPTION - LOCATION: LOCATION: ARM

## 2024-09-13 ASSESSMENT — PAIN SCALES - PAIN ASSESSMENT IN ADVANCED DEMENTIA (PAINAD): TOTALSCORE: MEDICATION (SEE MAR)

## 2024-09-13 ASSESSMENT — PAIN DESCRIPTION - ORIENTATION: ORIENTATION: LEFT

## 2024-09-13 NOTE — PROGRESS NOTES
"Speech-Language Pathology    SLP Adult IRF Speech-Language Pathology Treatment     Patient Name: Regulo Marques  MRN: [id number]  Today's Date: 9/13/2024  Time Calculation  Start Time: 1000  Stop Time: 1045  Time Calculation (min): 45 min       SLP Assessment:  SLP TX Intervention Outcome: Making Progress Towards Goals  Prognosis: Good  Treatment Tolerance: Patient tolerated treatment well  Strengths: Motivation, Family/Caregiver Support  Barriers: Comorbidities      COGNITIVE LTG: established 9/9/24              1. Patient will demonstrate improved cognition for return home at a min assist level of care; PROGRESSING  DYSPHAGIA LTG: estabished 9/9/24   Patient will tolerate the LRD without clinical s/s of dysphagia; PROGRESSING        Plan:  SLP TX Plan: Continue Plan of Care  SLP Discharge Recommendations: Home SLP  Discussed POC: Patient  Patient/Caregiver Agreeable: Yes      Subjective   Current Problem:  Dysphagia/dysarthria/cognitive deficits    Most Recent Visit:   SLP Received On: 9/13/24    General Visit Information:  Patient cooperative and participatory.       Pain Assessment:  Pain Assessment  Pain Assessment: 0-10  0-10 (Numeric) Pain Score: 0 - No pain      Objective       Motor Speech:   Motor Speech Intervention : Sentence Repetitions, Compensatory Speech Strategies    Use of speech strategies during oral reading of sentences with 95% accuracy. Improved speech intelligibility during both structured speech tasks and during conversational speech with SLP. Pt reports wife can understand pt better over the phone as well.     Cognitive Linguistics:  Cognitive Linguistics Interventions: Sequencing, Problem solving, Reasoning, Attention    Telling time on Ipad clocks today with 34% accuracy. More challenging for pt today and getting confused with hour hand \"not quite\" on the next hour yet.     Sequencing steps for functional task on Ipad with 90% accuracy.     Attention task for matching numbers to letters to " spell out single words with 95% accuracy.     Improving overall.     Inpatient:  Education Documentation  Education provided as it relates to POC and progress.  Individual(s) Educated: Patient  Verbal Education : yes  Patient Demonstrated Understanding: yes  Plan of Care Discussed and Agreed Upon: yes

## 2024-09-13 NOTE — PROGRESS NOTES
Physical Therapy    Physical Therapy Treatment    Patient Name: Regulo Marques  MRN: [id number]  Department: [facility]  Room: [address] [place], [place] [address]  Today's Date: 9/13/2024  Time Calculation  Start Time: 1045  Stop Time: 1130  Time Calculation (min): 45 min         Assessment/Plan         PT Plan  Treatment/Interventions: Bed mobility, Transfer training, Gait training, Stair training, Balance training, Strengthening, Endurance training, Therapeutic activity, Therapeutic exercise, Neuromuscular re-education  PT Plan: Ongoing PT  PT Frequency: 5 times per week (6x prn)  PT Discharge Recommendations:  (Anticipate discharge home with the need for mod assist at the walker level and will recommend first floor set-up.)  Equipment Recommended upon Discharge: Wheeled walker  PT Recommended Transfer Status: Assist x2      General Visit Information:   PT  Visit  PT Received On: 09/13/24  General  Patient Position Received: Up in chair, Alarm on  General Comment: Pt fatigued this session    Subjective   Precautions:  Precautions  Hearing/Visual Limitations: [place]  Medical Precautions: Fall precautions, Swallowing precautions  Precautions Comment: Pureed diet and mildly thick liquids        Objective   Pain:  Pain Assessment  Pain Assessment: 0-10  0-10 (Numeric) Pain Score: 0 - No pain  Cognition:  Cognition  Impulsive:  (Pt is impulsive during all tasks and requires max cues to slow down.)  Coordination:  Coordination Comment: Ataxic LLE    Treatments:  Therapeutic Exercise  Therapeutic Exercise Performed:  (Pt performed BLE strength and coordination ex 2 x 10 reps each with intermittent assist at LLE to improve motor control and technique.)    Ambulation/Gait Training  Ambulation/Gait Training Performed:  (Pt amb 100 ft x 3 with wheeled walker and mod/max assist x2.  Gait training working on left and right turns and approach steps.  LLE abductor assist with red t-band, left hand  assist with dycem and velcro strap.  Gait is impulsive  and uncoordinated.  Total assist to steer, maneuver and control walker.)    Transfers  Transfer:  (Repeated sit to stand transfer technique with mod assist x2.  Pivot transfer training without a device to pt's weak side with mod/max assist x2.  Difficulty advancing the LLE and presents with significant left lean.)    EDUCATION:  Education Comment:  (Pt educated on safe mobility techniques and to decrease speed of movements.)    Encounter Problems       Encounter Problems (Active)       PT Problem       STG - Bed mobility with max assist x1.   (Progressing)       Start:  09/09/24    Expected End:  09/16/24            STG - Transfers with mod assist x2. (Progressing)       Start:  09/09/24    Expected End:  09/16/24            STG - Pt will amb 20 ft in a str line with approp device and mod assist x2.   (Progressing)       Start:  09/09/24    Expected End:  09/16/24            STG - Will determine most approp means of entry into home.   (Progressing)       Start:  09/09/24    Expected End:  09/16/24            LTG - Bed mobility with mod assist x1. (Progressing)       Start:  09/09/24    Expected End:  09/23/24            LTG - Transfers with mod assist x1. (Progressing)       Start:  09/09/24    Expected End:  09/23/24            LTG - Pt will amb 50 ft with approp device and mod assist x1. (Progressing)       Start:  09/09/24    Expected End:  09/23/24            LTG - Will educate pt and family on most approp means of entry into home.   (Progressing)       Start:  09/09/24    Expected End:  09/23/24

## 2024-09-13 NOTE — PROGRESS NOTES
Occupational Therapy    OT Treatment    Patient Name: Regulo Marques  MRN: [id number]  Department: [facility]  Room: [address] [place] [address]  Today's Date: 9/13/2024  Time Calculation  Start Time: 0915  Stop Time: 1000  Time Calculation (min): 45 min        Assessment:  End of Session Communication:  (handoff to ST)  End of Session Patient Position: Up in chair, Alarm on     Plan:  Treatment Interventions: ADL retraining, Endurance training, Patient/family training, Neuromuscular reeducation, Functional transfer training, Visual perceptual retraining, UE strengthening/ROM, Cognitive reorientation  OT Frequency:  (5 days/week, 90 mins/day, for 2 weeks)  OT Discharge Recommendations:  (anticipate need for up to mod assist with transfers, ADLs, and mobility)  Equipment Recommended upon Discharge: Wheeled walker, Bedside commode (tub bench, reacher)  Treatment Interventions: ADL retraining, Endurance training, Patient/family training, Neuromuscular reeducation, Functional transfer training, Visual perceptual retraining, UE strengthening/ROM, Cognitive reorientation    Subjective   Previous Visit Info:  OT Last Visit  OT Received On: 09/13/24    General:  General  Prior to Session Communication: Bedside nurse  Patient Position Received: Up in chair, Alarm on  General Comment: patient up in chair and agreeable to complete ADLs this session    Precautions:  Medical Precautions: Fall precautions, Swallowing precautions        Pain:  Pain Assessment  Pain Assessment: 0-10  0-10 (Numeric) Pain Score: 0 - No pain    Objective       Therapy/Activity: Balance/Neuromuscular Re-Education  Balance/Neuromuscular Re-Education Activity Performed:    Completes task with focus on improving LUE GMC/FMC/grasp and release:   -rolls out medium resistance with handled wheel with max cues to maintain left grasp on handle of wheel; holds large cone and pushes into theraputty with mod difficulty)  -unable to effectively pinch open clothespin to place on  vertical dowel rafita, unable to maintain pinch; patient able to remove clothespins from dowel rafita with min/mod difficulty; cues to slow pace and focus on task, continues to be highly impulsive  - inserts large pegs into pegboard with max difficulty; removes pegs from pegboard with min/mod difficulty; patient places pegs into box on right side of table to encourage right anterior and lateral lean/weight-shifting     EDUCATION:  Education  Individual(s) Educated: Patient  Education Provided:  (techniques to improve LUE function and attention)  Patient Response to Education:  (patient with poor-fair follow through; requires continued education)    Goals:  Encounter Problems       Encounter Problems (Active)       OT Problem       LTG - Patient will complete eating with set up. (Progressing)       Start:  09/09/24    Expected End:  09/23/24            LTG - Patient will complete grooming with sba. (Progressing)       Start:  09/09/24    Expected End:  09/23/24            LTG - Patient will complete toileting with mod assist. (Progressing)       Start:  09/09/24    Expected End:  09/23/24            LTG - Patient will complete bathing with min/mod assist. (Progressing)       Start:  09/09/24    Expected End:  09/23/24            LTG - Patient will complete UE dressing with sba. (Progressing)       Start:  09/09/24    Expected End:  09/23/24            LTG - Patient will complete LE dressing using adaptive equip as needed with mod assist. (Progressing)       Start:  09/09/24    Expected End:  09/23/24            LTG - Patient will complete commode transfer via pivot or device as needed with mod assist. (Progressing)       Start:  09/09/24    Expected End:  09/23/24            LTG - Patient will complete tub/shower transfer using DME as needed with mod/max assist. (Progressing)       Start:  09/09/24    Expected End:  09/23/24            LTG - Patient will complete simple mobility using device as needed with mod assist.  (Progressing)       Start:  09/09/24    Expected End:  09/23/24            LTG - Patient will complete wheelchair mobility with sba. (Progressing)       Start:  09/09/24    Expected End:  09/23/24            STG - Patient will complete grooming with min assist. (Progressing)       Start:  09/09/24    Expected End:  09/18/24            STG - Patient will complete toileting with mod assist x2. (Progressing)       Start:  09/09/24    Expected End:  09/18/24            STG - Patient will complete sponge bathing with mod/max assist. (Progressing)       Start:  09/09/24    Expected End:  09/18/24            STG - Patient will complete UE dressing with mod assist. (Met)       Start:  09/09/24    Expected End:  09/16/24    Resolved:  09/11/24    Updated to: STG - Patient will complete UE dressing with min assist.    Update reason: STG met         STG - Patient will complete LE dressing using adaptive equip as needed with mod assist x2. (Met)       Start:  09/09/24    Expected End:  09/16/24    Resolved:  09/11/24    Updated to: STG - Patient will complete LE dressing using adaptive equip as needed with min assist x2.    Update reason: STG met         STG - Patient will complete commode transfer via device as needed with min assist x2. (Progressing)       Start:  09/09/24    Expected End:  09/18/24            STG - Patient will complete simple wheelchair mobility with min assist. (Progressing)       Start:  09/09/24    Expected End:  09/18/24            STG - Patient will complete UE dressing with min assist. (Progressing)       Start:  09/11/24    Expected End:  09/18/24                STG - Patient will complete LE dressing using adaptive equip as needed with min assist x2. (Progressing)       Start:  09/11/24    Expected End:  09/18/24

## 2024-09-13 NOTE — CARE PLAN
Department  notified of request for ayala Olsen referrals started. Assigned CM/SW to follow up with pt/family on further discharge planning.      8/30  Andie Garrison   August 30, 2023   10:39 The patient's goals for the shift include      The clinical goals for the shift include remain hemodynamically stable

## 2024-09-13 NOTE — PROGRESS NOTES
"  Regulo Marques is a 83 y.o. 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    Subjective   Patient's sleeping this morning.  Once awake no specific complaints.  Long discussion last evening.  He was started on Eliquis this morning.  Will continue to monitor his H&H.  Nursing and family are very aware of current risks and what were monitoring.       Objective       Physical Exam  Pleasant male resting comfortably  Chest clear to auscultation bilaterally  Cardiovascular S1-S2  Abdomen soft nontender nondistended with active bowel sounds  Negative Homans bilaterally  Left hemiparesis with left inattention and neglect.  Function: Mod assist of 2 to max assist.  Assessment/Plan        Last Recorded Vitals  Blood pressure 137/63, pulse 53, temperature 36.3 °C (97.3 °F), resp. rate 16, height 1.753 m (5' 9.02\"), weight 93 kg (205 lb 0.4 oz), SpO2 99%.    Therapy notes from last 24 hours reviewed.    Relevant Results                  Scheduled medications  apixaban, 2.5 mg, oral, BID  atorvastatin, 80 mg, oral, Nightly  insulin lispro, 0-5 Units, subcutaneous, TID  sennosides, 1 tablet, oral, Nightly  tamsulosin, 0.8 mg, oral, Nightly      Continuous medications     PRN medications  PRN medications: acetaminophen **OR** acetaminophen, alum-mag hydroxide-simeth, bisacodyl, bisacodyl, melatonin     Results for orders placed or performed during the hospital encounter of 09/08/24 (from the past 24 hour(s))   POCT GLUCOSE   Result Value Ref Range    POCT Glucose 164 (H) 74 - 99 mg/dL   POCT GLUCOSE   Result Value Ref Range    POCT Glucose 164 (H) 74 - 99 mg/dL   Basic Metabolic Panel   Result Value Ref Range    Glucose 123 (H) 74 - 99 mg/dL    Sodium 138 136 - 145 mmol/L    Potassium 4.3 3.5 - 5.3 mmol/L    Chloride 108 (H) 98 - 107 mmol/L    Bicarbonate 24 21 - 32 mmol/L    Anion Gap 10 10 - 20 mmol/L    Urea Nitrogen 40 (H) 6 - 23 mg/dL    Creatinine 1.28 0.50 " - 1.30 mg/dL    eGFR 55 (L) >60 mL/min/1.73m*2    Calcium 9.1 8.6 - 10.3 mg/dL   CBC and Auto Differential   Result Value Ref Range    WBC 8.7 4.4 - 11.3 x10*3/uL    nRBC 0.0 0.0 - 0.0 /100 WBCs    RBC 2.57 (L) 4.50 - 5.90 x10*6/uL    Hemoglobin 7.8 (L) 13.5 - 17.5 g/dL    Hematocrit 25.7 (L) 41.0 - 52.0 %     80 - 100 fL    MCH 30.4 26.0 - 34.0 pg    MCHC 30.4 (L) 32.0 - 36.0 g/dL    RDW 15.7 (H) 11.5 - 14.5 %    Platelets 181 150 - 450 x10*3/uL    Neutrophils % 79.1 40.0 - 80.0 %    Immature Granulocytes %, Automated 0.5 0.0 - 0.9 %    Lymphocytes % 11.5 13.0 - 44.0 %    Monocytes % 5.9 2.0 - 10.0 %    Eosinophils % 2.5 0.0 - 6.0 %    Basophils % 0.5 0.0 - 2.0 %    Neutrophils Absolute 6.89 (H) 1.60 - 5.50 x10*3/uL    Immature Granulocytes Absolute, Automated 0.04 0.00 - 0.50 x10*3/uL    Lymphocytes Absolute 1.00 0.80 - 3.00 x10*3/uL    Monocytes Absolute 0.51 0.05 - 0.80 x10*3/uL    Eosinophils Absolute 0.22 0.00 - 0.40 x10*3/uL    Basophils Absolute 0.04 0.00 - 0.10 x10*3/uL   POCT GLUCOSE   Result Value Ref Range    POCT Glucose 118 (H) 74 - 99 mg/dL                 Assessment/Plan   Principal Problem:    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  -Start speech therapy for swallowing cognition  -Will discuss with family regards stroke prophylaxis and starting of anticoagulation due to recent GI bleed.  -Will follow-up with GI.  -Continue  statin  -Continue blood pressure management, add medications as appropriate  Estimated length of stay is 20 days discharge with intermittent assist.  CVA (cerebrovascular accident due to intracerebral hemorrhage) (Multi)  -Continue plan of care as noted above     Anemia  -History of GI bleed.  -Will continue to follow H&H and treat as indicated  -Await starting anticoagulation until stable     Chronic diastolic heart failure (Multi)  -Monitor current cardiac function.  Currently on no medications for his cardiac dysfunction     Diabetes mellitus (Multi)  -Monitor blood sugars and adjust medications as indicated.     Essential hypertension, benign  -Monitor blood pressure and treat as indicated     History of malignant neoplasm of colon  -History of GI bleed.  Will follow-up with GI.  -Questionable pending anticoagulation     Longstanding persistent atrial fibrillation (Multi)  -Monitor heart rate and adjust medications as appropriate  -Pending decision for anticoagulation due to risk of GI bleed     Obesity (BMI 30-39.9)  -Monitor diet and continue supportive nutrition.  Atrial fibrillation (Multi)  -Hold anticoagulation at this time until clarification from family.  -Neurology was to start Eliquis.  Will connect the teams.  -Will obtain GI input if indicated.     Urinary retention  -on flomax  -look at TOV in the near future and plan of care with [facility]  -facilitate active BM  -increase moblity     Bowel/Bladder  -facilitate daily active BM   -continence of bladder per timed void schedule and rehab nursing  -check PVRs and treat appropriately     DVT prophylaxis  -SCDs and ambulation  -Chemoprophylaxis on hold due to risk of GI bleed.     FEN  -follow BMP and treat appropriately  -monitor oral intake daily    9/13  -Continue to monitor PVRs with intermittent straight cath as needed  -Continue to follow blood pressure and adjust medications as appropriate.  Current blood pressure within acceptable ranges  -Start  Eliquis and monitor H&H.  Current H&H is 7.8 and 25.7.  Will stop Eliquis if any bleeding appreciated  -Continue to monitor blood sugars and adjust as appropriate with sliding scale insulin  -Continue Flomax nightly    Discharge planning with wife and family.  He will need ongoing rehab postacute rehab at a skilled facility.  Goal then would be transfer to a assisted living for ongoing support.       I spent 25 minutes in the professional and overall care of this patient.      Allison Díaz MD

## 2024-09-13 NOTE — PROGRESS NOTES
Occupational Therapy    OT Treatment    Patient Name: Regulo Marques  MRN: [id number]  Department: [facility] REHAB  Room: [facility]468  Today's Date: 9/13/2024  Time Calculation  Start Time: 0745  Stop Time: 0830  Time Calculation (min): 45 min        Assessment:  End of Session Communication: Bedside nurse  End of Session Patient Position: Up in chair, Alarm on     Plan:  Treatment Interventions: ADL retraining, Endurance training, Patient/family training, Neuromuscular reeducation, Functional transfer training, Visual perceptual retraining, UE strengthening/ROM, Cognitive reorientation  OT Frequency:  (5 days/week, 90 mins/day, for 2 weeks)  OT Discharge Recommendations:  (anticipate need for up to mod assist with transfers, ADLs, and mobility)  Equipment Recommended upon Discharge: Wheeled walker, Bedside commode (tub bench, reacher)  Treatment Interventions: ADL retraining, Endurance training, Patient/family training, Neuromuscular reeducation, Functional transfer training, Visual perceptual retraining, UE strengthening/ROM, Cognitive reorientation    Subjective   Previous Visit Info:  OT Last Visit  OT Received On: 09/13/24      General:  General  Prior to Session Communication: Bedside nurse  Patient Position Received: Up in chair, Alarm on  General Comment: patient up in chair and agreeable to complete ADLs this session    Precautions:  Medical Precautions: Fall precautions, Swallowing precautions        Pain:  Pain Assessment  Pain Assessment: 0-10  0-10 (Numeric) Pain Score: 0 - No pain    Objective         Activities of Daily Living: Grooming  Grooming Comments: patient requiring total assist to open toothpaste and apply to toothbrush; despite step-by-step cues, patient unable to follow due to poor attention and apraxia; brushes teeth and uses mouthwash with sba   bush hair and washes face with right hand with sba; does not attempt to use left hand to assist    UE Bathing  UE Bathing Level of Assistance: Moderate  assistance  UE Bathing Where Assessed: Sitting sinkside  UE Bathing Comments: seated sponge bath    LE Bathing  LE Bathing Level of Assistance: Maximum assistance, Maximum verbal cues  LE Bathing Where Assessed: Sitting sinkside  LE Bathing Comments: seated sponge bath    UE Dressing  UE Dressing Level of Assistance: Maximum assistance  UE Dressing Where Assessed: Wheelchair  UE Dressing Comments: doffs t-shirt with min assist, impulsive (dons t-shirt and zip-up sweatshirt with max assist; patient does not follow cues for boubacar technique (poor attention, impulsive); apraxia also present)    LE Dressing  LE Dressing Adaptive Equipment:  (max assist to don shorts over feet; reacher attempted however discontinued due to poor ability to manage reacher and apraxia; total assist to pull pants over hips while standing, while requiring mod assist to maintain static standing at walker)       Therapy/Activity: Balance/Neuromuscular Re-Education  Balance/Neuromuscular Re-Education Activity Performed:  patient expressing difficulty using his cell phone since CVA; instructed patient on one-handed technique and possible use of stylus to improve independence. After education provided, patient continued to have difficulty unlocking phone and calling spouse; patient appearing to have difficulty d/t impaired cognition (unable to recall of steps needed to be completed to phone spouse). Patient also impulsively trying to press all icons/josh on phone versus carefully choosing correct icon/josh.    EDUCATION:  Education  Individual(s) Educated: Patient  Education Provided:  (safe ADLs and transfers; boubacar techniques for ADLs)  Patient Response to Education:  (patient with poor-fair understanding and follow through of ADLs and transfers; continues to require increased education)    Goals:  Encounter Problems       Encounter Problems (Active)       OT Problem       LTG - Patient will complete eating with set up. (Progressing)       Start:   09/09/24    Expected End:  09/23/24            LTG - Patient will complete grooming with sba. (Progressing)       Start:  09/09/24    Expected End:  09/23/24            LTG - Patient will complete toileting with mod assist. (Progressing)       Start:  09/09/24    Expected End:  09/23/24            LTG - Patient will complete bathing with min/mod assist. (Progressing)       Start:  09/09/24    Expected End:  09/23/24            LTG - Patient will complete UE dressing with sba. (Progressing)       Start:  09/09/24    Expected End:  09/23/24            LTG - Patient will complete LE dressing using adaptive equip as needed with mod assist. (Progressing)       Start:  09/09/24    Expected End:  09/23/24            LTG - Patient will complete commode transfer via pivot or device as needed with mod assist. (Progressing)       Start:  09/09/24    Expected End:  09/23/24            LTG - Patient will complete tub/shower transfer using DME as needed with mod/max assist. (Progressing)       Start:  09/09/24    Expected End:  09/23/24            LTG - Patient will complete simple mobility using device as needed with mod assist. (Progressing)       Start:  09/09/24    Expected End:  09/23/24            LTG - Patient will complete wheelchair mobility with sba. (Progressing)       Start:  09/09/24    Expected End:  09/23/24            STG - Patient will complete grooming with min assist. (Progressing)       Start:  09/09/24    Expected End:  09/18/24            STG - Patient will complete toileting with mod assist x2. (Progressing)       Start:  09/09/24    Expected End:  09/18/24            STG - Patient will complete sponge bathing with mod/max assist. (Progressing)       Start:  09/09/24    Expected End:  09/18/24            STG - Patient will complete UE dressing with mod assist. (Met)       Start:  09/09/24    Expected End:  09/16/24    Resolved:  09/11/24    Updated to: STG - Patient will complete UE dressing with min  assist.    Update reason: STG met         STG - Patient will complete LE dressing using adaptive equip as needed with mod assist x2. (Met)       Start:  09/09/24    Expected End:  09/16/24    Resolved:  09/11/24    Updated to: STG - Patient will complete LE dressing using adaptive equip as needed with min assist x2.    Update reason: STG met         STG - Patient will complete commode transfer via device as needed with min assist x2. (Progressing)       Start:  09/09/24    Expected End:  09/18/24            STG - Patient will complete simple wheelchair mobility with min assist. (Progressing)       Start:  09/09/24    Expected End:  09/18/24            STG - Patient will complete UE dressing with min assist. (Progressing)       Start:  09/11/24    Expected End:  09/18/24                STG - Patient will complete LE dressing using adaptive equip as needed with min assist x2. (Progressing)       Start:  09/11/24    Expected End:  09/18/24

## 2024-09-13 NOTE — PROGRESS NOTES
Physical Therapy    Bed mobility training:  Supine to sit with mod assist.  Pt seated edge of bed x3 minutes for static sitting balance activities with up to min assist.  Pivot transfer training without a device with mod assist x2 and cues for technique.

## 2024-09-13 NOTE — PROGRESS NOTES
Physical Therapy    Physical Therapy Treatment    Patient Name: Regulo Marques  MRN: [id number]  Department: [facility] REH[facility]  Room: [address] [place] [address]  Today's Date: 9/13/2024  Time Calculation  Start Time: 1300  Stop Time: 1345  Time Calculation (min): 45 min         Assessment/Plan         PT Plan  Treatment/Interventions: Bed mobility, Transfer training, Gait training, Stair training, Balance training, Strengthening, Endurance training, Therapeutic activity, Therapeutic exercise, Neuromuscular re-education  PT Plan: Ongoing PT  PT Frequency: 5 times per week (6x prn)  PT Discharge Recommendations:  (Anticipate discharge home with the need for mod assist at the walker level and will recommend first floor set-up.)  Equipment Recommended upon Discharge: Wheeled walker  PT Recommended Transfer Status: Assist x2      General Visit Information:   PT  Visit  PT Received On: 09/13/24  General  Patient Position Received:  (Up in recliner, alarm on)    Subjective   Precautions:  Precautions  Hearing/Visual Limitations: Crow  Medical Precautions: Fall precautions, Swallowing precautions  Precautions Comment: Pureed diet and mildly thick liquids    Objective   Pain:  Pain Assessment  Pain Assessment: 0-10  0-10 (Numeric) Pain Score: 0 - No pain  Cognition:  Cognition  Overall Cognitive Status:  (Pt able to follow simple commands when therapist using increased voice volume and repetition of commands.)  Impulsive:  (Pt is impulsive during all tasks and requires max cues to slow down.)  Coordination:  Coordination Comment: Ataxic LLE    Treatments:  Therapeutic Activity  Therapeutic Activity Performed:  (Pt standing at boubacar-bar and at bedrail for 1 minute and 43 seconds, 2 minutes and 53 seconds and 3 minutes x2 with min assist x2.  Focusing on trunk and cervical extension and midline awareness.  Standing activities involving marching in place and wt shifting laterally.)    Transfers  Transfer:  (Repeated sit to stand transfer training while  pushing up from chair with mod assist x2.  Pivot transfer training to the left and right with mod assist x2.)    EDUCATION:  Education Comment:  (Pt educated on safe mobility techniques, postural awareness and goals of treatment.)    Encounter Problems       Encounter Problems (Active)       PT Problem       STG - Bed mobility with max assist x1.   (Progressing)       Start:  09/09/24    Expected End:  09/16/24            STG - Transfers with mod assist x2. (Progressing)       Start:  09/09/24    Expected End:  09/16/24            STG - Pt will amb 20 ft in a str line with approp device and mod assist x2.   (Progressing)       Start:  09/09/24    Expected End:  09/16/24            STG - Will determine most approp means of entry into home.   (Progressing)       Start:  09/09/24    Expected End:  09/16/24            LTG - Bed mobility with mod assist x1. (Progressing)       Start:  09/09/24    Expected End:  09/23/24            LTG - Transfers with mod assist x1. (Progressing)       Start:  09/09/24    Expected End:  09/23/24            LTG - Pt will amb 50 ft with approp device and mod assist x1. (Progressing)       Start:  09/09/24    Expected End:  09/23/24            LTG - Will educate pt and family on most approp means of entry into home.   (Progressing)       Start:  09/09/24    Expected End:  09/23/24

## 2024-09-13 NOTE — CARE PLAN
The patient's goals for the shift include  to urinate on his own    The clinical goals for the shift include maintain bladder scan <400

## 2024-09-13 NOTE — PROGRESS NOTES
Speech-Language Pathology    SLP Adult IRF Speech-Language Pathology Treatment     Patient Name: Regulo Marques  MRN: [id number]  Today's Date: 9/13/2024  Time Calculation  Start Time: 1130  Stop Time: 1150  Time Calculation (min): 20 min       SLP Assessment:  SLP TX Intervention Outcome: Making Progress Towards Goals  Prognosis: Good  Treatment Tolerance: Patient tolerated treatment well  Strengths: Motivation, Family/Caregiver Support  Barriers: Comorbidities    DYSPHAGIA LTG: estabished 9/9/24   Patient will tolerate the LRD without clinical s/s of dysphagia; PROGRESSING     Plan:  SLP TX Plan: Continue Plan of Care  SLP Discharge Recommendations: Home SLP  Discussed POC: Patient  Patient/Caregiver Agreeable: Yes      Subjective   Current Problem:  Dysphagia    Most Recent Visit:  SLP Received On: 09/13/24    General Visit Information:  Cooperative but does not like vegetables on tray. Sitting in recliner in room for lunch.       Pain Assessment:  Pain Assessment  Pain Assessment: 0-10  0-10 (Numeric) Pain Score: 0 - No pain  Clinical Progression: Gradually improving      Objective       Therapeutic Swallow:  Therapeutic Swallow Intervention : PO Trials, Compensatory Strategies  Solid Diet Recommendations: Soft & bite sized/chopped (IDDSI Level 6)  Liquid Diet Recommendations: Nectar thick/mildly thick (IDDS Level 2)    Pt remains somewhat impulsive during meals. Requires cues to slow rate of intake and swallow prior to next bite. Tolerating current diet well with use of strategies. Ongoing cues for small, single sips of liquid. No overt or subtle s/s of penetration and/or aspiration.           Inpatient:  Education Documentation  Education provided as it relates to POC and progress.   Patient/Caregiver Demonstrated Understanding: yes  Plan of Care Discussed and Agreed Upon: yes

## 2024-09-14 LAB
GLUCOSE BLD MANUAL STRIP-MCNC: 124 MG/DL (ref 74–99)
GLUCOSE BLD MANUAL STRIP-MCNC: 142 MG/DL (ref 74–99)
GLUCOSE BLD MANUAL STRIP-MCNC: 174 MG/DL (ref 74–99)

## 2024-09-14 PROCEDURE — 99232 SBSQ HOSP IP/OBS MODERATE 35: CPT | Performed by: PHYSICAL MEDICINE & REHABILITATION

## 2024-09-14 PROCEDURE — 97530 THERAPEUTIC ACTIVITIES: CPT | Mod: GP

## 2024-09-14 PROCEDURE — 97110 THERAPEUTIC EXERCISES: CPT | Mod: GP

## 2024-09-14 PROCEDURE — 97535 SELF CARE MNGMENT TRAINING: CPT | Mod: GO

## 2024-09-14 PROCEDURE — 97116 GAIT TRAINING THERAPY: CPT | Mod: GP

## 2024-09-14 PROCEDURE — 1180000001 HC REHAB PRIVATE ROOM DAILY

## 2024-09-14 PROCEDURE — 82947 ASSAY GLUCOSE BLOOD QUANT: CPT

## 2024-09-14 PROCEDURE — 2500000001 HC RX 250 WO HCPCS SELF ADMINISTERED DRUGS (ALT 637 FOR MEDICARE OP): Performed by: PHYSICAL MEDICINE & REHABILITATION

## 2024-09-14 PROCEDURE — 2500000002 HC RX 250 W HCPCS SELF ADMINISTERED DRUGS (ALT 637 FOR MEDICARE OP, ALT 636 FOR OP/ED): Performed by: PHYSICAL MEDICINE & REHABILITATION

## 2024-09-14 ASSESSMENT — PAIN SCALES - GENERAL
PAINLEVEL_OUTOF10: 0 - NO PAIN
PAINLEVEL_OUTOF10: 0 - NO PAIN

## 2024-09-14 ASSESSMENT — ACTIVITIES OF DAILY LIVING (ADL): HOME_MANAGEMENT_TIME_ENTRY: 30

## 2024-09-14 ASSESSMENT — PAIN - FUNCTIONAL ASSESSMENT
PAIN_FUNCTIONAL_ASSESSMENT: 0-10
PAIN_FUNCTIONAL_ASSESSMENT: 0-10

## 2024-09-14 NOTE — PROGRESS NOTES
"  Regulo Marques is a 83 y.o. 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    Subjective   Discussed use of anticoagulation for treatment of afib.    Bleeding on the top of his head. Stopped.       Objective         Physical Exam  Pleasant male in no apparent distress.  He does lean to the left with some left inattention  Chest clear to auscultation bilaterally  Cardiovascular S1-S2 irregular  Abdomen soft nontender nondistended with active bowel signs  Negative Homans bilaterally.  Mild PVD changes noted.  Left hemiparesis with decreased motor control.  Function: Moderate to max assist.  Assessment/Plan        Last Recorded Vitals  Blood pressure 118/58, pulse 58, temperature 36.7 °C (98.1 °F), temperature source Temporal, resp. rate 18, height 1.753 m (5' 9.02\"), weight 93 kg (205 lb 0.4 oz), SpO2 95%.    Therapy notes from last 24 hours reviewed.    Relevant Results                  Scheduled medications  apixaban, 2.5 mg, oral, BID  atorvastatin, 80 mg, oral, Nightly  insulin lispro, 0-5 Units, subcutaneous, TID  sennosides, 1 tablet, oral, Nightly  tamsulosin, 0.8 mg, oral, Nightly      Continuous medications     PRN medications  PRN medications: acetaminophen **OR** acetaminophen, alum-mag hydroxide-simeth, bisacodyl, bisacodyl, melatonin     Results for orders placed or performed during the hospital encounter of 09/08/24 (from the past 24 hour(s))   POCT GLUCOSE   Result Value Ref Range    POCT Glucose 154 (H) 74 - 99 mg/dL   POCT GLUCOSE   Result Value Ref Range    POCT Glucose 124 (H) 74 - 99 mg/dL   POCT GLUCOSE   Result Value Ref Range    POCT Glucose 174 (H) 74 - 99 mg/dL                 Assessment/Plan   Principal Problem:    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  -Start speech therapy for swallowing cognition  -Will discuss with family regards stroke prophylaxis and starting of anticoagulation due to recent GI bleed.  -Will follow-up with GI.  -Continue statin  -Continue blood pressure management, add medications as appropriate  Estimated length of stay is 20 days discharge with intermittent assist.  CVA (cerebrovascular accident due to intracerebral hemorrhage) (Multi)  -Continue plan of care as noted above     Anemia  -History of GI bleed.  -Will continue to follow H&H and treat as indicated  -Await starting anticoagulation until stable     Chronic diastolic heart failure (Multi)  -Monitor current cardiac function.  Currently on no medications for his cardiac dysfunction     Diabetes mellitus (Multi)  -Monitor blood sugars and adjust medications as indicated.     Essential hypertension, benign  -Monitor blood pressure and treat as indicated     History of malignant neoplasm of colon  -History of GI bleed.  Will follow-up with GI.  -Questionable pending anticoagulation     Longstanding persistent atrial fibrillation (Multi)  -Monitor heart rate and adjust medications as appropriate  -Pending decision for anticoagulation due to risk of GI bleed     Obesity (BMI 30-39.9)  -Monitor diet and continue supportive nutrition.  Atrial fibrillation (Multi)  -Hold anticoagulation at this time until clarification from family.  -Neurology was to start Eliquis.  Will connect the teams.  -Will obtain GI input if indicated.     Urinary retention  -on flomax  -look at TOV in the near future and  plan of care with [facility]  -facilitate active BM  -increase moblity     Bowel/Bladder  -facilitate daily active BM   -continence of bladder per timed void schedule and rehab nursing  -check PVRs and treat appropriately     DVT prophylaxis  -SCDs and ambulation  -Chemoprophylaxis on hold due to risk of GI bleed.     FEN  -follow BMP and treat appropriately  -monitor oral intake daily    9/14  -Ongoing through his rehab  -Monitor H&H.  Will recheck tomorrow to ensure H&H is staying stable.  -Blood sugars when it in acceptable ranges  -Blood pressure also well-controlled with heart rate bradycardic.       I spent 35 minutes in the professional and overall care of this patient.      Allison Díaz MD

## 2024-09-14 NOTE — CARE PLAN
The patient's goals for the shift include      The clinical goals for the shift include Maintain bladder scans of a<400    Problem: Skin  Goal: Decreased wound size/increased tissue granulation at next dressing change  Outcome: Progressing  Flowsheets (Taken 9/14/2024 0202)  Decreased wound size/increased tissue granulation at next dressing change:   Promote sleep for wound healing   Utilize specialty bed per algorithm   Protective dressings over bony prominences  Goal: Participates in plan/prevention/treatment measures  Outcome: Progressing  Flowsheets (Taken 9/14/2024 0202)  Participates in plan/prevention/treatment measures:   Discuss with provider PT/OT consult   Elevate heels   Increase activity/out of bed for meals  Goal: Promote/optimize nutrition  Outcome: Progressing  Flowsheets (Taken 9/14/2024 0202)  Promote/optimize nutrition:   Assist with feeding   Monitor/record intake including meals   Consume > 50% meals/supplements   Offer water/supplements/favorite foods   Discuss with provider if NPO > 2 days   Reassess MST if dietician not consulted  Goal: Promote skin healing  Outcome: Progressing  Flowsheets (Taken 9/14/2024 0202)  Promote skin healing:   Assess skin/pad under line(s)/device(s)   Protective dressings over bony prominences   Turn/reposition every 2 hours/use positioning/transfer devices     Problem: Fall/Injury  Goal: Use assistive devices by end of the shift  Outcome: Progressing  Goal: Pace activities to prevent fatigue by end of the shift  Outcome: Progressing     Problem: General Stroke  Goal: Establish a mutual long term goal with patient by discharge  Outcome: Progressing  Goal: Demonstrate improvement in neurological exam throughout the shift  Outcome: Progressing  Goal: Maintain BP within ordered limits throughout shift  Outcome: Progressing  Goal: Participate in treatment (ie., meds, therapy) throughout shift  Outcome: Progressing  Goal: Tolerate enteral feeding throughout  shift  Outcome: Progressing  Goal: Controlled blood glucose throughout shift  Outcome: Progressing  Goal: Out of bed three times today  Outcome: Progressing     Problem: ICU Stroke  Goal: Maintain ICP within ordered limits throughout shift  Outcome: Progressing  Goal: Tolerate EVD clamping trial throughout shift  Outcome: Progressing  Goal: Tolerate ventilator weaning trial during shift  Outcome: Progressing  Goal: Maintain patent airway throughout shift  Outcome: Progressing  Goal: Achieve/maintain targeted sodium level throughout shift  Outcome: Progressing

## 2024-09-14 NOTE — CARE PLAN
Problem: Skin  Goal: Decreased wound size/increased tissue granulation at next dressing change  9/14/2024 0203 by Maryanne Sage RN  Outcome: Progressing  Flowsheets (Taken 9/14/2024 0203)  Decreased wound size/increased tissue granulation at next dressing change:   Promote sleep for wound healing   Utilize specialty bed per algorithm   Protective dressings over bony prominences  9/14/2024 0202 by Maryanne Sage RN  Outcome: Progressing  Flowsheets (Taken 9/14/2024 0202)  Decreased wound size/increased tissue granulation at next dressing change:   Promote sleep for wound healing   Utilize specialty bed per algorithm   Protective dressings over bony prominences  Goal: Participates in plan/prevention/treatment measures  9/14/2024 0203 by Maryanne Sage RN  Outcome: Progressing  Flowsheets (Taken 9/14/2024 0202)  Participates in plan/prevention/treatment measures:   Discuss with provider PT/OT consult   Elevate heels   Increase activity/out of bed for meals  9/14/2024 0202 by Maryanne Sage RN  Outcome: Progressing  Flowsheets (Taken 9/14/2024 0202)  Participates in plan/prevention/treatment measures:   Discuss with provider PT/OT consult   Elevate heels   Increase activity/out of bed for meals  Goal: Promote/optimize nutrition  9/14/2024 0203 by Maryanne Sage RN  Outcome: Progressing  Flowsheets (Taken 9/14/2024 0203)  Promote/optimize nutrition:   Assist with feeding   Monitor/record intake including meals   Consume > 50% meals/supplements   Offer water/supplements/favorite foods   Discuss with provider if NPO > 2 days   Reassess MST if dietician not consulted  9/14/2024 0202 by Maryanne Sage RN  Outcome: Progressing  Flowsheets (Taken 9/14/2024 0202)  Promote/optimize nutrition:   Assist with feeding   Monitor/record intake including meals   Consume > 50% meals/supplements   Offer water/supplements/favorite foods   Discuss with provider if NPO > 2 days   Reassess MST if dietician not consulted  Goal: Promote  skin healing  9/14/2024 0203 by Maryanne Sage RN  Outcome: Progressing  Flowsheets (Taken 9/14/2024 0203)  Promote skin healing:   Assess skin/pad under line(s)/device(s)   Protective dressings over bony prominences   Turn/reposition every 2 hours/use positioning/transfer devices   Ensure correct size (line/device) and apply per  instructions  9/14/2024 0202 by Maryanne Sage RN  Outcome: Progressing  Flowsheets (Taken 9/14/2024 0202)  Promote skin healing:   Assess skin/pad under line(s)/device(s)   Protective dressings over bony prominences   Turn/reposition every 2 hours/use positioning/transfer devices     Problem: Fall/Injury  Goal: Use assistive devices by end of the shift  9/14/2024 0203 by Maryanne Sage RN  Outcome: Progressing  9/14/2024 0202 by Maryanne Sage RN  Outcome: Progressing  Goal: Pace activities to prevent fatigue by end of the shift  9/14/2024 0203 by Maryanne Sage RN  Outcome: Progressing  9/14/2024 0202 by Maryanne Sage RN  Outcome: Progressing     Problem: General Stroke  Goal: Establish a mutual long term goal with patient by discharge  9/14/2024 0203 by Maryanne Sage RN  Outcome: Progressing  9/14/2024 0202 by Maryanne Sage RN  Outcome: Progressing  Goal: Demonstrate improvement in neurological exam throughout the shift  9/14/2024 0203 by Maryanne Sage RN  Outcome: Progressing  9/14/2024 0202 by Maryanne Sage RN  Outcome: Progressing  Goal: Maintain BP within ordered limits throughout shift  9/14/2024 0203 by Maryanne Sage RN  Outcome: Progressing  9/14/2024 0202 by Maryanne Sage RN  Outcome: Progressing  Goal: Participate in treatment (ie., meds, therapy) throughout shift  9/14/2024 0203 by Maryanne Sage RN  Outcome: Progressing  9/14/2024 0202 by Maryanne Sage RN  Outcome: Progressing  Goal: Tolerate enteral feeding throughout shift  9/14/2024 0203 by Maryanne Sage RN  Outcome: Progressing  9/14/2024 0202 by Maryanne Sage RN  Outcome: Progressing  Goal:  Controlled blood glucose throughout shift  9/14/2024 0203 by Maryanne Sage RN  Outcome: Progressing  9/14/2024 0202 by Maryanne Sage RN  Outcome: Progressing  Goal: Out of bed three times today  9/14/2024 0203 by Maryanne Sage RN  Outcome: Progressing  9/14/2024 0202 by Maryanne Sage RN  Outcome: Progressing     Problem: ICU Stroke  Goal: Maintain ICP within ordered limits throughout shift  Outcome: Progressing  Goal: Tolerate EVD clamping trial throughout shift  Outcome: Progressing  Goal: Tolerate ventilator weaning trial during shift  Outcome: Progressing  Goal: Maintain patent airway throughout shift  Outcome: Progressing  Goal: Achieve/maintain targeted sodium level throughout shift  Outcome: Progressing   The patient's goals for the shift include      The clinical goals for the shift include Maintain bladder scans of a<400

## 2024-09-14 NOTE — PROGRESS NOTES
Physical Therapy    Physical Therapy Treatment    Patient Name: Regulo Marques  MRN: [id number]  Department: [facility] REHAB  Room: [address] [place] [address]  Today's Date: 9/14/2024  Time Calculation  Start Time: 0830  Stop Time: 0915  Time Calculation (min): 45 min         Assessment/Plan   PT Assessment  End of Session Patient Position: Up in chair, Alarm on     PT Plan  Treatment/Interventions: Bed mobility, Transfer training, Gait training, Stair training, Balance training, Strengthening, Endurance training, Therapeutic activity, Therapeutic exercise, Neuromuscular re-education  PT Plan: Ongoing PT  PT Frequency: 5 times per week (6x prn)  PT Discharge Recommendations:  (Anticipate discharge home with the need for mod assist at the walker level and will recommend first floor set-up.)  Equipment Recommended upon Discharge: Wheeled walker  PT Recommended Transfer Status: Assist x2      General Visit Information:      General  Patient Position Received: Up in chair, Alarm on  General Comment: Pt is [place], pleasant but impulsive, needing frequent repetition of commands    Subjective   Precautions:  Precautions  Medical Precautions: Fall precautions, Swallowing precautions  Precautions Comment: Impulsive, [place], severe L side inattention         Objective   Pain:  Pain Assessment  Pain Assessment: 0-10  0-10 (Numeric) Pain Score: 0 - No pain  Cognition:  Cognition  Overall Cognitive Status: Impaired  Impulsive: Severely    Treatments:  Therapeutic Exercise  Therapeutic Exercise Performed:  (Seated BLE AP, LAQ, Marches x 10 reps each for 2 sets.)    Therapeutic Activity  Therapeutic Activity Performed:  (Static stand at boubacar bar for 2 trials x 2-3 minutes each with Min A x 2 and cues for upright posture.)    Ambulation/Gait Training  Ambulation/Gait Training Performed: Yes  Ambulation/Gait Training 1  Comments/Distance (ft) 1: With Mod A x 1-2 using FWW, pt ambulated 100 ft x 3 in gym making L and R turns. Red TB not used so pt can work on  more active L hip abduction to prevent scissoring gait. Pt has severe L sided inattention, needing manual assist to guide walker especially with L turns in order to clear envionmental obstacles. Pt also required Mod/Max A with FWW to pivot to his L side from W/C to recliner with guidance to align with sitting surface.  Transfers  Transfer:  (Mod A for sit<>stand transfers at W/C to FWW with cues for proper hand and leg placement.)    EDUCATION: Edu pt on proper walker management and gait mechanics using FWW.       Encounter Problems       Encounter Problems (Active)       PT Problem       STG - Bed mobility with max assist x1.   (Progressing)       Start:  09/09/24    Expected End:  09/16/24            STG - Transfers with mod assist x2. (Progressing)       Start:  09/09/24    Expected End:  09/16/24            STG - Pt will amb 20 ft in a str line with approp device and mod assist x2.   (Progressing)       Start:  09/09/24    Expected End:  09/16/24            STG - Will determine most approp means of entry into home.   (Progressing)       Start:  09/09/24    Expected End:  09/16/24            LTG - Bed mobility with mod assist x1. (Progressing)       Start:  09/09/24    Expected End:  09/23/24            LTG - Transfers with mod assist x1. (Progressing)       Start:  09/09/24    Expected End:  09/23/24            LTG - Pt will amb 50 ft with approp device and mod assist x1. (Progressing)       Start:  09/09/24    Expected End:  09/23/24            LTG - Will educate pt and family on most approp means of entry into home.   (Progressing)       Start:  09/09/24    Expected End:  09/23/24

## 2024-09-14 NOTE — PROGRESS NOTES
Occupational Therapy    OT Treatment    Patient Name: Regulo Marques  MRN: [id number]  Department: [facility] REHAB  Room: [address] [place] [address]  Today's Date: 9/14/2024  Time Calculation  Start Time: 0750  Stop Time: 0820  Time Calculation (min): 30 min        Assessment:  End of Session Patient Position:  (w/c, alarm on, call light in reach)  OT Assessment Results:  (patient [place], with apraxia and left neglect; requires max cues for postural control, demo poor carryover overall despite continued cues/education)  Plan:  Treatment Interventions: ADL retraining, Endurance training, Patient/family training, Neuromuscular reeducation, Functional transfer training, Visual perceptual retraining, UE strengthening/ROM, Cognitive reorientation  OT Frequency:  (5 days/week, 90 mins/day, for 2 weeks)  OT Discharge Recommendations:  (anticipate need for up to mod assist with transfers, ADLs, and mobility)  Equipment Recommended upon Discharge: Wheeled walker, Bedside commode (tub bench, reacher)  Treatment Interventions: ADL retraining, Endurance training, Patient/family training, Neuromuscular reeducation, Functional transfer training, Visual perceptual retraining, UE strengthening/ROM, Cognitive reorientation    Subjective   Previous Visit Info:  OT Last Visit  OT Received On: 09/14/24  General:  General  Patient Position Received:  (w/c, alarm on)  General Comment: patient pleasant and cooperative, very [place]  Precautions:  Medical Precautions: Fall precautions, Swallowing precautions            Pain:  Pain Assessment  Pain Assessment:  (denies pain)    Objective    Cognition:  Cognition  Overall Cognitive Status: Impaired    Activities of Daily Living: Grooming  Grooming Level of Assistance: Close supervision  Grooming Where Assessed: Sitting sinkside  Grooming Comments: to comb hair, brush teeth, shave face with electric razor. extensive time for task completion    UE Bathing  UE Bathing Comments: patient declined despite encouragement    LE  Bathing  LE Bathing Comments: seated, patient bathing marge area with close supervision for safety; declined to bathe rest of LB this date    UE Dressing  UE Dressing Comments: patient declined despite encouragement    LE Dressing  LE Dressing: Yes  Pants Level of Assistance: Maximum assistance  Sock Level of Assistance: Maximum assistance  Adult Briefs Level of Assistance: Maximum assistance  LE Dressing Where Assessed:  (seated in w/c and then standing with bed rail when donning pants/brief over waist/hips)  LE Dressing Comments: per previous OT note, use of AE discontinued due to patient apraxia    Toileting  Toileting Comments: standing with unilateral support of bed rail, patient voiding into urinal with MIN-MOD A for steadying/balancing assist; patient did not have LB clothing on at this time, no clothing to manage during toileting this date       Bed Mobility/Transfers: Transfers  Transfer:  (patient completing STS x 2 trials with support of bed rail and MIN A x 1 with increased time during LB dressing/toileting this date)           Education Documentation  ADL Training, taught by Loly Anderson OT at 9/14/2024 11:54 AM.  Learner: Patient  Readiness: Acceptance  Method: Explanation  Response: Verbalizes Understanding, Needs Reinforcement    Education Comments  No comments found.        OP EDUCATION:       Goals:  Encounter Problems       Encounter Problems (Active)       OT Problem       LTG - Patient will complete eating with set up. (Progressing)       Start:  09/09/24    Expected End:  09/23/24            LTG - Patient will complete grooming with sba. (Progressing)       Start:  09/09/24    Expected End:  09/23/24            LTG - Patient will complete toileting with mod assist. (Progressing)       Start:  09/09/24    Expected End:  09/23/24            LTG - Patient will complete bathing with min/mod assist. (Progressing)       Start:  09/09/24    Expected End:  09/23/24            LTG - Patient will  complete UE dressing with sba. (Progressing)       Start:  09/09/24    Expected End:  09/23/24            LTG - Patient will complete LE dressing using adaptive equip as needed with mod assist. (Progressing)       Start:  09/09/24    Expected End:  09/23/24            LTG - Patient will complete commode transfer via pivot or device as needed with mod assist. (Progressing)       Start:  09/09/24    Expected End:  09/23/24            LTG - Patient will complete tub/shower transfer using DME as needed with mod/max assist. (Progressing)       Start:  09/09/24    Expected End:  09/23/24            LTG - Patient will complete simple mobility using device as needed with mod assist. (Progressing)       Start:  09/09/24    Expected End:  09/23/24            LTG - Patient will complete wheelchair mobility with sba. (Progressing)       Start:  09/09/24    Expected End:  09/23/24            STG - Patient will complete grooming with min assist. (Progressing)       Start:  09/09/24    Expected End:  09/18/24            STG - Patient will complete toileting with mod assist x2. (Progressing)       Start:  09/09/24    Expected End:  09/18/24            STG - Patient will complete sponge bathing with mod/max assist. (Progressing)       Start:  09/09/24    Expected End:  09/18/24            STG - Patient will complete UE dressing with mod assist. (Met)       Start:  09/09/24    Expected End:  09/16/24    Resolved:  09/11/24    Updated to: STG - Patient will complete UE dressing with min assist.    Update reason: STG met         STG - Patient will complete LE dressing using adaptive equip as needed with mod assist x2. (Met)       Start:  09/09/24    Expected End:  09/16/24    Resolved:  09/11/24    Updated to: STG - Patient will complete LE dressing using adaptive equip as needed with min assist x2.    Update reason: STG met         STG - Patient will complete commode transfer via device as needed with min assist x2. (Progressing)        Start:  09/09/24    Expected End:  09/18/24            STG - Patient will complete simple wheelchair mobility with min assist. (Progressing)       Start:  09/09/24    Expected End:  09/18/24            STG - Patient will complete UE dressing with min assist. (Progressing)       Start:  09/11/24    Expected End:  09/18/24                STG - Patient will complete LE dressing using adaptive equip as needed with min assist x2. (Progressing)       Start:  09/11/24    Expected End:  09/18/24

## 2024-09-15 VITALS
BODY MASS INDEX: 30.37 KG/M2 | HEART RATE: 53 BPM | SYSTOLIC BLOOD PRESSURE: 139 MMHG | DIASTOLIC BLOOD PRESSURE: 63 MMHG | TEMPERATURE: 97.9 F | RESPIRATION RATE: 18 BRPM | WEIGHT: 205.03 LBS | OXYGEN SATURATION: 100 % | HEIGHT: 69 IN

## 2024-09-15 LAB
ANION GAP SERPL CALC-SCNC: 12 MMOL/L (ref 10–20)
BASOPHILS # BLD AUTO: 0.03 X10*3/UL (ref 0–0.1)
BASOPHILS NFR BLD AUTO: 0.4 %
BUN SERPL-MCNC: 48 MG/DL (ref 6–23)
CALCIUM SERPL-MCNC: 9 MG/DL (ref 8.6–10.3)
CHLORIDE SERPL-SCNC: 109 MMOL/L (ref 98–107)
CO2 SERPL-SCNC: 22 MMOL/L (ref 21–32)
CREAT SERPL-MCNC: 1.66 MG/DL (ref 0.5–1.3)
EGFRCR SERPLBLD CKD-EPI 2021: 40 ML/MIN/1.73M*2
EOSINOPHIL # BLD AUTO: 0.21 X10*3/UL (ref 0–0.4)
EOSINOPHIL NFR BLD AUTO: 2.8 %
ERYTHROCYTE [DISTWIDTH] IN BLOOD BY AUTOMATED COUNT: 15.5 % (ref 11.5–14.5)
GLUCOSE BLD MANUAL STRIP-MCNC: 121 MG/DL (ref 74–99)
GLUCOSE BLD MANUAL STRIP-MCNC: 147 MG/DL (ref 74–99)
GLUCOSE BLD MANUAL STRIP-MCNC: 166 MG/DL (ref 74–99)
GLUCOSE SERPL-MCNC: 126 MG/DL (ref 74–99)
HCT VFR BLD AUTO: 22.5 % (ref 41–52)
HGB BLD-MCNC: 6.6 G/DL (ref 13.5–17.5)
HOLD SPECIMEN: NORMAL
IMM GRANULOCYTES # BLD AUTO: 0.03 X10*3/UL (ref 0–0.5)
IMM GRANULOCYTES NFR BLD AUTO: 0.4 % (ref 0–0.9)
LYMPHOCYTES # BLD AUTO: 1.15 X10*3/UL (ref 0.8–3)
LYMPHOCYTES NFR BLD AUTO: 15.1 %
MCH RBC QN AUTO: 29.1 PG (ref 26–34)
MCHC RBC AUTO-ENTMCNC: 29.3 G/DL (ref 32–36)
MCV RBC AUTO: 99 FL (ref 80–100)
MONOCYTES # BLD AUTO: 0.58 X10*3/UL (ref 0.05–0.8)
MONOCYTES NFR BLD AUTO: 7.6 %
NEUTROPHILS # BLD AUTO: 5.6 X10*3/UL (ref 1.6–5.5)
NEUTROPHILS NFR BLD AUTO: 73.7 %
NRBC BLD-RTO: 0 /100 WBCS (ref 0–0)
PLATELET # BLD AUTO: 203 X10*3/UL (ref 150–450)
POTASSIUM SERPL-SCNC: 4.6 MMOL/L (ref 3.5–5.3)
RBC # BLD AUTO: 2.27 X10*6/UL (ref 4.5–5.9)
SODIUM SERPL-SCNC: 138 MMOL/L (ref 136–145)
WBC # BLD AUTO: 7.6 X10*3/UL (ref 4.4–11.3)

## 2024-09-15 PROCEDURE — 36415 COLL VENOUS BLD VENIPUNCTURE: CPT | Performed by: PHYSICAL MEDICINE & REHABILITATION

## 2024-09-15 PROCEDURE — 2500000002 HC RX 250 W HCPCS SELF ADMINISTERED DRUGS (ALT 637 FOR MEDICARE OP, ALT 636 FOR OP/ED): Performed by: PHYSICAL MEDICINE & REHABILITATION

## 2024-09-15 PROCEDURE — 80048 BASIC METABOLIC PNL TOTAL CA: CPT | Performed by: PHYSICAL MEDICINE & REHABILITATION

## 2024-09-15 PROCEDURE — 2500000001 HC RX 250 WO HCPCS SELF ADMINISTERED DRUGS (ALT 637 FOR MEDICARE OP): Performed by: PHYSICAL MEDICINE & REHABILITATION

## 2024-09-15 PROCEDURE — 85025 COMPLETE CBC W/AUTO DIFF WBC: CPT | Performed by: PHYSICAL MEDICINE & REHABILITATION

## 2024-09-15 PROCEDURE — 82947 ASSAY GLUCOSE BLOOD QUANT: CPT

## 2024-09-15 PROCEDURE — 1180000001 HC REHAB PRIVATE ROOM DAILY

## 2024-09-15 RX ORDER — SYRING-NEEDL,DISP,INSUL,0.3 ML 29 G X1/2"
296 SYRINGE, EMPTY DISPOSABLE MISCELLANEOUS ONCE
Status: DISCONTINUED | OUTPATIENT
Start: 2024-09-15 | End: 2024-09-15

## 2024-09-15 ASSESSMENT — PAIN - FUNCTIONAL ASSESSMENT
PAIN_FUNCTIONAL_ASSESSMENT: 0-10
PAIN_FUNCTIONAL_ASSESSMENT: 0-10

## 2024-09-15 ASSESSMENT — PAIN SCALES - GENERAL
PAINLEVEL_OUTOF10: 0 - NO PAIN
PAINLEVEL_OUTOF10: 0 - NO PAIN

## 2024-09-15 ASSESSMENT — PAIN SCALES - WONG BAKER: WONGBAKER_NUMERICALRESPONSE: NO HURT

## 2024-09-15 NOTE — CARE PLAN
The patient's goals for the shift include      The clinical goals for the shift include Remain hemodynamically stable, get quality rest for PT/OT      Problem: Skin  Goal: Decreased wound size/increased tissue granulation at next dressing change  Outcome: Progressing  Flowsheets (Taken 9/15/2024 0017)  Decreased wound size/increased tissue granulation at next dressing change: Promote sleep for wound healing  Goal: Participates in plan/prevention/treatment measures  Outcome: Progressing  Flowsheets (Taken 9/15/2024 0017)  Participates in plan/prevention/treatment measures: Elevate heels  Goal: Promote/optimize nutrition  Outcome: Progressing  Flowsheets (Taken 9/15/2024 0017)  Promote/optimize nutrition: Monitor/record intake including meals  Goal: Promote skin healing  Outcome: Progressing  Flowsheets (Taken 9/15/2024 0017)  Promote skin healing: Assess skin/pad under line(s)/device(s)     Problem: Fall/Injury  Goal: Use assistive devices by end of the shift  Outcome: Progressing  Goal: Pace activities to prevent fatigue by end of the shift  Outcome: Progressing     Problem: General Stroke  Goal: Establish a mutual long term goal with patient by discharge  Outcome: Progressing  Goal: Demonstrate improvement in neurological exam throughout the shift  Outcome: Progressing  Goal: Maintain BP within ordered limits throughout shift  Outcome: Progressing  Goal: Participate in treatment (ie., meds, therapy) throughout shift  Outcome: Progressing  Goal: Tolerate enteral feeding throughout shift  Outcome: Progressing  Goal: Controlled blood glucose throughout shift  Outcome: Progressing  Goal: Out of bed three times today  Outcome: Progressing     Problem: ICU Stroke  Goal: Maintain ICP within ordered limits throughout shift  Outcome: Progressing  Goal: Tolerate EVD clamping trial throughout shift  Outcome: Progressing  Goal: Tolerate ventilator weaning trial during shift  Outcome: Progressing  Goal: Maintain patent airway  throughout shift  Outcome: Progressing  Goal: Achieve/maintain targeted sodium level throughout shift  Outcome: Progressing

## 2024-09-15 NOTE — CARE PLAN
The patient's goals for the shift include  maintain safety.     The clinical goals for the shift include remainhemodynamically stable.    Over the shift, the patient did not make progress toward the following goals. Barriers to progression include weakness. Recommendations to address these barriers include therapy services.

## 2024-09-15 NOTE — CARE PLAN
The patient's goals for the shift include  maintain safety.     The clinical goals for the shift include remain hemodynamically stable.     Over the shift, the patient did not make progress toward the following goals. Barriers to progression include weakness. Recommendations to address these barriers include two person assist.

## 2024-09-16 ENCOUNTER — EXTERNAL HOSPITAL ADMISSION (OUTPATIENT)
Dept: ADMISSION | Facility: HOSPITAL | Age: 84
End: 2024-09-16
Payer: MEDICARE

## 2024-09-16 ENCOUNTER — HOSPITAL ENCOUNTER (INPATIENT)
Facility: HOSPITAL | Age: 84
Discharge: HOME | End: 2024-09-16
Attending: INTERNAL MEDICINE | Admitting: INTERNAL MEDICINE
Payer: MEDICARE

## 2024-09-16 VITALS
BODY MASS INDEX: 30.37 KG/M2 | RESPIRATION RATE: 18 BRPM | WEIGHT: 205.03 LBS | DIASTOLIC BLOOD PRESSURE: 56 MMHG | SYSTOLIC BLOOD PRESSURE: 116 MMHG | HEART RATE: 49 BPM | HEIGHT: 69 IN | OXYGEN SATURATION: 100 % | TEMPERATURE: 97.7 F

## 2024-09-16 DIAGNOSIS — D64.9 ANEMIA, UNSPECIFIED TYPE: Primary | ICD-10-CM

## 2024-09-16 LAB
ABO GROUP (TYPE) IN BLOOD: NORMAL
ABO GROUP (TYPE) IN BLOOD: NORMAL
ANTIBODY SCREEN: NORMAL
ERYTHROCYTE [DISTWIDTH] IN BLOOD BY AUTOMATED COUNT: 15.6 % (ref 11.5–14.5)
GLUCOSE BLD MANUAL STRIP-MCNC: 129 MG/DL (ref 74–99)
GLUCOSE BLD MANUAL STRIP-MCNC: 140 MG/DL (ref 74–99)
GLUCOSE BLD MANUAL STRIP-MCNC: 188 MG/DL (ref 74–99)
GLUCOSE BLD MANUAL STRIP-MCNC: 204 MG/DL (ref 74–99)
HCT VFR BLD AUTO: 21.6 % (ref 41–52)
HGB BLD-MCNC: 6.7 G/DL (ref 13.5–17.5)
HOLD SPECIMEN: NORMAL
MCH RBC QN AUTO: 31 PG (ref 26–34)
MCHC RBC AUTO-ENTMCNC: 31 G/DL (ref 32–36)
MCV RBC AUTO: 100 FL (ref 80–100)
NRBC BLD-RTO: 0 /100 WBCS (ref 0–0)
PLATELET # BLD AUTO: 198 X10*3/UL (ref 150–450)
RBC # BLD AUTO: 2.16 X10*6/UL (ref 4.5–5.9)
RH FACTOR (ANTIGEN D): NORMAL
RH FACTOR (ANTIGEN D): NORMAL
WBC # BLD AUTO: 7.4 X10*3/UL (ref 4.4–11.3)

## 2024-09-16 PROCEDURE — 86920 COMPATIBILITY TEST SPIN: CPT

## 2024-09-16 PROCEDURE — P9016 RBC LEUKOCYTES REDUCED: HCPCS

## 2024-09-16 PROCEDURE — 99239 HOSP IP/OBS DSCHRG MGMT >30: CPT | Performed by: PHYSICAL MEDICINE & REHABILITATION

## 2024-09-16 PROCEDURE — 82947 ASSAY GLUCOSE BLOOD QUANT: CPT

## 2024-09-16 PROCEDURE — 86901 BLOOD TYPING SEROLOGIC RH(D): CPT | Performed by: PHYSICAL MEDICINE & REHABILITATION

## 2024-09-16 PROCEDURE — 2500000001 HC RX 250 WO HCPCS SELF ADMINISTERED DRUGS (ALT 637 FOR MEDICARE OP): Performed by: PHYSICAL MEDICINE & REHABILITATION

## 2024-09-16 PROCEDURE — 36415 COLL VENOUS BLD VENIPUNCTURE: CPT | Performed by: PHYSICAL MEDICINE & REHABILITATION

## 2024-09-16 PROCEDURE — 97112 NEUROMUSCULAR REEDUCATION: CPT | Mod: GO,CO

## 2024-09-16 PROCEDURE — 99223 1ST HOSP IP/OBS HIGH 75: CPT | Performed by: STUDENT IN AN ORGANIZED HEALTH CARE EDUCATION/TRAINING PROGRAM

## 2024-09-16 PROCEDURE — 92526 ORAL FUNCTION THERAPY: CPT | Mod: GN | Performed by: IN HOME SUPPORTIVE CARE

## 2024-09-16 PROCEDURE — 97110 THERAPEUTIC EXERCISES: CPT | Mod: GP,CQ

## 2024-09-16 PROCEDURE — 36430 TRANSFUSION BLD/BLD COMPNT: CPT

## 2024-09-16 PROCEDURE — 97530 THERAPEUTIC ACTIVITIES: CPT | Mod: GP,CQ

## 2024-09-16 PROCEDURE — 97129 THER IVNTJ 1ST 15 MIN: CPT | Mod: GN | Performed by: IN HOME SUPPORTIVE CARE

## 2024-09-16 PROCEDURE — 1100000001 HC PRIVATE ROOM DAILY

## 2024-09-16 PROCEDURE — 2500000002 HC RX 250 W HCPCS SELF ADMINISTERED DRUGS (ALT 637 FOR MEDICARE OP, ALT 636 FOR OP/ED): Performed by: PHYSICAL MEDICINE & REHABILITATION

## 2024-09-16 PROCEDURE — 92507 TX SP LANG VOICE COMM INDIV: CPT | Mod: GN | Performed by: IN HOME SUPPORTIVE CARE

## 2024-09-16 PROCEDURE — 97530 THERAPEUTIC ACTIVITIES: CPT | Mod: GO,CO

## 2024-09-16 PROCEDURE — 85027 COMPLETE CBC AUTOMATED: CPT | Performed by: PHYSICAL MEDICINE & REHABILITATION

## 2024-09-16 PROCEDURE — 97116 GAIT TRAINING THERAPY: CPT | Mod: GP,CQ

## 2024-09-16 RX ORDER — SENNOSIDES 8.6 MG/1
1 TABLET ORAL NIGHTLY
Status: ON HOLD
Start: 2024-09-16 | End: 2024-10-16

## 2024-09-16 RX ORDER — TAMSULOSIN HYDROCHLORIDE 0.4 MG/1
0.8 CAPSULE ORAL NIGHTLY
Status: ON HOLD
Start: 2024-09-16

## 2024-09-16 RX ORDER — INSULIN LISPRO 100 [IU]/ML
0-5 INJECTION, SOLUTION INTRAVENOUS; SUBCUTANEOUS
Status: ON HOLD
Start: 2024-09-16

## 2024-09-16 RX ORDER — ACETAMINOPHEN 325 MG/1
650 TABLET ORAL EVERY 6 HOURS PRN
Qty: 30 TABLET | Refills: 0 | Status: ON HOLD | OUTPATIENT
Start: 2024-09-16

## 2024-09-16 RX ORDER — BISACODYL 5 MG
10 TABLET, DELAYED RELEASE (ENTERIC COATED) ORAL DAILY PRN
Status: ON HOLD
Start: 2024-09-16

## 2024-09-16 RX ORDER — BISACODYL 10 MG/1
10 SUPPOSITORY RECTAL DAILY PRN
Status: ON HOLD
Start: 2024-09-16

## 2024-09-16 RX ORDER — ACETAMINOPHEN 160 MG/5ML
650 SOLUTION ORAL EVERY 4 HOURS PRN
Status: ON HOLD
Start: 2024-09-16

## 2024-09-16 ASSESSMENT — PAIN SCALES - GENERAL
PAINLEVEL_OUTOF10: 0 - NO PAIN

## 2024-09-16 ASSESSMENT — PAIN - FUNCTIONAL ASSESSMENT
PAIN_FUNCTIONAL_ASSESSMENT: 0-10

## 2024-09-16 NOTE — CARE PLAN
The patient's goals for the shift include      The clinical goals for the shift include Stay hemodynamically stable. Get some quality rest for PT/OT in AM.      Problem: Skin  Goal: Decreased wound size/increased tissue granulation at next dressing change  Outcome: Progressing  Flowsheets (Taken 9/15/2024 2313)  Decreased wound size/increased tissue granulation at next dressing change: Promote sleep for wound healing  Goal: Participates in plan/prevention/treatment measures  Outcome: Progressing  Flowsheets (Taken 9/15/2024 2313)  Participates in plan/prevention/treatment measures: Elevate heels  Goal: Promote/optimize nutrition  Outcome: Progressing  Flowsheets (Taken 9/15/2024 2313)  Promote/optimize nutrition: Monitor/record intake including meals  Goal: Promote skin healing  Outcome: Progressing  Flowsheets (Taken 9/15/2024 2313)  Promote skin healing:   Assess skin/pad under line(s)/device(s)   Protective dressings over bony prominences     Problem: Fall/Injury  Goal: Use assistive devices by end of the shift  Outcome: Progressing  Goal: Pace activities to prevent fatigue by end of the shift  Outcome: Progressing     Problem: General Stroke  Goal: Establish a mutual long term goal with patient by discharge  Outcome: Progressing  Goal: Demonstrate improvement in neurological exam throughout the shift  Outcome: Progressing  Goal: Maintain BP within ordered limits throughout shift  Outcome: Progressing  Goal: Participate in treatment (ie., meds, therapy) throughout shift  Outcome: Progressing  Goal: Tolerate enteral feeding throughout shift  Outcome: Progressing  Goal: Controlled blood glucose throughout shift  Outcome: Progressing  Goal: Out of bed three times today  Outcome: Progressing     Problem: ICU Stroke  Goal: Maintain ICP within ordered limits throughout shift  Outcome: Progressing  Goal: Tolerate EVD clamping trial throughout shift  Outcome: Progressing  Goal: Tolerate ventilator weaning trial during  shift  Outcome: Progressing  Goal: Maintain patent airway throughout shift  Outcome: Progressing  Goal: Achieve/maintain targeted sodium level throughout shift  Outcome: Progressing

## 2024-09-16 NOTE — PROGRESS NOTES
Appreciate the update and consult from GI.     Patient will undergo a colonoscopy for further evaluation of potential bleeding. He will need an NG for the prep. Due to the patient's complex medical history and increased risks he will be transferred to the acute care team, Dr. Casarez.    The patient and wife are aware.  I did speak to the patient, the wife, the daughter, and the granddaughter this evening.  Patient will be transferred to the acute medical floor for further GI workup and evaluation.  We also talked about future plans for rehab.  He will likely need to go to a skilled facility after acute rehab due to his need for ongoing therapies.  This was communicated to the family.    Patient was stable at the time of discharge.  He was mod assist of 2 for functional mobility.

## 2024-09-16 NOTE — CARE PLAN
The patient's goals for the shift include  successful therapy    The clinical goals for the shift include monitor for bleeding

## 2024-09-16 NOTE — PROGRESS NOTES
Speech-Language Pathology                 Therapy Communication Note    Patient Name: Regulo Marques  MRN: [id number]  Department: [facility] REHAB  Room: [address] [place], [place] [address]  Today's Date: 9/16/2024     Discipline: Speech Language Pathology    Comment:  Wife and granddaughter in room while dropping off [employer] information and updating communication board.  Wife educated on exercise program for pharyngeal strengthening; copies provided and equipment.

## 2024-09-16 NOTE — PROGRESS NOTES
"  Regulo Marques is a 83 y.o. male on day 8 of admission presenting with 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.    Subjective   Aware of drop in H/H with starting eliquis.     Patient without complaints. He is agreeable to transfuse 1 unit of PRBC.       Objective       Physical Exam  Pleasant male in NAD  CTAB  SNTND+BS  Negative Efrem's bilaterally  Left hemiparesis with neglect    Function: mod/max A x 2     Assessment/Plan        Last Recorded Vitals  Blood pressure 131/62, pulse 57, temperature 36.6 °C (97.9 °F), temperature source Temporal, resp. rate 16, height 1.753 m (5' 9.02\"), weight 93 kg (205 lb 0.4 oz), SpO2 99%.    Therapy notes from last 24 hours reviewed.    Relevant Results                  Scheduled medications  atorvastatin, 80 mg, oral, Nightly  insulin lispro, 0-5 Units, subcutaneous, TID  sennosides, 1 tablet, oral, Nightly  tamsulosin, 0.8 mg, oral, Nightly      Continuous medications     PRN medications  PRN medications: acetaminophen **OR** acetaminophen, alum-mag hydroxide-simeth, bisacodyl, bisacodyl, melatonin     Results for orders placed or performed during the hospital encounter of 09/08/24 (from the past 24 hour(s))   POCT GLUCOSE   Result Value Ref Range    POCT Glucose 166 (H) 74 - 99 mg/dL   POCT GLUCOSE   Result Value Ref Range    POCT Glucose 147 (H) 74 - 99 mg/dL   POCT GLUCOSE   Result Value Ref Range    POCT Glucose 129 (H) 74 - 99 mg/dL                 Assessment/Plan   Principal Problem:    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  Active Problems:    Anemia    Chronic diastolic heart failure (Multi)    Diabetes mellitus (Multi)    Essential hypertension, benign    History of malignant neoplasm of colon    Longstanding persistent atrial fibrillation (Multi)    Obesity (BMI 30-39.9)    CVA (cerebrovascular accident due to intracerebral hemorrhage) (Multi)    Atrial fibrillation (Multi)    Urinary " retention            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  -Start speech therapy for swallowing cognition  -Will discuss with family regards stroke prophylaxis and starting of anticoagulation due to recent GI bleed.  -Will follow-up with GI.  -Continue statin  -Continue blood pressure management, add medications as appropriate  Estimated length of stay is 20 days discharge with intermittent assist.  CVA (cerebrovascular accident due to intracerebral hemorrhage) (Multi)  -Continue plan of care as noted above     Anemia  -History of GI bleed.  -Will continue to follow H&H and treat as indicated  -Await starting anticoagulation until stable     Chronic diastolic heart failure (Multi)  -Monitor current cardiac function.  Currently on no medications for his cardiac dysfunction     Diabetes mellitus (Multi)  -Monitor blood sugars and adjust medications as indicated.     Essential hypertension, benign  -Monitor blood pressure and treat as indicated     History of malignant neoplasm of colon  -History of GI bleed.  Will follow-up with GI.  -Questionable pending anticoagulation     Longstanding persistent atrial fibrillation (Multi)  -Monitor heart rate and adjust medications as appropriate  -Pending decision for anticoagulation due to risk of GI bleed     Obesity (BMI 30-39.9)  -Monitor diet and continue supportive nutrition.  Atrial fibrillation (Multi)  -Hold anticoagulation at this time until clarification from family.  -Neurology was to start Eliquis.  Will connect the teams.  -Will obtain GI input if indicated.     Urinary retention  -on flomax  -look at TOV in the near future and plan of care with [facility]  -facilitate active BM  -increase moblity     Bowel/Bladder  -facilitate daily active BM   -continence of bladder per timed void schedule and rehab nursing  -check PVRs and treat appropriately     DVT  prophylaxis  -SCDs and ambulation  -Chemoprophylaxis on hold due to risk of GI bleed.     FEN  -follow BMP and treat appropriately  -monitor oral intake daily     9/16  -drop in H/H  -stop eliquis and discuss with paient and family  -Type and screen and transfuse 1 unit of PRBC  -Vitals stable  -ongoing rehab  -follow h/h  -consent obtained       I spent 35 minutes in the professional and overall care of this patient.      Allison Díaz MD

## 2024-09-16 NOTE — CONSULTS
Inpatient consult to Gastroenterology  Consult performed by: Gurwinder Yeung DO  Consult ordered by: Allison Díaz MD  Reason for consult: GI bleed once started on Eliquis      Reason For Consult  GI bleed once started on Eliquis    History Of Present Illness  Regulo Marques is a 83 y.o. male presenting with strokelike symptoms.    India Marques is a 83 year old Male with a past medical history of A-fib, colon cancer s/p colectomy, multiple small bowel obstructions, prostate cancer, unresectable rectal cancer s/p chemo and radiation therapy, diverticulitis, diabetes, hypertension, gout who presents to [facility] ED with strokelike symptoms.  Of note, he was admitted to the [facility] ED 8/12/2024 for with an upper GI bleed with hemoglobin of 6.1 requiring EGD and multiple transfusions.  Because of this he was taken off his Eliquis.  Follows with Dr. ALEXUS MARINELLI for outpatient GI.  GI team was already consulted earlier this admission on 9/3/2024, and decision was made by GI team and neuro consult to restart Eliquis 5 to 7 days after stroke was confirmed.  GI team reconsulted after downtrending H/H on Eliquis.     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  Last colonoscopy 2014 shows Transverse colon polyp with tubular adenoma  Descending colon polyps with colonic mucosa with lymphoid aggregate and hyperplastic changes       Past Medical History  He has a past medical history of Diverticulitis of intestine, part unspecified, without perforation or abscess without bleeding, Personal history of malignant neoplasm, unspecified, Personal history of other diseases of the circulatory system, Personal history of other diseases of the digestive system, Personal history of other endocrine, nutritional and metabolic disease, Personal history of other malignant neoplasm of large intestine, and Personal history of other specified conditions.    Surgical History  He  has a past surgical history that includes Colonoscopy (10/20/2016); Total hip arthroplasty (10/20/2016); Tonsillectomy (10/20/2016); Other surgical history (11/08/2019); and Other surgical history (11/08/2019).     Social History  He reports that he has quit smoking. His smoking use included cigarettes. He has never used smokeless tobacco. He reports that he does not drink alcohol and does not use drugs.    Family History  No family history on file.     Allergies  Codeine    Review of Systems  See HPI     Physical Exam  CONSTITUTIONAL - well nourished, well developed, looks like stated age, in no acute distress, not ill-appearing, and not tired appearing  SKIN - normal skin color and pigmentation, normal skin turgor without rash, lesions, or nodules visualized  HEAD - no trauma, normocephalic  EYES - pupils are equal and reactive to light, extraocular muscles are intact, and normal external exam  ENT - TM's intact, no injection, no signs of infection, uvula midline, normal tongue movement and throat normal, no exudate, nasal passage without discharge and patent  NECK - supple without rigidity, no neck mass was observed, no thyromegaly or thyroid nodules  CHEST - clear to auscultation, no wheezing, no crackles and no rales, good effort  CARDIAC - regular rate and regular rhythm, no skipped beats, no murmur  ABDOMEN - no organomegaly, soft, nontender, nondistended, normal bowel sounds, no guarding/rebound/rigidity, negative McBurney sign and negative Peterson sign  EXTREMITIES - no edema, no deformities  NEUROLOGICAL - normal gait, normal balance, normal motor, no ataxia, DTRs equal and symmetrical; alert, oriented and no focal signs  PSYCHIATRIC - alert, pleasant and cordial, age-appropriate  IMMUNOLOGIC - no cervical lymphadenopathy        Last Recorded Vitals  /62 (BP Location: Right arm, Patient Position: Lying)   Pulse 57   Temp 36.6 °C (97.9 °F) (Temporal)   Resp 16   Wt 93 kg (205 lb 0.4 oz)   SpO2 99%         Assessment/Plan   Regulo Marques is a 83 year old Male with a past medical history of A-fib, colon cancer s/p colectomy, multiple small bowel obstructions, prostate cancer, unresectable rectal cancer s/p chemo and radiation therapy, diverticulitis, diabetes, hypertension, gout who presents to [facility] ED 9/2/2024 with strokelike symptoms.  Of note, he was admitted to the [facility] ED 8/12/2024 for with an upper GI bleed with hemoglobin of 6.1 requiring EGD and multiple transfusions.  Because of this he was taken off his Eliquis.  GI team was already consulted earlier this admission on 9/3/2024, and decision was made by GI team and neuro consult to restart Eliquis 5 to 7 days after stroke was confirmed.  GI team was reconsulted 9/16 due to patient's downtrending H/H after restarting Eliquis.     Acute CVA  Anemia, suspected recent upper GI bleed 8/12/2024  Hankins's esophagus  Gastritis with no focal ulcers  -Patient's Eliquis was restarted 9/12 and discontinued 9/16 due to downtrending H/H  -No signs or subjective symptoms of overt bleeding, suspect multifactorial contributions to anemia  -Patient ate today, currently on thickened liquid diet per SLP evaluation, will proceed with bowel prep through NG tube 9/17 and via joint decision making, plan for colonoscopy 9/18     Dr. Gurwinder Yeung   Internal Medicine PGY-1  Available on [email or web address] for any questions.     This is a preliminary note pending signature from the attending physician.

## 2024-09-16 NOTE — PROGRESS NOTES
Physical Therapy    Physical Therapy Treatment    Patient Name: Regulo Marques  MRN: [id number]  Department: [facility] REHAB  Room: [address] [place], [place] [address]  Today's Date: 9/16/2024  Time Calculation  Start Time: 1045  Stop Time: 1130  Time Calculation (min): 45 min         Assessment/Plan         PT Plan  Treatment/Interventions: Bed mobility, Transfer training, Gait training, Stair training, Balance training, Strengthening, Endurance training, Therapeutic activity, Therapeutic exercise, Neuromuscular re-education  PT Plan: Ongoing PT  PT Frequency: 5 times per week (6x prn)  PT Discharge Recommendations:  (Anticipate discharge home with the need for mod assist at the walker level and will recommend first floor set-up.)  Equipment Recommended upon Discharge: Wheeled walker  PT Recommended Transfer Status: Assist x2      General Visit Information:   PT  Visit  PT Received On: 09/16/24  General  Prior to Session Communication: Bedside nurse  Patient Position Received: Up in chair, Alarm on    Subjective   Precautions:  Precautions  Hearing/Visual Limitations: [place]  Medical Precautions: Fall precautions, Swallowing precautions  Precautions Comment: Impulsive, [place], severe L side inattention            Objective   Pain:  Pain Assessment  0-10 (Numeric) Pain Score: 0 - No pain  Cognition:  Cognition  Safety/Judgement: Exceptions to WFL  Impulsive: Moderately  Coordination:  Coordination Comment: ataxia, apraxia left side    Treatments:  Therapeutic Exercise  Therapeutic Exercise Performed:  (NuStep x8 minutes; left hand and LE strapping needed.)    Ambulation/Gait Training  Ambulation/Gait Training Performed:  (gait training 100' with ww, left hand strap, and mod assist of 1 with w/c follow. Cues needed to prevent LLE ADD. Poor left side environmental awareness.)    Transfers  Transfer:  (transfers sit/stand with mod assist of 1, stand pivot to right with mod assist of 1.)      Education Documentation  Patient educated in safe techniques for  gait with a device in order to maximize safety and functional independence.    Patient educated in safe and proper transfer techniques including proper positioning and hand/foot placement to maximize safety and functional independence.         Encounter Problems       Encounter Problems (Active)       PT Problem       STG - Bed mobility with max assist x1.   (Progressing)       Start:  09/09/24    Expected End:  09/16/24            STG - Transfers with mod assist x2. (Progressing)       Start:  09/09/24    Expected End:  09/16/24            STG - Pt will amb 20 ft in a str line with approp device and mod assist x2.   (Progressing)       Start:  09/09/24    Expected End:  09/16/24            STG - Will determine most approp means of entry into home.   (Progressing)       Start:  09/09/24    Expected End:  09/16/24            LTG - Bed mobility with mod assist x1. (Progressing)       Start:  09/09/24    Expected End:  09/23/24            LTG - Transfers with mod assist x1. (Progressing)       Start:  09/09/24    Expected End:  09/23/24            LTG - Pt will amb 50 ft with approp device and mod assist x1. (Progressing)       Start:  09/09/24    Expected End:  09/23/24            LTG - Will educate pt and family on most approp means of entry into home.   (Progressing)       Start:  09/09/24    Expected End:  09/23/24

## 2024-09-16 NOTE — CARE PLAN
The patient's goals for the shift include      The clinical goals for the shift include monitor for bleeding

## 2024-09-16 NOTE — PROGRESS NOTES
Physical Therapy    Physical Therapy Treatment    Patient Name: Regulo Marques  MRN: [id number]  Department: [facility] REHAB  Room: [address] [place], [place] [address]  Today's Date: 9/16/2024  Time Calculation  Start Time: 0830  Stop Time: 0915  Time Calculation (min): 45 min         Assessment/Plan         PT Plan  Treatment/Interventions: Bed mobility, Transfer training, Gait training, Stair training, Balance training, Strengthening, Endurance training, Therapeutic activity, Therapeutic exercise, Neuromuscular re-education  PT Plan: Ongoing PT  PT Frequency: 5 times per week (6x prn)  PT Discharge Recommendations:  (Anticipate discharge home with the need for mod assist at the walker level and will recommend first floor set-up.)  Equipment Recommended upon Discharge: Wheeled walker  PT Recommended Transfer Status: Assist x2      General Visit Information:   PT  Visit  PT Received On: 09/16/24  General  Prior to Session Communication: Bedside nurse  Patient Position Received: Up in chair, Alarm on  General Comment: Pt slightly agitated this morning d/t several blood draws    Subjective   Precautions:  Precautions  Medical Precautions: Fall precautions, Swallowing precautions  Precautions Comment: Impulsive, [place], severe L side inattention            Objective   Pain:  Pain Assessment  0-10 (Numeric) Pain Score: 0 - No pain  Cognition:  Cognition  Safety/Judgement: Exceptions to WFL  Impulsive: Moderately  Coordination:  Coordination Comment: ataxia, apraxia left side       Treatments:  Therapeutic Exercise  Therapeutic Exercise Performed:  (standing marches to targets to improve control and NASIR. Performed seated ABD with manual resistance)    Ambulation/Gait Training  Ambulation/Gait Training Performed:  (gait training 100' x3 with ww, left hand strap to maintain  and mod assist of 1 with w/c follow. Cues to keep LLE placed out to left to prevent NBOS. Pt exhibits left side inattention with 100% lack of obstacle  avoidance.)    Transfers  Transfer:  (transfers sit/stand with mod assist of , stand pivot to right with max/mod assist of 1. Quick and impulsive, lack of safety awareness.)      Education Documentation  Patient educated in safe techniques for gait with a device in order to maximize safety and functional independence.    Patient educated in safe and proper transfer techniques including proper positioning and hand/foot placement to maximize safety and functional independence.         Encounter Problems       Encounter Problems (Active)       PT Problem       STG - Bed mobility with max assist x1.   (Progressing)       Start:  09/09/24    Expected End:  09/16/24            STG - Transfers with mod assist x2. (Progressing)       Start:  09/09/24    Expected End:  09/16/24            STG - Pt will amb 20 ft in a str line with approp device and mod assist x2.   (Progressing)       Start:  09/09/24    Expected End:  09/16/24            STG - Will determine most approp means of entry into home.   (Progressing)       Start:  09/09/24    Expected End:  09/16/24            LTG - Bed mobility with mod assist x1. (Progressing)       Start:  09/09/24    Expected End:  09/23/24            LTG - Transfers with mod assist x1. (Progressing)       Start:  09/09/24    Expected End:  09/23/24            LTG - Pt will amb 50 ft with approp device and mod assist x1. (Progressing)       Start:  09/09/24    Expected End:  09/23/24            LTG - Will educate pt and family on most approp means of entry into home.   (Progressing)       Start:  09/09/24    Expected End:  09/23/24

## 2024-09-16 NOTE — PROGRESS NOTES
Occupational Therapy    OT Treatment    Patient Name: Regulo Marques  MRN: [id number]  Department: [facility] REHAB  Room: [address] [place] [address]  Today's Date: 9/16/2024  Time Calculation  Start Time: 0915  Stop Time: 1000  Time Calculation (min): 45 min        Assessment:  End of Session Communication:  (to ST session)  End of Session Patient Position: Up in chair, Alarm on     Plan:  Treatment Interventions: ADL retraining, Endurance training, Patient/family training, Neuromuscular reeducation, Functional transfer training, Visual perceptual retraining, UE strengthening/ROM, Cognitive reorientation  OT Frequency:  (5 days/week, 90 mins/day, for 2 weeks)  OT Discharge Recommendations:  (anticipate need for up to mod assist with transfers, ADLs, and mobility)  Equipment Recommended upon Discharge: Wheeled walker, Bedside commode (tub bench, reacher)  Treatment Interventions: ADL retraining, Endurance training, Patient/family training, Neuromuscular reeducation, Functional transfer training, Visual perceptual retraining, UE strengthening/ROM, Cognitive reorientation    Subjective   Previous Visit Info:  OT Last Visit  OT Received On: 09/16/24  General:  General  Prior to Session Communication:  (PT)  Patient Position Received: Up in chair, Alarm on  General Comment: flat to slightly angry affect during session  Precautions:  Hearing/Visual Limitations: [place]  Medical Precautions: Fall precautions, Swallowing precautions  Precautions Comment: left inattention            Pain:  Pain Assessment  Pain Assessment: 0-10  0-10 (Numeric) Pain Score: 0 - No pain    Objective         Activities of Daily Living: LE Dressing  LE Dressing Adaptive Equipment: Dressing stick, Reacher, Sock aide  Sock Level of Assistance:  (doffing socks with dressing stick sba and mod cues for proper placement; donning mod/max a with poor lue attention with using sock aid- apraxic/poor proprioception - not aware sock, sock aid handle no longer in hand, becoming  frustrated)  LE Dressing Where Assessed: Wheelchair       Therapy/Activity: Balance/Neuromuscular Re-Education  Balance/Neuromuscular Re-Education Activity 1: completes bilat ue dowel rafita ther ex 15 x 8 planes with occas hand over hand and mod cues for visual lue attention to improve movement  Balance/Neuromuscular Re-Education Activity 2: completes gmc/grasp and release task using hula hoop in rt and lt horizontal directions, then, vertical directions up and then down. Mod cues for proper techs, improvement with repetition, cues for attending to lue.  Balance/Neuromuscular Re-Education Activity 3: fmc with lt finger opposition exercises, pt able to complete slowly with min difficulty to all digits except for 5th digit -moderate difficulty; hep issued       Vision:  Vision Comments: visual scanning to left for left ue finger to nose and then targeting OTR's finger in various left side positions          EDUCATION:  Education  Individual(s) Educated: Patient  Education Provided:  (goals of session, adl techs with ae; left attention strategies)  Patient Response to Education:  (needs continued education)    Goals:  Encounter Problems       Encounter Problems (Active)       OT Problem       LTG - Patient will complete eating with set up. (Progressing)       Start:  09/09/24    Expected End:  09/23/24            LTG - Patient will complete grooming with sba. (Progressing)       Start:  09/09/24    Expected End:  09/23/24            LTG - Patient will complete toileting with mod assist. (Progressing)       Start:  09/09/24    Expected End:  09/23/24            LTG - Patient will complete bathing with min/mod assist. (Progressing)       Start:  09/09/24    Expected End:  09/23/24            LTG - Patient will complete UE dressing with sba. (Progressing)       Start:  09/09/24    Expected End:  09/23/24            LTG - Patient will complete LE dressing using adaptive equip as needed with mod assist. (Progressing)        Start:  09/09/24    Expected End:  09/23/24            LTG - Patient will complete commode transfer via pivot or device as needed with mod assist. (Progressing)       Start:  09/09/24    Expected End:  09/23/24            LTG - Patient will complete tub/shower transfer using DME as needed with mod/max assist. (Progressing)       Start:  09/09/24    Expected End:  09/23/24            LTG - Patient will complete simple mobility using device as needed with mod assist. (Progressing)       Start:  09/09/24    Expected End:  09/23/24            LTG - Patient will complete wheelchair mobility with sba. (Progressing)       Start:  09/09/24    Expected End:  09/23/24            STG - Patient will complete grooming with min assist. (Progressing)       Start:  09/09/24    Expected End:  09/18/24            STG - Patient will complete toileting with mod assist x2. (Progressing)       Start:  09/09/24    Expected End:  09/18/24            STG - Patient will complete sponge bathing with mod/max assist. (Progressing)       Start:  09/09/24    Expected End:  09/18/24            STG - Patient will complete UE dressing with mod assist. (Met)       Start:  09/09/24    Expected End:  09/16/24    Resolved:  09/11/24    Updated to: STG - Patient will complete UE dressing with min assist.    Update reason: STG met         STG - Patient will complete LE dressing using adaptive equip as needed with mod assist x2. (Met)       Start:  09/09/24    Expected End:  09/16/24    Resolved:  09/11/24    Updated to: STG - Patient will complete LE dressing using adaptive equip as needed with min assist x2.    Update reason: STG met         STG - Patient will complete commode transfer via device as needed with min assist x2. (Progressing)       Start:  09/09/24    Expected End:  09/18/24            STG - Patient will complete simple wheelchair mobility with min assist. (Progressing)       Start:  09/09/24    Expected End:  09/18/24            STG -  Patient will complete UE dressing with min assist. (Progressing)       Start:  09/11/24    Expected End:  09/18/24                STG - Patient will complete LE dressing using adaptive equip as needed with min assist x2. (Progressing)       Start:  09/11/24    Expected End:  09/18/24

## 2024-09-16 NOTE — PROGRESS NOTES
Occupational Therapy    OT Treatment    Patient Name: Regulo Marques  MRN: [id number]  Department: [facility] REHAB  Room: [address] [place] [address]  Today's Date: 9/16/2024  Time Calculation  Start Time: 1300  Stop Time: 1345  Time Calculation (min): 45 min        Assessment:  End of Session Communication: Bedside nurse  End of Session Patient Position: Up in chair, Alarm on     Plan:  Treatment Interventions: ADL retraining, Endurance training, Patient/family training, Neuromuscular reeducation, Functional transfer training, Visual perceptual retraining, UE strengthening/ROM, Cognitive reorientation  OT Frequency:  (5 days/week, 90 mins/day, for 2 weeks)  OT Discharge Recommendations:  (anticipate need for up to mod assist with transfers, ADLs, and mobility)  Equipment Recommended upon Discharge: Wheeled walker, Bedside commode (tub bench, reacher)  Treatment Interventions: ADL retraining, Endurance training, Patient/family training, Neuromuscular reeducation, Functional transfer training, Visual perceptual retraining, UE strengthening/ROM, Cognitive reorientation    Subjective   Previous Visit Info:  OT Last Visit  OT Received On: 09/16/24  General:  General  Prior to Session Communication: Bedside nurse  Patient Position Received: Up in chair, Alarm on  General Comment: Patient agreeable to OT session. Asleep in recliner upon arrival. L inattention  Precautions:  Hearing/Visual Limitations: [place]  Medical Precautions: Fall precautions, Swallowing precautions  Precautions Comment: Impulsive, [place] and L inattention    Pain:  Pain Assessment  Pain Assessment: 0-10  0-10 (Numeric) Pain Score: 0 - No pain    Bed Mobility/Transfers: Transfers  Transfer: Yes  Transfer 1  Trials/Comments 1: Sit-stand transfer with Mod assist x1, cues for hand placement  Transfers 2  Trials/Comments 2: Repeated pivot transfer training to R side with Mod assist x1, from recliner, wheelchair, commode. Min cues for safety however patient doesn't follow.    Toilet  Transfers  Toilet Transfers Comments: on and off commode with Mod ax1    Therapy/Activity: Balance/Neuromuscular Re-Education  Balance/Neuromuscular Re-Education Activity 1: completes transfer of graded clothes pins overhead from RUE>LUE to promote proprioceptive/kinesthetic awareness, overall mod difficulty. Pt unable to fully range bilat shoulder into extension overhead. As tasks progresses, patient drops clothes pins 2/2 fatigue, weakness, and inattention. Applies clothes pins to rafita with Moderate difficulty.  Balance/Neuromuscular Re-Education Activity 2: Pt placed cubes on velcro board with LUE. Removed with LUE and placed into cup with moderate difficulty for entire task 2/2 fatigue, weakness and inattention.   EDUCATION:  Education  Individual(s) Educated: Patient  Education Comment: Education provided on safety during transfers, midline awareness, dressing safety.  End of session communication- Bedside nurse   End of session patient position- Up in chair;Alarm on;   Goals:  Encounter Problems       Encounter Problems (Active)       OT Problem       LTG - Patient will complete eating with set up. (Progressing)       Start:  09/09/24    Expected End:  09/23/24            LTG - Patient will complete grooming with sba. (Progressing)       Start:  09/09/24    Expected End:  09/23/24            LTG - Patient will complete toileting with mod assist. (Progressing)       Start:  09/09/24    Expected End:  09/23/24            LTG - Patient will complete bathing with min/mod assist. (Progressing)       Start:  09/09/24    Expected End:  09/23/24            LTG - Patient will complete UE dressing with sba. (Progressing)       Start:  09/09/24    Expected End:  09/23/24            LTG - Patient will complete LE dressing using adaptive equip as needed with mod assist. (Progressing)       Start:  09/09/24    Expected End:  09/23/24            LTG - Patient will complete commode transfer via pivot or device as needed with  mod assist. (Progressing)       Start:  09/09/24    Expected End:  09/23/24            LTG - Patient will complete tub/shower transfer using DME as needed with mod/max assist. (Progressing)       Start:  09/09/24    Expected End:  09/23/24            LTG - Patient will complete simple mobility using device as needed with mod assist. (Progressing)       Start:  09/09/24    Expected End:  09/23/24            LTG - Patient will complete wheelchair mobility with sba. (Progressing)       Start:  09/09/24    Expected End:  09/23/24            STG - Patient will complete grooming with min assist. (Progressing)       Start:  09/09/24    Expected End:  09/18/24            STG - Patient will complete toileting with mod assist x2. (Progressing)       Start:  09/09/24    Expected End:  09/18/24            STG - Patient will complete sponge bathing with mod/max assist. (Progressing)       Start:  09/09/24    Expected End:  09/18/24            STG - Patient will complete UE dressing with mod assist. (Met)       Start:  09/09/24    Expected End:  09/16/24    Resolved:  09/11/24    Updated to: STG - Patient will complete UE dressing with min assist.    Update reason: STG met         STG - Patient will complete LE dressing using adaptive equip as needed with mod assist x2. (Met)       Start:  09/09/24    Expected End:  09/16/24    Resolved:  09/11/24    Updated to: STG - Patient will complete LE dressing using adaptive equip as needed with min assist x2.    Update reason: STG met         STG - Patient will complete commode transfer via device as needed with min assist x2. (Progressing)       Start:  09/09/24    Expected End:  09/18/24            STG - Patient will complete simple wheelchair mobility with min assist. (Progressing)       Start:  09/09/24    Expected End:  09/18/24            STG - Patient will complete UE dressing with min assist. (Progressing)       Start:  09/11/24    Expected End:  09/18/24                STG -  Patient will complete LE dressing using adaptive equip as needed with min assist x2. (Progressing)       Start:  09/11/24    Expected End:  09/18/24

## 2024-09-16 NOTE — DISCHARGE SUMMARY
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   * acetaminophen 650 mg/20.3 mL solution oral liquid; Commonly known as:   Tylenol; Take 20.3 mL (650 mg) by mouth every 4 hours if needed for fever   (temp greater than 38.0 C) or pain.   * bisacodyl 5 mg EC tablet; Commonly known as: Dulcolax; Take 2 tablets   (10 mg) by mouth once daily as needed for constipation. Do not crush,   chew, or split.   * bisacodyl 10 mg suppository; Commonly known as: Dulcolax; Insert 1   suppository (10 mg) into the rectum once daily as needed for constipation.   insulin lispro 100 unit/mL injection; Commonly known as: HumaLOG; Inject   0-5 Units under the skin 3 times daily (morning, midday, late afternoon).   Take as directed per insulin instructions.   sennosides 8.6 mg tablet; Commonly known as: Senokot; Take 1 tablet (8.6   mg) by mouth once daily at bedtime.  * This list has 4 medication(s) that are the same as other medications   prescribed for you. Read the directions carefully, and ask your doctor or   other care provider to review them with you.     CHANGE how you take these medications     tamsulosin 0.4 mg 24 hr capsule; Commonly known as: Flomax; Take 2   capsules (0.8 mg) by mouth once daily at bedtime.; What changed: how much   to take, when to take this     CONTINUE taking these medications     atorvastatin 80 mg tablet; Commonly known as: Lipitor; Take 1 tablet (80   mg) by mouth once daily at bedtime.   PRESERVISION AREDS ORAL       Test Results Pending At Discharge  Pending Labs       No current pending labs.            Hospital Course   Regulo Marques is a 83 y.o.  male ADMITTED TO [facility] 9/2/24 presenting to the ER with acute onset of left sided weakness, expressive aphasia, dysarthria, mental status change, and slurred speech. Pt. Was out to dinner with his wife tonight when these symptoms developed. He was apparently in his normal state of health prior to dinner. He was taking Eliquis for A-Fib but was taken off of the Eliquis on 8/12/2024 2/2 an admission at [facility] for a GI bleed. Pt. 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     Patient does have the recent history of a GI bleed also with a history of hematuria.  He was started on aspirin by neuro.  There is a discussion with the family regarding starting anticoagulation but they do not want to until cleared by GI.    Patient participated in 3 hours of PT OT and speech therapy he did show mild improvements to the point he was mod to max assist of 2 for functional mobility.  He did have left hemiparesis left neglect, dysphagia.  He also had a question of restarting anticoagulation.  After discussion with cardiology, neurology and GI Eliquis was restarted after patient request.  He was started on Eliquis 2.5 mg twice daily.  His H&H was 7.5 and a drop to 6.7.  Eliquis was stopped.  GI was reconsulted and he is going to be transferred to the acute medical floor for further workup.    Patient family and medical team are in agreement for further workup and pending plan of care.    Patient will need ongoing rehab because he is assist of 2 people.  He will be unable to be discharged home with his wife.  Needs skilled nursing rehab after acute rehab due to his limited progress and plan to go to a skilled facility after acute rehab.  This was communicated to the patient his wife and his family.    Patient's blood pressure remained stable throughout his stay.  He is on very limited medications.  Blood sugars also remain stable.    He did have urinary retention.  Flomax was increased to 0.8 mg nightly.   He tolerated it well and was in pendantly urinating.        Pertinent Physical Exam At Time of Discharge  Physical Exam  Pleasant male no apparent distress alert and oriented x 3.  Left inattention and left neglect  Chest clear to auscultation bilaterally  Cardiovascular S1-S2 regular  Abdomen soft nontender mildly distended with midline incision old and healed well.  Mild lower extremity edema +1 negative Homans bilaterally and PVD changes present.  Left hemiparesis upper greater than lower extremity and left neglect and inattention.  Dysphagia  Functionally at a mod to max assist of 2.      Outpatient Follow-Up  No future appointments.  Pending acute hospital stay.      Time spent with the patient today for discharge including: history, physical exam, coordination of care with the rehab team members, communication with patient and/or family, medication reconciliation and documentation was 35 minutes.      Allison Díaz MD

## 2024-09-16 NOTE — PROGRESS NOTES
Speech-Language Pathology    SLP Adult IRF Speech-Language Pathology Treatment     Patient Name: Regulo Marques  MRN: [id number]  Today's Date: 9/16/2024  Time Calculation  Start Time: 1000  Stop Time: 1045  Time Calculation (min): 45 min     SLP Assessment:  SLP TX Intervention Outcome: Making Progress Towards Goals  Prognosis: Good  Treatment Tolerance: Patient tolerated treatment well       DYSPHAGIA LTG: estabished 9/9/24   Patient will tolerate the LRD without clinical s/s of dysphagia; PROGRESSING   COGNITIVE LTG: established 9/9/24              1. Patient will demonstrate improved cognition for return home at a min assist level of care; PROGRESSING  SPEECH LTG: established 9/9/24   Patient will demonstrate improved speech production for communication in all settings at a independent level of care; PROGRESSING    Plan:  Inpatient/Swing Bed or Outpatient: Inpatient  SLP Frequency: 5x per week  Duration: 2 weeks  SLP Discharge Recommendations: Home SLP  Discussed POC: Patient  Discussed Risks/Benefits: Yes  Continue POC established at weekly team conference 9/12/24     Subjective   Current Problem:  Dysphagia/dysarthria/cognitive deficits     Most Recent Visit:  SLP Received On: 09/16/24    General Visit Information:    Patient frustrated by other medical issues this date; emotional support provided.    Pain Assessment:  Pain Assessment  Pain Assessment: 0-10  0-10 (Numeric) Pain Score: 0 - No pain  Clinical Progression: Gradually improving      Objective       Therapeutic Swallow:  Therapeutic Swallow Intervention : PO Trials  Pharyngeal Strengthening Techniques: Chin Tuck Against Resistance  Swallow Comments: Initiate Dc Free Water  Trialed thin liquids with date; tolerated well with controlled single sips.  Plan to initiate Dc Free Water Protocol this date.  Chin tuck against resistance completed x30.    Motor Speech:   Motor Speech Intervention : Sentence Repetitions  Scrambled sentences with  embedded speech production task with 90% intelligibility.    Cognitive Linguistics:  Cognitive Linguistics Interventions: Attention, Organization  Orientation is poor with inability to recall month of the year.  With cues able to re-orient.    Functional organization task completed with 100% accuracy given minimal cues.  Read time on analog clocks with 90% accuracy this date.  Low level attention task completed timely and with 100% accuracy.    Inpatient:  Education Documentation  Education provided as it relates to POC and progress.  Individual(s) Educated: Patient  Verbal Education : yes  Patient/Caregiver Demonstrated Understanding: yes  Plan of Care Discussed and Agreed Upon: yes

## 2024-09-16 NOTE — PROGRESS NOTES
"Nutrition Follow Up Assessment:     Nutrition History:  Energy Intake: Poor < 50 %  Food and Nutrient History: Pt was out of room working with therapy at time of attempted visit today.  Per review of EMR, pt is averaging 37% of meals only. -204.  Last BM 9/13.  Will continue with Soft bite sized diet, mildly thick liquids with Mighty shakes with all meals and Magic cup twice daily.  May need to consider appetite stimulant.  Food Allergies/Intolerances:  None  GI Symptoms: None  Oral Problems:  texture modified diet with thickened liquids        Anthropometrics:  Height: 175.3 cm (5' 9.02\")   Weight: 93 kg (205 lb 0.4 oz)   BMI (Calculated): 30.26  IBW/kg (Dietitian Calculated): 72.7 kg  Percent of IBW: 128 %       Weight History:     Weight Change %:   No new weight to assess    Nutrition Focused Physical Exam Findings:  defer: not available   Subcutaneous Fat Loss:   Orbital Fat Pads: Defer  Muscle Wasting:     Edema:  Edema: +1 trace  Edema Location: BLE  Physical Findings:  Skin: Negative    Nutrition Significant Labs:  BMP Trend:   Results from last 7 days   Lab Units 09/15/24  0713 09/13/24  0639 09/12/24  0711   GLUCOSE mg/dL 126* 123* 123*   CALCIUM mg/dL 9.0 9.1 9.1   SODIUM mmol/L 138 138 139   POTASSIUM mmol/L 4.6 4.3 4.3   CO2 mmol/L 22 24 23   CHLORIDE mmol/L 109* 108* 109*   BUN mg/dL 48* 40* 37*   CREATININE mg/dL 1.66* 1.28 1.61*    , BG POCT trend:   Results from last 7 days   Lab Units 09/16/24  1121 09/16/24  0833 09/16/24  0621 09/15/24  1626 09/15/24  1136   POCT GLUCOSE mg/dL 204* 188* 129* 147* 166*        Nutrition Specific Medications:  Reviewed     I/O:   Last BM Date: 09/13/24; Stool Appearance: Formed (09/12/24 1900)    Dietary Orders (From admission, onward)       Start     Ordered    09/10/24 1236  Adult diet Regular; Soft and bite sized 6; Mild thick 2  Diet effective now        Question Answer Comment   Diet type Regular    Texture Soft and bite sized 6    Fluid consistency Mild " thick 2        09/10/24 1236    09/09/24 1203  Oral nutritional supplements  Until discontinued        Question Answer Comment   Deliver with Breakfast    Deliver with Dinner    Deliver with Lunch    Select supplement: Mighty Shake        09/09/24 1202    09/09/24 1202  Oral nutritional supplements  Until discontinued        Question Answer Comment   Deliver with Lunch    Deliver with Dinner    Select supplement: Magic Cup        09/09/24 1202                     Estimated Needs:      Method for Estimating Needs: 1700-1900kcals (18-20kcals/kg ABW)     Method for Estimating Needs: 74-93g (0.8-1.0g/kg ABW)     Method for Estimating Needs: 1 mL/kcal or as per MD        Nutrition Diagnosis   Malnutrition Diagnosis  Patient has Malnutrition Diagnosis: No    Nutrition Diagnosis  Patient has Nutrition Diagnosis: Yes  Diagnosis Status (1): Ongoing  Nutrition Diagnosis 1: Inadequate oral intake  Related to (1): acute illness, new CVA  As Evidenced by (1): 0-33% of meals  Additional Assessment Information (1): averaging 37%  Additional Nutrition Diagnosis: Diagnosis 2  Diagnosis Status (2): Ongoing  Nutrition Diagnosis 2: Increased nutrient needs  Related to (2): physiological causes increasing nutrient needs  As Evidenced by (2): unresectable rectal cancer s/p chemo and radiation; acute rehab demands with increase in physical activity       Nutrition Interventions/Recommendations         Nutrition Prescription:  Individualized Nutrition Prescription Provided for : Soft bit size diet, mildly thick liquids with Mighty shakes with all meals and Magic cup twice daily        Nutrition Interventions:   Interventions: Meals and snacks, Medical food supplement  Meals and Snacks: Texture-modified diet, Fluid-modified diet  Goal: consume >50->75% of meals--not met/ongoing  Medical Food Supplement: Commercial beverage, Commercial food  Goal: consume >75% of Mighty shakes with all meals (for an additional 220 kcals, 6 gm protein  each)  Additional Interventions: consume >75% of Magic cup twice daily (for an additional 290 kcals, 9 gm protein each)         Nutrition Education:   N/A       Nutrition Monitoring and Evaluation   Food/Nutrient Related History Monitoring  Monitoring and Evaluation Plan: Energy intake, Fluid intake, Amount of food  Energy Intake: Estimated energy intake  Criteria: Meal/ONS intake to meet >75% of estimated needs  Fluid Intake: Estimated fluid intake  Criteria: fluid intake to meet >75% of estimated need  Amount of Food: Estimated amout of food, Medical food intake  Criteria: Pt to consume >75% of meals/ONS    Body Composition/Growth/Weight History  Monitoring and Evaluation Plan: Weight  Weight: Measured weight  Criteria: reweigh at least every 5 days    Biochemical Data, Medical Tests and Procedures  Monitoring and Evaluation Plan: Electrolyte/renal panel, Glucose/endocrine profile  Criteria: labs WNL  Criteria: BG within acceptable range    Nutrition Focused Physical Findings  Monitoring and Evaluation Plan: Digestive System  Criteria: BM at least every 2-3 days         Time Spent (min): 15 minutes

## 2024-09-17 ENCOUNTER — APPOINTMENT (OUTPATIENT)
Dept: RADIOLOGY | Facility: HOSPITAL | Age: 84
DRG: 812 | End: 2024-09-17
Payer: MEDICARE

## 2024-09-17 ENCOUNTER — APPOINTMENT (OUTPATIENT)
Dept: RADIOLOGY | Facility: HOSPITAL | Age: 84
DRG: 811 | End: 2024-09-17
Payer: MEDICARE

## 2024-09-17 LAB
ANION GAP SERPL CALC-SCNC: 12 MMOL/L (ref 10–20)
BUN SERPL-MCNC: 47 MG/DL (ref 6–23)
CALCIUM SERPL-MCNC: 8.8 MG/DL (ref 8.6–10.3)
CHLORIDE SERPL-SCNC: 108 MMOL/L (ref 98–107)
CO2 SERPL-SCNC: 22 MMOL/L (ref 21–32)
CREAT SERPL-MCNC: 1.39 MG/DL (ref 0.5–1.3)
EGFRCR SERPLBLD CKD-EPI 2021: 50 ML/MIN/1.73M*2
ERYTHROCYTE [DISTWIDTH] IN BLOOD BY AUTOMATED COUNT: 16.7 % (ref 11.5–14.5)
GLUCOSE BLD MANUAL STRIP-MCNC: 115 MG/DL (ref 74–99)
GLUCOSE BLD MANUAL STRIP-MCNC: 116 MG/DL (ref 74–99)
GLUCOSE BLD MANUAL STRIP-MCNC: 127 MG/DL (ref 74–99)
GLUCOSE SERPL-MCNC: 115 MG/DL (ref 74–99)
HCT VFR BLD AUTO: 24.4 % (ref 41–52)
HGB BLD-MCNC: 7.6 G/DL (ref 13.5–17.5)
MCH RBC QN AUTO: 30.2 PG (ref 26–34)
MCHC RBC AUTO-ENTMCNC: 31.1 G/DL (ref 32–36)
MCV RBC AUTO: 97 FL (ref 80–100)
NRBC BLD-RTO: 0 /100 WBCS (ref 0–0)
PLATELET # BLD AUTO: 211 X10*3/UL (ref 150–450)
POTASSIUM SERPL-SCNC: 4.7 MMOL/L (ref 3.5–5.3)
RBC # BLD AUTO: 2.52 X10*6/UL (ref 4.5–5.9)
SODIUM SERPL-SCNC: 137 MMOL/L (ref 136–145)
WBC # BLD AUTO: 7.3 X10*3/UL (ref 4.4–11.3)

## 2024-09-17 PROCEDURE — 2500000001 HC RX 250 WO HCPCS SELF ADMINISTERED DRUGS (ALT 637 FOR MEDICARE OP): Performed by: INTERNAL MEDICINE

## 2024-09-17 PROCEDURE — 1100000001 HC PRIVATE ROOM DAILY

## 2024-09-17 PROCEDURE — 74018 RADEX ABDOMEN 1 VIEW: CPT

## 2024-09-17 PROCEDURE — 80048 BASIC METABOLIC PNL TOTAL CA: CPT | Performed by: INTERNAL MEDICINE

## 2024-09-17 PROCEDURE — 82947 ASSAY GLUCOSE BLOOD QUANT: CPT

## 2024-09-17 PROCEDURE — 99232 SBSQ HOSP IP/OBS MODERATE 35: CPT

## 2024-09-17 PROCEDURE — 36415 COLL VENOUS BLD VENIPUNCTURE: CPT | Performed by: INTERNAL MEDICINE

## 2024-09-17 PROCEDURE — 2500000002 HC RX 250 W HCPCS SELF ADMINISTERED DRUGS (ALT 637 FOR MEDICARE OP, ALT 636 FOR OP/ED): Performed by: INTERNAL MEDICINE

## 2024-09-17 PROCEDURE — 85027 COMPLETE CBC AUTOMATED: CPT | Performed by: INTERNAL MEDICINE

## 2024-09-17 PROCEDURE — 2500000001 HC RX 250 WO HCPCS SELF ADMINISTERED DRUGS (ALT 637 FOR MEDICARE OP): Performed by: STUDENT IN AN ORGANIZED HEALTH CARE EDUCATION/TRAINING PROGRAM

## 2024-09-17 PROCEDURE — 74018 RADEX ABDOMEN 1 VIEW: CPT | Performed by: STUDENT IN AN ORGANIZED HEALTH CARE EDUCATION/TRAINING PROGRAM

## 2024-09-17 PROCEDURE — 74018 RADEX ABDOMEN 1 VIEW: CPT | Performed by: RADIOLOGY

## 2024-09-17 RX ORDER — ATORVASTATIN CALCIUM 80 MG/1
80 TABLET, FILM COATED ORAL NIGHTLY
Status: DISPENSED | OUTPATIENT
Start: 2024-09-17

## 2024-09-17 RX ORDER — SENNOSIDES 8.6 MG/1
1 TABLET ORAL NIGHTLY
Status: DISPENSED | OUTPATIENT
Start: 2024-09-17

## 2024-09-17 RX ORDER — POLYETHYLENE GLYCOL 3350, SODIUM CHLORIDE, SODIUM BICARBONATE, POTASSIUM CHLORIDE 420; 11.2; 5.72; 1.48 G/4L; G/4L; G/4L; G/4L
4000 POWDER, FOR SOLUTION ORAL ONCE
Status: COMPLETED | OUTPATIENT
Start: 2024-09-17 | End: 2024-09-17

## 2024-09-17 RX ORDER — DEXTROSE 50 % IN WATER (D50W) INTRAVENOUS SYRINGE
12.5
Status: ACTIVE | OUTPATIENT
Start: 2024-09-17

## 2024-09-17 RX ORDER — NAPROXEN SODIUM 220 MG/1
81 TABLET, FILM COATED ORAL DAILY
Status: DISPENSED | OUTPATIENT
Start: 2024-09-17

## 2024-09-17 RX ORDER — TAMSULOSIN HYDROCHLORIDE 0.4 MG/1
0.8 CAPSULE ORAL NIGHTLY
Status: DISPENSED | OUTPATIENT
Start: 2024-09-17

## 2024-09-17 RX ORDER — DEXTROSE 50 % IN WATER (D50W) INTRAVENOUS SYRINGE
25
Status: ACTIVE | OUTPATIENT
Start: 2024-09-17

## 2024-09-17 RX ORDER — ACETAMINOPHEN 160 MG/5ML
650 SOLUTION ORAL EVERY 4 HOURS PRN
Status: ACTIVE | OUTPATIENT
Start: 2024-09-17

## 2024-09-17 RX ORDER — ACETAMINOPHEN 325 MG/1
650 TABLET ORAL EVERY 6 HOURS PRN
Status: ACTIVE | OUTPATIENT
Start: 2024-09-17

## 2024-09-17 RX ORDER — INSULIN LISPRO 100 [IU]/ML
0-10 INJECTION, SOLUTION INTRAVENOUS; SUBCUTANEOUS
Status: DISCONTINUED | OUTPATIENT
Start: 2024-09-17 | End: 2024-09-17

## 2024-09-17 RX ORDER — BISACODYL 10 MG/1
10 SUPPOSITORY RECTAL DAILY PRN
Status: ACTIVE | OUTPATIENT
Start: 2024-09-17

## 2024-09-17 RX ORDER — INSULIN LISPRO 100 [IU]/ML
0-5 INJECTION, SOLUTION INTRAVENOUS; SUBCUTANEOUS
Status: ACTIVE | OUTPATIENT
Start: 2024-09-17

## 2024-09-17 SDOH — SOCIAL STABILITY: SOCIAL INSECURITY: DOES ANYONE TRY TO KEEP YOU FROM HAVING/CONTACTING OTHER FRIENDS OR DOING THINGS OUTSIDE YOUR HOME?: NO

## 2024-09-17 SDOH — SOCIAL STABILITY: SOCIAL INSECURITY: ARE YOU OR HAVE YOU BEEN THREATENED OR ABUSED PHYSICALLY, EMOTIONALLY, OR SEXUALLY BY ANYONE?: NO

## 2024-09-17 SDOH — SOCIAL STABILITY: SOCIAL INSECURITY: HAS ANYONE EVER THREATENED TO HURT YOUR FAMILY OR YOUR PETS?: NO

## 2024-09-17 SDOH — SOCIAL STABILITY: SOCIAL INSECURITY: DO YOU FEEL UNSAFE GOING BACK TO THE PLACE WHERE YOU ARE LIVING?: NO

## 2024-09-17 SDOH — SOCIAL STABILITY: SOCIAL INSECURITY: ARE THERE ANY APPARENT SIGNS OF INJURIES/BEHAVIORS THAT COULD BE RELATED TO ABUSE/NEGLECT?: NO

## 2024-09-17 SDOH — SOCIAL STABILITY: SOCIAL INSECURITY: WERE YOU ABLE TO COMPLETE ALL THE BEHAVIORAL HEALTH SCREENINGS?: YES

## 2024-09-17 SDOH — SOCIAL STABILITY: SOCIAL INSECURITY: ABUSE: ADULT

## 2024-09-17 SDOH — SOCIAL STABILITY: SOCIAL INSECURITY: DO YOU FEEL ANYONE HAS EXPLOITED OR TAKEN ADVANTAGE OF YOU FINANCIALLY OR OF YOUR PERSONAL PROPERTY?: NO

## 2024-09-17 SDOH — SOCIAL STABILITY: SOCIAL INSECURITY: HAVE YOU HAD ANY THOUGHTS OF HARMING ANYONE ELSE?: NO

## 2024-09-17 SDOH — SOCIAL STABILITY: SOCIAL INSECURITY: HAVE YOU HAD THOUGHTS OF HARMING ANYONE ELSE?: NO

## 2024-09-17 ASSESSMENT — COGNITIVE AND FUNCTIONAL STATUS - GENERAL
EATING MEALS: A LOT
PERSONAL GROOMING: A LOT
MOVING FROM LYING ON BACK TO SITTING ON SIDE OF FLAT BED WITH BEDRAILS: A LOT
TURNING FROM BACK TO SIDE WHILE IN FLAT BAD: A LOT
TOILETING: A LOT
CLIMB 3 TO 5 STEPS WITH RAILING: TOTAL
TURNING FROM BACK TO SIDE WHILE IN FLAT BAD: A LOT
PATIENT BASELINE BEDBOUND: NO
DRESSING REGULAR UPPER BODY CLOTHING: A LOT
MOBILITY SCORE: 10
WALKING IN HOSPITAL ROOM: TOTAL
TOILETING: A LOT
DRESSING REGULAR LOWER BODY CLOTHING: A LOT
DAILY ACTIVITIY SCORE: 12
HELP NEEDED FOR BATHING: A LOT
WALKING IN HOSPITAL ROOM: TOTAL
EATING MEALS: A LOT
PERSONAL GROOMING: A LOT
MOVING FROM LYING ON BACK TO SITTING ON SIDE OF FLAT BED WITH BEDRAILS: A LOT
STANDING UP FROM CHAIR USING ARMS: A LOT
CLIMB 3 TO 5 STEPS WITH RAILING: TOTAL
MOVING TO AND FROM BED TO CHAIR: A LOT
STANDING UP FROM CHAIR USING ARMS: A LOT
DAILY ACTIVITIY SCORE: 12
DRESSING REGULAR LOWER BODY CLOTHING: A LOT
MOVING TO AND FROM BED TO CHAIR: A LOT
MOBILITY SCORE: 10
DRESSING REGULAR UPPER BODY CLOTHING: A LOT
HELP NEEDED FOR BATHING: A LOT

## 2024-09-17 ASSESSMENT — LIFESTYLE VARIABLES
SKIP TO QUESTIONS 9-10: 1
AUDIT-C TOTAL SCORE: 0
SUBSTANCE_ABUSE_PAST_12_MONTHS: NO
HOW OFTEN DO YOU HAVE 6 OR MORE DRINKS ON ONE OCCASION: NEVER
PRESCIPTION_ABUSE_PAST_12_MONTHS: NO
HOW MANY STANDARD DRINKS CONTAINING ALCOHOL DO YOU HAVE ON A TYPICAL DAY: PATIENT DOES NOT DRINK
HOW OFTEN DO YOU HAVE A DRINK CONTAINING ALCOHOL: NEVER
AUDIT-C TOTAL SCORE: 0

## 2024-09-17 ASSESSMENT — COLUMBIA-SUICIDE SEVERITY RATING SCALE - C-SSRS
2. HAVE YOU ACTUALLY HAD ANY THOUGHTS OF KILLING YOURSELF?: NO
1. IN THE PAST MONTH, HAVE YOU WISHED YOU WERE DEAD OR WISHED YOU COULD GO TO SLEEP AND NOT WAKE UP?: NO
6. HAVE YOU EVER DONE ANYTHING, STARTED TO DO ANYTHING, OR PREPARED TO DO ANYTHING TO END YOUR LIFE?: NO

## 2024-09-17 ASSESSMENT — PATIENT HEALTH QUESTIONNAIRE - PHQ9
1. LITTLE INTEREST OR PLEASURE IN DOING THINGS: NOT AT ALL
2. FEELING DOWN, DEPRESSED OR HOPELESS: NOT AT ALL
SUM OF ALL RESPONSES TO PHQ9 QUESTIONS 1 & 2: 0

## 2024-09-17 ASSESSMENT — ACTIVITIES OF DAILY LIVING (ADL)
PATIENT'S MEMORY ADEQUATE TO SAFELY COMPLETE DAILY ACTIVITIES?: NO
ADEQUATE_TO_COMPLETE_ADL: YES
HEARING - LEFT EAR: DIFFICULTY WITH NOISE
JUDGMENT_ADEQUATE_SAFELY_COMPLETE_DAILY_ACTIVITIES: NO
FEEDING YOURSELF: DEPENDENT
BATHING: DEPENDENT
TOILETING: DEPENDENT
FEEDING YOURSELF: DEPENDENT
BATHING: DEPENDENT
PATIENT'S MEMORY ADEQUATE TO SAFELY COMPLETE DAILY ACTIVITIES?: NO
HEARING - RIGHT EAR: DIFFICULTY WITH NOISE
HEARING - RIGHT EAR: DIFFICULTY WITH NOISE
ADEQUATE_TO_COMPLETE_ADL: YES
WALKS IN HOME: DEPENDENT
ASSISTIVE_DEVICE: EYEGLASSES;DENTURES PARTIAL;WALKER
JUDGMENT_ADEQUATE_SAFELY_COMPLETE_DAILY_ACTIVITIES: NO
WALKS IN HOME: DEPENDENT
HEARING - LEFT EAR: DIFFICULTY WITH NOISE
DRESSING YOURSELF: INDEPENDENT
DRESSING YOURSELF: DEPENDENT
GROOMING: DEPENDENT
GROOMING: DEPENDENT

## 2024-09-17 ASSESSMENT — PAIN SCALES - GENERAL: PAINLEVEL_OUTOF10: 0 - NO PAIN

## 2024-09-17 NOTE — H&P
83m pmh of htn, lipids, obesity, remote colon ca, more recently w/afib/chf, gu bleeding; gib on eliquis; hosp 3-4 wks ago at  w/ugib requiring transfusion; had eliquis stopped and had r mca stroke; put back on eliquis in acute rehab and had hgb fairly promptly return to 6.7; readmitted to my service; transfused, had eliquis re-stopped (I has opposed to restarting it); now gi planning for colonoscopy; pt currently resting comfortably in chair  Pmh: as above  Soch; prior comm dweller; no sig etoh/tob  Famh; noncont  All/meds/labs noted  Exam: afvss (jovan roy); in chair, comfortable; cta irreg soft nt 2+ le edema   A/p: recurrent sig anemia on eliquis; recent r mca stroke, afib, chf, mild ckd, htn, lipid, et al  Would abandon all anticoag permanently; gi c/s for eval; will try 81 asa, follow labs; pt/ot; per acute rehab attending he will be needing snf  Assessment & Plan

## 2024-09-17 NOTE — CARE PLAN
The patient's goals for the shift include      The clinical goals for the shift include remain HDS    Over the shift, the patient did not make progress toward the following goals. Barriers to progression include N/A. Recommendations to address these barriers include N/A.

## 2024-09-17 NOTE — PROGRESS NOTES
Regulo Marques is a 83 y.o. male on day 1 of admission presenting with No Principal Problem: There is no principal problem currently on the Problem List. Please update the Problem List and refresh..      Subjective   Patient was seen and examined this morning.  No acute events overnight.    Objective     Last Recorded Vitals  /61   Pulse 52   Temp 36.3 °C (97.3 °F)   Resp 16   SpO2 100%   Intake/Output last 3 Shifts:  No intake or output data in the 24 hours ending 09/17/24 1202    Admission Weight       Daily Weight  09/09/24 : 93 kg (205 lb 0.4 oz)    Image Results  FL modified barium swallow study  Narrative: Interpreted By:  Arun Abernathy,  Anibal Kolb   STUDY:  FL MODIFIED BARIUM SWALLOW STUDY;; 9/5/2024 12:04 pm      INDICATION:  Signs/Symptoms:dysphagia.          COMPARISON:  None.      ACCESSION NUMBER(S):  [id number]      ORDERING CLINICIAN:  JESSICA CANALES      TECHNIQUE:  Modified Barium Swallow Study completed. Informed verbal consent  obtained prior to completion of exam. Pt upright in hausted chair, on  room air. Trials of thin, nectar/mildly thick liquid,  honey/moderately thick liquid, puree, were given. Of note, this study  was complated on 9/5/24 and images were initially reviewed in the  radiology suite.  However, final review of images and report was  delayed by one day d/t lengthy transfer of images from c-arm used for  this study.      SLP: Kennedi Hanley, CCC-SLP  Contact info: secure chat please      SPEECH FINDINGS:  DIET RECOMMENDATIONS:  Initiate an oral diet of PUREE and MILDLY THICK/NECTAR THICK LIQUIDS  BY TEASPOON. No straws.  Total 1:1 feed assist.      *MBSS is a controlled study, pt fully upright in hausted chair, fed  at a controlled rate/pace.  Will suggest 1:1 feed with all PO intake  to ensure upright positioning and pacing, close monitoring for  safety.  Pt does self monitor and will provide feedback when he is  eating/drinking.      STRATEGIES:  -Fully  awake and engaged for PO intake, hold meal tray if pt is  drowsy or change in mentation or medical status. -Total feed assist  at a slow rate, allow extra time between bites/sips. -Meds crushed in  applesauce. -Present bolus to right side of mouth. Pt has left labial  weakness.      Education Provided: SLP has provided pt and caregiver/RN Collette  with the results and recommendations of this session.  Video images  of this study were reviewed with pt so that he might have a better  understanding of the oropharyngeal anatomy and swallow function.  Head of bed sign posted in room with diet level recommendations, and  safer swallow strategies to apply during PO intake, meals and meds.      Repeat study/ dc plan: Yes, in 2-3 weeks or sooner if needed, for  diet texture advancement.      Mechanics of the Swallow Summary:  ORAL PHASE:  Lip Closure - Escape progressing to mid-chin  x1 during tsp sips.  Left labial weakness. Tongue Control During Bolus Hold - did not test  Bolus prep/mastication - did not test  Bolus transport/lingual motion - Brisk tongue motion for A-P movement  of the bolus Oral residue - Trace residue lining oral structures      PHARYNGEAL PHASE:  Initiation of pharyngeal swallow - Bolus head at vallecular pit  Soft palate elevation - No bolus between soft palate/pharyngeal wall  Laryngeal elevation - Complete superior movement of thyroid cartilage  with contact of arytenoids to epiglottic petiole Anterior hyoid  excursion - Complete anterior movement Epiglottic movement - Partial  inversion Laryngeal vestibule closure - Incomplete - narrow column of  air/contrast in laryngeal vestibule Pharyngeal stripping wave -  Complete Pharyngeal contraction (A/P view) - Not tested  Pharyngoesophageal segment opening - Partial distension/partial  duration with partial obstruction of flow of bolus Tongue base  retraction - Trace column of contrast or air between tongue base and  pharyngeal wall Pharyngeal residue -  "Collection of residue within or  on the pharyngeal structures  noted pyriform residue after straw sips  only. Trace valleculae residue with all consistencies.      ESOPHAGEAL PHASE:  Esophageal clearance - Not evaluated      SLP Impressions with Severity Rating:  Mild oral and pharyngeal phases of the swallow, see impairments noted  above \"Mechanics of the Swallow\"      -Reduced BOT retraction, reduced epiglottic inversion, and incomplete  laryngeal vestibular closure result in trace amount of aspiration  with thin liquids. -There is no significant retained pharyngeal  residue after the swallow. -No aspiration with thick liquids  consistency, both nectar and honey. -No aspiration seen with puree  barium. -All consistencies were presented by tsp, with the exception  of x1 straw sip of nectar, which resulted in the bolus advancing to  the pyriforms prior to swallow onset, delay in swallow onset, with  eventual swallow, clearing the bolus from the pharynx, and deep  laryngeal penetration during the swallow that clears. Therefore, do  not use straws.      Strategies attempted- Effortful swallow, no difference in swallow  observed.      Rosenbek's Penetration Aspiration Scale  Thin Liquids: 8. SILENT ASPIRATION - contrast passes glottis, visible  residue, NO pt response , during the swallow, trace/silent, clears.  Nectar Thick Liquids: 2. PENETRATION that CLEARS - contrast enter  airway, above vocal cords, no residue Honey Thick Liquids: 2.  PENETRATION that CLEARS - contrast enter airway, above vocal cords,  no residue Puree: 1. NO ASPIRATION & NO PENETRATION - no  aspiration, contrast does not enter airway Soft Solids: did not test  for safety. Solids: did not test for safety.      Plan:  Treatment/Interventions: Pharyngeal exercises, Patient/family  education, Bolus trials, Compensatory strategy training, Diet  tolerance/advancement SLP Plan: Skilled SLP warranted  SLP Frequency: 2x per week  Discussed POC: " Patient  Discussed Risks/Benefits: Yes  Patient/Caregiver Agreeable: Yes      Short term goals established 24:  1. Patient will consume prescribed diet without clinical or overt s/s  aspiration.  2. Pt/caregiver education.      Reason for Referral: referral for further pharyngeal swallow testing  after bedside assessment. 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 Unable to get TNK due to  recent GI bleed and to high risk for thrombectomy Respiratory Status:  Room air Current diet: NPO      Pain:  Pain Scale: 0-10  Ratin during this session.      Speech Therapy section of this report signed by Kennedi Hanley MA  CCC-SLP On 2024 at 8:23 am.      RADIOLOGY FINDINGS:  I concur with the aforementioned findings.      Radiology section of this report signed by Michela CLARK.      Impression: 1. Please see above details regarding the stages of the study as well  as the speech therapist's notes and recommendations.      MACRO:  None      Signed by: Arun Abernathy 2024 10:42 AM  Dictation workstation:   [id number]      Physical Exam  Constitutional:       Appearance: Normal appearance.   HENT:      Head: Normocephalic.   Eyes:      Pupils: Pupils are equal, round, and reactive to light.   Cardiovascular:      Rate and Rhythm: Normal rate and regular rhythm.   Pulmonary:      Effort: Pulmonary effort is normal.      Breath sounds: Normal breath sounds.   Abdominal:      General: Abdomen is flat. Bowel sounds are normal.      Palpations: Abdomen is soft.   Skin:     General: Skin is warm and dry.      Capillary Refill: Capillary refill takes less than 2 seconds.   Neurological:      General: No focal deficit present.      Mental Status: He is alert and oriented to person, place, and time.   Psychiatric:         Mood and Affect: Mood normal.         Behavior: Behavior normal.         Relevant  Results               Assessment/Plan   Regulo Marques is a 83 year old Male with a past medical history of A-fib, colon cancer s/p colectomy, multiple small bowel obstructions, prostate cancer, unresectable rectal cancer s/p chemo and radiation therapy, diverticulitis, diabetes, hypertension, gout who presents to [facility] ED 9/2/2024 with strokelike symptoms.  Of note, [facility]12[facility] for with an upper GI bleed with hemoglobin of 6.1 requiring EGD and multiple transfusions.  Because of this he was taken off his Eliquis.  GI team was already consulted earlier this admission on 9/3/2024, and decision was made by GI team and neuro consult to restart Eliquis 5 to 7 days after stroke was confirmed.  GI team was reconsulted 9/16 due to patient's downtrending H/H after restarting Eliquis.     Acute CVA  Anemia, suspected recent upper GI bleed 8/12/2024  Hankins's esophagus  Gastritis with no focal ulcers  -Patient's Eliquis was restarted 9/12 and discontinued 9/16 due to downtrending H/H  -No signs or subjective symptoms of overt bleeding, suspect multifactorial contributions to anemia  -Patient ate today, currently on thickened liquid diet per SLP evaluation, will proceed with bowel prep through NG tube 9/17 and via joint decision making, plan for colonoscopy 9/18     Dr. Gurwinder Yeung   Internal Medicine PGY-1  Available on Rep for any questions.     This is a preliminary note pending signature from the attending physician.

## 2024-09-17 NOTE — CARE PLAN
Patient achieved 1/4 LTGs and 2/4 STGs established at initial evaluation.  Patient discharged to acute medical floor at this time.  Recommend continued PT when appropriate.       Problem: PT Problem  Goal: STG - Transfers with mod assist x2.  Outcome: Met  Goal: STG - Pt will amb 20 ft in a str line with approp device and mod assist x2.    Outcome: Met  Goal: LTG - Transfers with mod assist x1.  Outcome: Met     Problem: PT Problem  Goal: STG - Bed mobility with max assist x1.    Outcome: Not met  Goal: STG - Will determine most approp means of entry into home.    Outcome: Not met  Goal: LTG - Bed mobility with mod assist x1.  Outcome: Not met  Goal: LTG - Pt will amb 50 ft with approp device and mod assist x1.  Outcome: Not met  Goal: LTG - Will educate pt and family on most approp means of entry into home.    Outcome: Not met

## 2024-09-17 NOTE — NURSING NOTE
Spoke with Dr. Casarez via telephone 9/17 around 0030 in regards to patient needing n.o. as he was discharged from 4th floor rehab and transferred and admitted to 6th floor d/t gi consult for colonoscopy and possibly needing NG placement for prep. Rcvd n.o for CBC, BMP, AND CLEAR LIQUID DIET.

## 2024-09-17 NOTE — CARE PLAN
Mom notified.    OT DISCHARGE SUMMARY    Patient achieved 0/10 LTGs established at initial evaluation. Patient discharged unexpectedly to acute/medical unit secondary to GI issues.     Problem: OT Problem  Goal: STG - Patient will complete grooming with min assist.  9/17/2024 0812 by Alicia Tobin, OT  Outcome: Met  9/17/2024 0811 by Alicia Tobin OT  Reactivated  Goal: STG - Patient will complete sponge bathing with mod/max assist.  9/17/2024 0812 by Alicia Tobin, OT  Outcome: Met  9/17/2024 0811 by Alicia Tobin OT  Reactivated  Goal: STG - Patient will complete commode transfer via device as needed with min assist x2.  9/17/2024 0812 by Alicia Tobin, OT  Outcome: Met  9/17/2024 0811 by Alicia Tobin OT  Reactivated     Problem: OT Problem  Goal: LTG - Patient will complete eating with set up.  9/17/2024 0812 by Alicia Tobin OT  Outcome: Not met  9/17/2024 0811 by Alicia Tobin OT  Reactivated  Goal: LTG - Patient will complete toileting with mod assist.  9/17/2024 0812 by Alicia Tobin OT  Outcome: Not met  9/17/2024 0811 by Alicia Tobin OT  Reactivated  Goal: LTG - Patient will complete UE dressing with sba.  9/17/2024 0812 by Alicia Tobin, OT  Outcome: Not met  9/17/2024 0811 by Alicia Tobin OT  Reactivated  Goal: LTG - Patient will complete commode transfer via pivot or device as needed with mod assist.  9/17/2024 0812 by Alicia Tobin, OT  Outcome: Not met  9/17/2024 0811 by Alicia Tobin OT  Reactivated  Goal: LTG - Patient will complete simple mobility using device as needed with mod assist.  9/17/2024 0812 by Alicia Tobin, OT  Outcome: Not met  9/17/2024 0811 by Alicia Tobin, OT  Reactivated  Goal: STG - Patient will complete simple wheelchair mobility with min assist.  9/17/2024 0812 by Alicia Tobin, OT  Outcome: Not met  9/17/2024 0811 by Alicia Tobin OT  Reactivated  Goal: STG - Patient will complete UE dressing with min assist.  9/17/2024 0812 by  Alicia Tobin, OT  Outcome: Not met  9/17/2024 0811 by Alicia Tobin, OT  Reactivated  Goal: STG - Patient will complete LE dressing using adaptive equip as needed with min assist x2.  9/17/2024 0812 by Alicia Tobin, OT  Outcome: Not met  9/17/2024 0811 by Alicia Tobin, OT  Reactivated

## 2024-09-17 NOTE — PROGRESS NOTES
Called patient's wife--left VM with return number to call. Patient has NG--here with low HGB after being on Eliquis after recent CVA. Plan for colonoscopy tomorrow. Called into patient's room as well with no answer.     Cheryle Starks RN

## 2024-09-17 NOTE — PROGRESS NOTES
Speech-Language Pathology    Discharge Summary    Name: Regulo Marques  MRN: [id number]  : 1940  Date: 24    Discharge Summary: SLP    Discharge Information: Date of discharge 916    Therapy Summary: Patient discharged unexpectedly to acute care d/t GI consult needs.    COGNITIVE LTG: established 24              1. Patient will demonstrate improved cognition for return home at a min assist level of care; GOAL NOT MET D/T EARLY DISCHARGE  DYSPHAGIA LTG: estabished 24   Patient will tolerate the LRD without clinical s/s of dysphagia; GOAL NOT MET BUT PATIENT HAD BEEN PROGRESSING TO ENCISO FREE WATER PROTOCOL AND ADVANCED DIET CONSISTENCY AT TIME OF DISCHARGE.    Discharge Status: Acute care     Rehab Discharge Reason: Other Acute care discharge

## 2024-09-17 NOTE — CARE PLAN
The patient's goals for the shift include      The clinical goals for the shift include PT TO REMAIN STABLE AND  HDS    Over the shift, the patient did not make progress toward the following goals. Barriers to progression include N/A. Recommendations to address these barriers include N/A.

## 2024-09-18 PROBLEM — D64.9 ANEMIA, UNSPECIFIED TYPE: Status: ACTIVE | Noted: 2024-09-18

## 2024-09-18 LAB
ANION GAP SERPL CALC-SCNC: 12 MMOL/L (ref 10–20)
BASOPHILS # BLD AUTO: 0.03 X10*3/UL (ref 0–0.1)
BASOPHILS NFR BLD AUTO: 0.4 %
BUN SERPL-MCNC: 44 MG/DL (ref 6–23)
CALCIUM SERPL-MCNC: 9 MG/DL (ref 8.6–10.3)
CHLORIDE SERPL-SCNC: 108 MMOL/L (ref 98–107)
CO2 SERPL-SCNC: 23 MMOL/L (ref 21–32)
CREAT SERPL-MCNC: 1.38 MG/DL (ref 0.5–1.3)
EGFRCR SERPLBLD CKD-EPI 2021: 50 ML/MIN/1.73M*2
EOSINOPHIL # BLD AUTO: 0.19 X10*3/UL (ref 0–0.4)
EOSINOPHIL NFR BLD AUTO: 2.4 %
ERYTHROCYTE [DISTWIDTH] IN BLOOD BY AUTOMATED COUNT: 16.5 % (ref 11.5–14.5)
GLUCOSE BLD MANUAL STRIP-MCNC: 101 MG/DL (ref 74–99)
GLUCOSE BLD MANUAL STRIP-MCNC: 107 MG/DL (ref 74–99)
GLUCOSE BLD MANUAL STRIP-MCNC: 111 MG/DL (ref 74–99)
GLUCOSE BLD MANUAL STRIP-MCNC: 115 MG/DL (ref 74–99)
GLUCOSE SERPL-MCNC: 94 MG/DL (ref 74–99)
HCT VFR BLD AUTO: 25.2 % (ref 41–52)
HGB BLD-MCNC: 7.8 G/DL (ref 13.5–17.5)
IMM GRANULOCYTES # BLD AUTO: 0.04 X10*3/UL (ref 0–0.5)
IMM GRANULOCYTES NFR BLD AUTO: 0.5 % (ref 0–0.9)
LYMPHOCYTES # BLD AUTO: 0.93 X10*3/UL (ref 0.8–3)
LYMPHOCYTES NFR BLD AUTO: 11.8 %
MCH RBC QN AUTO: 30.6 PG (ref 26–34)
MCHC RBC AUTO-ENTMCNC: 31 G/DL (ref 32–36)
MCV RBC AUTO: 99 FL (ref 80–100)
MONOCYTES # BLD AUTO: 0.69 X10*3/UL (ref 0.05–0.8)
MONOCYTES NFR BLD AUTO: 8.7 %
NEUTROPHILS # BLD AUTO: 6.03 X10*3/UL (ref 1.6–5.5)
NEUTROPHILS NFR BLD AUTO: 76.2 %
NRBC BLD-RTO: 0 /100 WBCS (ref 0–0)
PLATELET # BLD AUTO: 218 X10*3/UL (ref 150–450)
POTASSIUM SERPL-SCNC: 5.1 MMOL/L (ref 3.5–5.3)
RBC # BLD AUTO: 2.55 X10*6/UL (ref 4.5–5.9)
SODIUM SERPL-SCNC: 138 MMOL/L (ref 136–145)
WBC # BLD AUTO: 7.9 X10*3/UL (ref 4.4–11.3)

## 2024-09-18 PROCEDURE — 1100000001 HC PRIVATE ROOM DAILY

## 2024-09-18 PROCEDURE — 2500000005 HC RX 250 GENERAL PHARMACY W/O HCPCS

## 2024-09-18 PROCEDURE — 2500000001 HC RX 250 WO HCPCS SELF ADMINISTERED DRUGS (ALT 637 FOR MEDICARE OP): Performed by: INTERNAL MEDICINE

## 2024-09-18 PROCEDURE — 36415 COLL VENOUS BLD VENIPUNCTURE: CPT | Performed by: INTERNAL MEDICINE

## 2024-09-18 PROCEDURE — 99232 SBSQ HOSP IP/OBS MODERATE 35: CPT

## 2024-09-18 PROCEDURE — 85025 COMPLETE CBC W/AUTO DIFF WBC: CPT | Performed by: INTERNAL MEDICINE

## 2024-09-18 PROCEDURE — 82947 ASSAY GLUCOSE BLOOD QUANT: CPT

## 2024-09-18 PROCEDURE — 80048 BASIC METABOLIC PNL TOTAL CA: CPT | Performed by: INTERNAL MEDICINE

## 2024-09-18 PROCEDURE — 2500000001 HC RX 250 WO HCPCS SELF ADMINISTERED DRUGS (ALT 637 FOR MEDICARE OP)

## 2024-09-18 PROCEDURE — 2500000002 HC RX 250 W HCPCS SELF ADMINISTERED DRUGS (ALT 637 FOR MEDICARE OP, ALT 636 FOR OP/ED): Performed by: INTERNAL MEDICINE

## 2024-09-18 PROCEDURE — 2500000001 HC RX 250 WO HCPCS SELF ADMINISTERED DRUGS (ALT 637 FOR MEDICARE OP): Performed by: NURSE PRACTITIONER

## 2024-09-18 RX ORDER — POLYETHYLENE GLYCOL 3350, SODIUM CHLORIDE, SODIUM BICARBONATE, POTASSIUM CHLORIDE 420; 11.2; 5.72; 1.48 G/4L; G/4L; G/4L; G/4L
2000 POWDER, FOR SOLUTION ORAL ONCE
Status: COMPLETED | OUTPATIENT
Start: 2024-09-18 | End: 2024-09-18

## 2024-09-18 RX ORDER — POLYETHYLENE GLYCOL 3350, SODIUM CHLORIDE, SODIUM BICARBONATE, POTASSIUM CHLORIDE 420; 11.2; 5.72; 1.48 G/4L; G/4L; G/4L; G/4L
4000 POWDER, FOR SOLUTION ORAL ONCE
Status: COMPLETED | OUTPATIENT
Start: 2024-09-18 | End: 2024-09-18

## 2024-09-18 RX ORDER — POLYETHYLENE GLYCOL 3350, SODIUM CHLORIDE, SODIUM BICARBONATE, POTASSIUM CHLORIDE 420; 11.2; 5.72; 1.48 G/4L; G/4L; G/4L; G/4L
2000 POWDER, FOR SOLUTION ORAL ONCE AS NEEDED
Status: DISCONTINUED | OUTPATIENT
Start: 2024-09-18 | End: 2024-09-18

## 2024-09-18 ASSESSMENT — COGNITIVE AND FUNCTIONAL STATUS - GENERAL
DAILY ACTIVITIY SCORE: 12
CLIMB 3 TO 5 STEPS WITH RAILING: A LOT
WALKING IN HOSPITAL ROOM: A LOT
TOILETING: A LOT
TURNING FROM BACK TO SIDE WHILE IN FLAT BAD: A LOT
PERSONAL GROOMING: A LOT
STANDING UP FROM CHAIR USING ARMS: A LOT
MOBILITY SCORE: 12
DRESSING REGULAR LOWER BODY CLOTHING: A LOT
HELP NEEDED FOR BATHING: A LOT
MOVING FROM LYING ON BACK TO SITTING ON SIDE OF FLAT BED WITH BEDRAILS: A LOT
EATING MEALS: A LOT
DRESSING REGULAR UPPER BODY CLOTHING: A LOT
MOVING TO AND FROM BED TO CHAIR: A LOT

## 2024-09-18 ASSESSMENT — PAIN SCALES - GENERAL
PAINLEVEL_OUTOF10: 0 - NO PAIN

## 2024-09-18 ASSESSMENT — ACTIVITIES OF DAILY LIVING (ADL): LACK_OF_TRANSPORTATION: NO

## 2024-09-18 ASSESSMENT — PAIN - FUNCTIONAL ASSESSMENT: PAIN_FUNCTIONAL_ASSESSMENT: 0-10

## 2024-09-18 ASSESSMENT — PAIN SCALES - WONG BAKER: WONGBAKER_NUMERICALRESPONSE: NO HURT

## 2024-09-18 NOTE — PROGRESS NOTES
Regulo Marques is a 83 y.o. male on day 2 of admission presenting with No Principal Problem: There is no principal problem currently on the Problem List. Please update the Problem List and refresh..      Subjective   Patient was seen and examined this morning.  No acute events overnight.    Objective     Last Recorded Vitals  /62 (BP Location: Right arm, Patient Position: Lying)   Pulse 56   Temp 36.3 °C (97.3 °F) (Temporal)   Resp 18   SpO2 95%   Intake/Output last 3 Shifts:    Intake/Output Summary (Last 24 hours) at 9/18/2024 0943  Last data filed at 9/17/2024 1446  Gross per 24 hour   Intake --   Output 700 ml   Net -700 ml       Admission Weight       Daily Weight  09/09/24 : 93 kg (205 lb 0.4 oz)    Image Results  XR abdomen 1 view  Narrative: Interpreted By:  Gomez Rosenthal,   STUDY:  XR ABDOMEN 1 VIEW;  9/17/2024 6:55 pm      INDICATION:  Signs/Symptoms:NG placement.          COMPARISON:  None.      ACCESSION NUMBER(S):  [id number]      ORDERING CLINICIAN:  JESSICA CANALES      FINDINGS:  NG/OG tube terminates over the gastric body. The right upper quadrant  cholecystectomy clips.      There are no dilated loops of large or small bowel.      There is a mild colonic stool burden.      There is no evidence of free intraperitoneal air.      No pneumatosis or portal venous gas identified.      No acute osseous abnormalities.      There is dense airspace disease and pleural effusion at the left lung  base.      Impression: Large area of dense airspace disease at the left lung base concerning  for pneumonia/aspiration with pleural effusion.      - NG/OG tube termination: Gastric body.      MACRO:  None.      Signed by: Gomez Rosenthal 9/17/2024 7:09 PM  Dictation workstation:   [id number]  XR abdomen 1 view  Narrative: Interpreted By:  Melody Kaplan,   STUDY:  XR ABDOMEN 1 VIEW;  9/17/2024 5:45 pm      INDICATION:  Signs/Symptoms:NG tube placement.          COMPARISON:  05/24/2021       ACCESSION NUMBER(S):  [id number]      ORDERING CLINICIAN:  JESSICA CANALES      FINDINGS:  Enteric tube tip projects over the stomach, 1st side hole over the GE  junction. Consider appropriate repositioning. Visualized upper  abdominal bowel gas pattern nonobstructive. Left lower lobe opacities  and probably an effusion, findings which would be better assessed  with dedicated chest radiograph as warranted.      Impression: Enteric tube tip projects over the stomach, 1st side hole over the GE  junction. Consider appropriate repositioning. Orange alert.      Left-sided pulmonary opacities with probable effusion, would be  better assessed on two-view chest radiograph.      Critical Finding:  See findings. Notification was initiated on  9/17/2024 at 6:23 pm by  Melody Kaplan.  (**-OCF-**) Instructions:                  MACRO:  None      Signed by: Melody Kaplan 9/17/2024 6:23 PM  Dictation workstation:   [id number]      Physical Exam  Constitutional:       Appearance: Normal appearance.   HENT:      Head: Normocephalic.   Eyes:      Pupils: Pupils are equal, round, and reactive to light.   Cardiovascular:      Rate and Rhythm: Normal rate and regular rhythm.   Pulmonary:      Effort: Pulmonary effort is normal.      Breath sounds: Normal breath sounds.   Abdominal:      General: Abdomen is flat. Bowel sounds are normal.      Palpations: Abdomen is soft.   Skin:     General: Skin is warm and dry.      Capillary Refill: Capillary refill takes less than 2 seconds.   Neurological:      General: No focal deficit present.      Mental Status: He is alert and oriented to person, place, and time.   Psychiatric:         Mood and Affect: Mood normal.         Behavior: Behavior normal.         Relevant Results               Assessment/Plan   Regulo Marques is a 83 year old Male with a past medical history of A-fib, colon cancer s/p colectomy, multiple small bowel obstructions, prostate cancer, unresectable rectal cancer  s/p chemo and radiation therapy, diverticulitis, diabetes, hypertension, gout who presents to [facility] ED 9/2/2024 with strokelike symptoms.  Of note, he was [facility] [place] [facility] 8/12/2024 for with an upper GI bleed with hemoglobin of 6.1 requiring EGD and multiple transfusions.  Because of this he was taken off his Eliquis.  GI team was already consulted earlier this admission on 9/3/2024, and decision was made by GI team and neuro consult to restart Eliquis 5 to 7 days after stroke was confirmed.  GI team was reconsulted 9/16 due to patient's downtrending H/H after restarting Eliquis.     Acute CVA  Anemia, suspected recent upper GI bleed 8/12/2024  Hankins's esophagus  Gastritis with no focal ulcers  -Patient's Eliquis was restarted 9/12 and discontinued 9/16 due to downtrending H/H  -Patient's hemoglobin appears to be stable, maintain hemoglobin over 7.2  -No signs or subjective symptoms of overt bleeding, suspect multifactorial contributions to anemia  -Pending colonoscopy 9/19, continue with bowel prep and n.p.o. midnight tonight     Dr. Gurwinder Yeung   Internal Medicine PGY-1  Available on [employer] for any questions.     This is a preliminary note pending signature from the attending physician.

## 2024-09-18 NOTE — PROGRESS NOTES
Physical Therapy                 Therapy Communication Note    Patient Name: Regulo Marques  MRN: [id number]  Department: [facility] 6  Room: [address] [place] [address]  Today's Date: 9/18/2024     Discipline: Physical Therapy     Missed Visit Reason:  (Chart reviewed and case conference with RN when attempted initial PT eval at 10:35 am. Patient on hold since participating in colonoscopy prep; procedure scheduled for this date.)

## 2024-09-18 NOTE — PROGRESS NOTES
Seen/exam/meds/labs/notes reviewed  No new issues/complaints  Hgb 7s  No overt gib  Plan for cscope tomorrow  Mult family at bedside; all ?s answered  A/p; cont present meds/mgmt; cscope tomorrow; if stable = dc back to rehab v other after      Assessment & Plan  Anemia, unspecified type           Quality 431: Preventive Care And Screening: Unhealthy Alcohol Use - Screening: Patient not identified as an unhealthy alcohol user when screened for unhealthy alcohol use using a systematic screening method Quality 226: Preventive Care And Screening: Tobacco Use: Screening And Cessation Intervention: Patient screened for tobacco use and is an ex/non-smoker Detail Level: Detailed Quality 130: Documentation Of Current Medications In The Medical Record: Current Medications Documented

## 2024-09-18 NOTE — PROGRESS NOTES
Occupational Therapy                 Therapy Communication Note    Patient Name: Regulo Marques  MRN: [id number]  Department: [facility] 6  Room: [facility]63-  Today's Date: 9/18/2024     Discipline: Occupational Therapy    Missed Visit Reason:  Chart reviewed and case conference with RN/Tyron when attempted initial OT eval at 10:34 am.  Patient hold since participating in colonoscopy prep; procedure scheduled for this date.    Missed Time: Attempt

## 2024-09-18 NOTE — CARE PLAN
The patient's goals for the shift include      Problem: Safety - Adult  Goal: Free from fall injury  Outcome: Progressing  Flowsheets (Taken 9/18/2024 1610)  Free from fall injury: Instruct family/caregiver on patient safety     Problem: Skin  Goal: Prevent/minimize sheer/friction injuries  Outcome: Progressing  Flowsheets (Taken 9/18/2024 1610)  Prevent/minimize sheer/friction injuries:   HOB 30 degrees or less   Turn/reposition every 2 hours/use positioning/transfer devices     The clinical goals for the shift include Pt will remain hemodynamically stable throughout shift

## 2024-09-18 NOTE — PROGRESS NOTES
09/18/24 1544   Discharge Planning   Living Arrangements Spouse/significant other   Support Systems Spouse/significant other;Children   Type of Residence Private residence   Expected Discharge Disposition Rehab     Patient presented from [facility] Acute Rehab floor where he was receiving therapies after suffering CVA with residual left sided weakness.  Patient going for colonoscopy tomorrow and will work with PT/OT for anticipated return to [facility].  Lurdes GARCIA TCC

## 2024-09-18 NOTE — CONSULTS
Nutrition Initial Assessment:   Nutrition Assessment    Reason for Assessment: Admission nursing screening    Patient is a 83 y.o. male who discharged from rehab to return to acute care for colonoscopy.       Nutrition History:  Energy Intake: Fair 50-75 %  Food and Nutrient History: Pt was just in at rehab, transferred back to acute care for GI concerns. States he was eating well at rehab, although meal intake records average 53%. Was getting ONS while in rehab - will order magic cup for when diet is advanced. Pt states he feels sick from colonoscopy prep but no vomiting. Does not like any vegetables. Does not lke oranges. On thickened liquids.  Vitamin/Herbal Supplement Use: Preservision AREDS per home med list  Food Allergies/Intolerances:  None  GI Symptoms: Nausea  Oral Problems: Swallowing difficulty       Anthropometrics:             IBW/kg (Dietitian Calculated): 72.7 kg          Weight History:   Wt Readings from Last 10 Encounters:   09/09/24 93 kg (205 lb 0.4 oz)   09/04/24 93.2 kg (205 lb 7.5 oz)      Weight Change %:  Weight History / % Weight Change: No new wt to assess this admission. Unable to determine recent wt changes.    Nutrition Focused Physical Exam Findings:    Subcutaneous Fat Loss:   Orbital Fat Pads: Mild-Moderate (slight dark circles and slight hollowing)  Buccal Fat Pads: Mild-Moderate (flat cheeks, minimal bounce)  Triceps: Mild-Moderate (less than ample fat tissue)  Muscle Wasting:  Temporalis: Mild-Moderate (slight depression)  Pectoralis (Clavicular Region): Mild-Moderate (some protrusion of clavicle)  Deltoid/Trapezius: Mild-Moderate (slight protrusion of acromion process)  Edema:  Edema: +2 mild  Edema Location: BLE per nursing flowsheets  Physical Findings:  Mouth: Negative  Nails: Negative  Skin: Negative    Nutrition Significant Labs:    Reviewed   Nutrition Specific Medications:  Reviewed     I/O:   Last BM Date: 09/17/24; Stool Appearance: Watery (09/18/24 0130)    Dietary  Orders (From admission, onward)       Start     Ordered    09/18/24 1020  Oral nutritional supplements  Until discontinued        Comments: Chocolate  When diet is advanced   Question Answer Comment   Deliver with All meals    Select supplement: Magic Cup        09/18/24 1019    09/17/24 0030  Adult diet Clear Liquid; Moderately thick 3  Diet effective now        Question Answer Comment   Diet type Clear Liquid    Fluid consistency Moderately thick 3        09/17/24 0031                     Estimated Needs:   Total Energy Estimated Needs (kCal): 1818 kCal  Method for Estimating Needs: 25 kcal/kg IBW  Total Protein Estimated Needs (g): 73 g  Method for Estimating Needs: 1 g/kg IBW  Total Fluid Estimated Needs (mL): 1818 mL  Method for Estimating Needs: 1 ml/kcal or per MD        Nutrition Diagnosis   Malnutrition Diagnosis  Patient has Malnutrition Diagnosis: Yes  Diagnosis Status: New  Malnutrition Diagnosis: Moderate malnutrition related to acute disease or injury  As Evidenced by: mild-moderate fat wasting; mild-moderate muscle wasting; average intake of 53% of meals x >1 week            Nutrition Interventions/Recommendations         Nutrition Prescription:  Individualized Nutrition Prescription Provided for : Diet advancement as medically appropriate; ONS with meals once diet is advanced        Nutrition Interventions:   Interventions: Meals and snacks, Medical food supplement  Meals and Snacks: Texture-modified diet, Fluid-modified diet  Goal: Consumes 3 meals per day  Medical Food Supplement: Commercial beverage  Goal: Magic cup TID (290 kcal, 9 g protein per serving).         Nutrition Education:   Patient with no diet related questions at this time.         Nutrition Monitoring and Evaluation   Food/Nutrient Related History Monitoring  Monitoring and Evaluation Plan: Energy intake, Amount of food, Fluid intake  Energy Intake: Estimated energy intake  Criteria: Pt meets >75% of estimated energy needs  Fluid  Intake: Estimated fluid intake  Criteria: Meets >75% of estimated fluid needs  Amount of Food: Estimated amout of food, Medical food intake  Criteria: Pt consumes >75% of meals and supplements    Body Composition/Growth/Weight History  Monitoring and Evaluation Plan: Weight  Weight: Measured weight  Criteria: Maintains stable weight    Biochemical Data, Medical Tests and Procedures  Monitoring and Evaluation Plan: Glucose/endocrine profile, Electrolyte/renal panel  Electrolyte and Renal Panel: Sodium, Potassium, Phosphorus  Criteria: Electrolytes WNL  Glucose/Endocrine Profile: Glucose, casual  Criteria: BG within desirable range    Nutrition Focused Physical Findings  Monitoring and Evaluation Plan: Digestive System  Digestive System: Nausea  Criteria: Improvement in GI function/symptoms         Time Spent (min): 30 minutes

## 2024-09-19 ENCOUNTER — APPOINTMENT (OUTPATIENT)
Dept: GASTROENTEROLOGY | Facility: HOSPITAL | Age: 84
End: 2024-09-19
Payer: MEDICARE

## 2024-09-19 ENCOUNTER — ANESTHESIA EVENT (OUTPATIENT)
Dept: GASTROENTEROLOGY | Facility: HOSPITAL | Age: 84
End: 2024-09-19
Payer: MEDICARE

## 2024-09-19 ENCOUNTER — ANESTHESIA (OUTPATIENT)
Dept: GASTROENTEROLOGY | Facility: HOSPITAL | Age: 84
End: 2024-09-19
Payer: MEDICARE

## 2024-09-19 LAB
ANION GAP SERPL CALC-SCNC: 13 MMOL/L (ref 10–20)
BASOPHILS # BLD AUTO: 0.03 X10*3/UL (ref 0–0.1)
BASOPHILS NFR BLD AUTO: 0.3 %
BLOOD EXPIRATION DATE: NORMAL
BLOOD EXPIRATION DATE: NORMAL
BUN SERPL-MCNC: 39 MG/DL (ref 6–23)
CALCIUM SERPL-MCNC: 8.7 MG/DL (ref 8.6–10.3)
CHLORIDE SERPL-SCNC: 109 MMOL/L (ref 98–107)
CO2 SERPL-SCNC: 23 MMOL/L (ref 21–32)
CREAT SERPL-MCNC: 1.48 MG/DL (ref 0.5–1.3)
DISPENSE STATUS: NORMAL
DISPENSE STATUS: NORMAL
EGFRCR SERPLBLD CKD-EPI 2021: 46 ML/MIN/1.73M*2
EOSINOPHIL # BLD AUTO: 0.12 X10*3/UL (ref 0–0.4)
EOSINOPHIL NFR BLD AUTO: 1.2 %
ERYTHROCYTE [DISTWIDTH] IN BLOOD BY AUTOMATED COUNT: 16.5 % (ref 11.5–14.5)
GLUCOSE BLD MANUAL STRIP-MCNC: 109 MG/DL (ref 74–99)
GLUCOSE BLD MANUAL STRIP-MCNC: 141 MG/DL (ref 74–99)
GLUCOSE BLD MANUAL STRIP-MCNC: 96 MG/DL (ref 74–99)
GLUCOSE BLD MANUAL STRIP-MCNC: 98 MG/DL (ref 74–99)
GLUCOSE SERPL-MCNC: 102 MG/DL (ref 74–99)
HCT VFR BLD AUTO: 24.5 % (ref 41–52)
HGB BLD-MCNC: 7.6 G/DL (ref 13.5–17.5)
IMM GRANULOCYTES # BLD AUTO: 0.05 X10*3/UL (ref 0–0.5)
IMM GRANULOCYTES NFR BLD AUTO: 0.5 % (ref 0–0.9)
LYMPHOCYTES # BLD AUTO: 0.78 X10*3/UL (ref 0.8–3)
LYMPHOCYTES NFR BLD AUTO: 7.8 %
MCH RBC QN AUTO: 30.5 PG (ref 26–34)
MCHC RBC AUTO-ENTMCNC: 31 G/DL (ref 32–36)
MCV RBC AUTO: 98 FL (ref 80–100)
MONOCYTES # BLD AUTO: 0.68 X10*3/UL (ref 0.05–0.8)
MONOCYTES NFR BLD AUTO: 6.8 %
NEUTROPHILS # BLD AUTO: 8.33 X10*3/UL (ref 1.6–5.5)
NEUTROPHILS NFR BLD AUTO: 83.4 %
NRBC BLD-RTO: 0 /100 WBCS (ref 0–0)
PLATELET # BLD AUTO: 227 X10*3/UL (ref 150–450)
POTASSIUM SERPL-SCNC: 4.5 MMOL/L (ref 3.5–5.3)
PRODUCT BLOOD TYPE: 9500
PRODUCT BLOOD TYPE: 9500
PRODUCT CODE: NORMAL
PRODUCT CODE: NORMAL
RBC # BLD AUTO: 2.49 X10*6/UL (ref 4.5–5.9)
SODIUM SERPL-SCNC: 140 MMOL/L (ref 136–145)
UNIT ABO: NORMAL
UNIT ABO: NORMAL
UNIT NUMBER: NORMAL
UNIT NUMBER: NORMAL
UNIT RH: NORMAL
UNIT RH: NORMAL
UNIT VOLUME: 278
UNIT VOLUME: 283
WBC # BLD AUTO: 10 X10*3/UL (ref 4.4–11.3)
XM INTEP: NORMAL
XM INTEP: NORMAL

## 2024-09-19 PROCEDURE — 1100000001 HC PRIVATE ROOM DAILY

## 2024-09-19 PROCEDURE — 80048 BASIC METABOLIC PNL TOTAL CA: CPT | Performed by: INTERNAL MEDICINE

## 2024-09-19 PROCEDURE — 97166 OT EVAL MOD COMPLEX 45 MIN: CPT | Mod: GO

## 2024-09-19 PROCEDURE — 88305 TISSUE EXAM BY PATHOLOGIST: CPT | Performed by: PATHOLOGY

## 2024-09-19 PROCEDURE — 2720000007 HC OR 272 NO HCPCS

## 2024-09-19 PROCEDURE — 91110 GI TRC IMG INTRAL ESOPH-ILE: CPT

## 2024-09-19 PROCEDURE — 2500000005 HC RX 250 GENERAL PHARMACY W/O HCPCS: Performed by: ANESTHESIOLOGIST ASSISTANT

## 2024-09-19 PROCEDURE — 0DBK8ZZ EXCISION OF ASCENDING COLON, VIA NATURAL OR ARTIFICIAL OPENING ENDOSCOPIC: ICD-10-PCS | Performed by: STUDENT IN AN ORGANIZED HEALTH CARE EDUCATION/TRAINING PROGRAM

## 2024-09-19 PROCEDURE — 99232 SBSQ HOSP IP/OBS MODERATE 35: CPT

## 2024-09-19 PROCEDURE — 82947 ASSAY GLUCOSE BLOOD QUANT: CPT

## 2024-09-19 PROCEDURE — 85025 COMPLETE CBC W/AUTO DIFF WBC: CPT | Performed by: INTERNAL MEDICINE

## 2024-09-19 PROCEDURE — 7100000001 HC RECOVERY ROOM TIME - INITIAL BASE CHARGE

## 2024-09-19 PROCEDURE — 2500000004 HC RX 250 GENERAL PHARMACY W/ HCPCS (ALT 636 FOR OP/ED): Performed by: ANESTHESIOLOGIST ASSISTANT

## 2024-09-19 PROCEDURE — 88305 TISSUE EXAM BY PATHOLOGIST: CPT | Mod: TC,PARLAB | Performed by: STUDENT IN AN ORGANIZED HEALTH CARE EDUCATION/TRAINING PROGRAM

## 2024-09-19 PROCEDURE — 45390 COLONOSCOPY W/RESECTION: CPT | Performed by: STUDENT IN AN ORGANIZED HEALTH CARE EDUCATION/TRAINING PROGRAM

## 2024-09-19 PROCEDURE — 2500000002 HC RX 250 W HCPCS SELF ADMINISTERED DRUGS (ALT 637 FOR MEDICARE OP, ALT 636 FOR OP/ED): Performed by: INTERNAL MEDICINE

## 2024-09-19 PROCEDURE — 3700000002 HC GENERAL ANESTHESIA TIME - EACH INCREMENTAL 1 MINUTE

## 2024-09-19 PROCEDURE — 97161 PT EVAL LOW COMPLEX 20 MIN: CPT | Mod: GP

## 2024-09-19 PROCEDURE — 7100000002 HC RECOVERY ROOM TIME - EACH INCREMENTAL 1 MINUTE

## 2024-09-19 PROCEDURE — 3700000001 HC GENERAL ANESTHESIA TIME - INITIAL BASE CHARGE

## 2024-09-19 PROCEDURE — 2500000001 HC RX 250 WO HCPCS SELF ADMINISTERED DRUGS (ALT 637 FOR MEDICARE OP): Performed by: INTERNAL MEDICINE

## 2024-09-19 PROCEDURE — 36415 COLL VENOUS BLD VENIPUNCTURE: CPT | Performed by: INTERNAL MEDICINE

## 2024-09-19 RX ORDER — PROPOFOL 10 MG/ML
INJECTION, EMULSION INTRAVENOUS AS NEEDED
Status: DISCONTINUED | OUTPATIENT
Start: 2024-09-19 | End: 2024-09-19

## 2024-09-19 RX ORDER — PHENYLEPHRINE HCL IN 0.9% NACL 1 MG/10 ML
SYRINGE (ML) INTRAVENOUS AS NEEDED
Status: DISCONTINUED | OUTPATIENT
Start: 2024-09-19 | End: 2024-09-19

## 2024-09-19 RX ORDER — GLYCOPYRROLATE 0.2 MG/ML
INJECTION INTRAMUSCULAR; INTRAVENOUS AS NEEDED
Status: DISCONTINUED | OUTPATIENT
Start: 2024-09-19 | End: 2024-09-19

## 2024-09-19 RX ORDER — LIDOCAINE HCL/PF 100 MG/5ML
SYRINGE (ML) INTRAVENOUS AS NEEDED
Status: DISCONTINUED | OUTPATIENT
Start: 2024-09-19 | End: 2024-09-19

## 2024-09-19 SDOH — HEALTH STABILITY: MENTAL HEALTH: CURRENT SMOKER: 0

## 2024-09-19 ASSESSMENT — COGNITIVE AND FUNCTIONAL STATUS - GENERAL
MOVING TO AND FROM BED TO CHAIR: A LOT
HELP NEEDED FOR BATHING: A LOT
STANDING UP FROM CHAIR USING ARMS: A LITTLE
MOBILITY SCORE: 13
DRESSING REGULAR LOWER BODY CLOTHING: TOTAL
DRESSING REGULAR UPPER BODY CLOTHING: A LOT
STANDING UP FROM CHAIR USING ARMS: A LOT
DRESSING REGULAR UPPER BODY CLOTHING: A LOT
MOVING FROM LYING ON BACK TO SITTING ON SIDE OF FLAT BED WITH BEDRAILS: A LITTLE
HELP NEEDED FOR BATHING: A LOT
EATING MEALS: A LOT
TOILETING: TOTAL
TURNING FROM BACK TO SIDE WHILE IN FLAT BAD: A LOT
PERSONAL GROOMING: A LOT
MOVING FROM LYING ON BACK TO SITTING ON SIDE OF FLAT BED WITH BEDRAILS: A LITTLE
TURNING FROM BACK TO SIDE WHILE IN FLAT BAD: A LOT
CLIMB 3 TO 5 STEPS WITH RAILING: A LOT
WALKING IN HOSPITAL ROOM: A LOT
PERSONAL GROOMING: A LOT
MOVING TO AND FROM BED TO CHAIR: A LITTLE
DAILY ACTIVITIY SCORE: 11
CLIMB 3 TO 5 STEPS WITH RAILING: TOTAL
EATING MEALS: A LITTLE
STANDING UP FROM CHAIR USING ARMS: A LOT
DRESSING REGULAR LOWER BODY CLOTHING: A LOT
TURNING FROM BACK TO SIDE WHILE IN FLAT BAD: A LITTLE
DAILY ACTIVITIY SCORE: 12
TOILETING: A LOT
MOBILITY SCORE: 13
WALKING IN HOSPITAL ROOM: A LOT
MOVING FROM LYING ON BACK TO SITTING ON SIDE OF FLAT BED WITH BEDRAILS: A LITTLE
MOVING TO AND FROM BED TO CHAIR: A LOT
MOBILITY SCORE: 15
CLIMB 3 TO 5 STEPS WITH RAILING: A LOT
WALKING IN HOSPITAL ROOM: A LOT

## 2024-09-19 ASSESSMENT — PAIN - FUNCTIONAL ASSESSMENT
PAIN_FUNCTIONAL_ASSESSMENT: 0-10

## 2024-09-19 ASSESSMENT — PAIN SCALES - GENERAL
PAINLEVEL_OUTOF10: 0 - NO PAIN

## 2024-09-19 NOTE — PROGRESS NOTES
Physical Therapy    Physical Therapy Evaluation    Patient Name: Regulo Maruqes  MRN: [id number]  Today's Date: 9/19/2024   Time Calculation  Start Time: 1412  Stop Time: 1437  Time Calculation (min): 25 min  635/635-A    Assessment/Plan   PT Assessment  PT Assessment Results: Decreased strength, Decreased endurance, Impaired balance, Decreased mobility, Decreased coordination, Decreased safety awareness, Impaired hearing  Rehab Prognosis: Fair  Evaluation/Treatment Tolerance: Patient tolerated treatment well  Medical Staff Made Aware: Yes  Strengths: Ability to acquire knowledge  End of Session Communication: Bedside nurse  Assessment Comment: pt tolerated session. pt presented with decreased functional mobility, strength, endurance, balance, and safety awareness. pt would benefit from mod int therapy to improve impairments.  End of Session Patient Position: Up in chair, Alarm on (call light in reach)  IP OR SWING BED PT PLAN  Inpatient or Swing Bed: Inpatient  PT Plan  Treatment/Interventions: Bed mobility, Transfer training, Gait training, Balance training, Strengthening, Endurance training, Range of motion, Therapeutic exercise, Therapeutic activity  PT Plan: Ongoing PT  PT Frequency: 4 times per week  PT Discharge Recommendations: Moderate intensity level of continued care  PT Recommended Transfer Status: Assist x1  PT - OK to Discharge: Yes (once medically cleared)    Subjective     Current Problem:  1. 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    Anemia    Chronic constipation    Chronic diastolic heart failure (Multi)    Diabetes mellitus (Multi)    Essential hypertension, benign    Diverticulitis    Gross hematuria    History of  malignant neoplasm of colon    Longstanding persistent atrial fibrillation (Multi)    Obesity (BMI 30-39.9)    CVA (cerebrovascular accident due to intracerebral hemorrhage) (Multi)    Atrial fibrillation (Multi)    Urinary retention    Anemia, unspecified type       General Visit Information:  General  Reason for Referral: PT EVAL AND TREAT  Referred By: SARAH Odonnell MD  Past Medical History Relevant to Rehab: A-fib, CHF, DM, HTN, diverticulitis, THR, right carpal tunnel, recent R CVA (9/2)    Co-Treatment: OT  Co-Treatment Reason: maximize safety and functional mobility  Prior to Session Communication: Bedside nurse  Patient Position Received: Bed, 2 rail up, Alarm on (son and wife present)    Home Living:  Home Living  Home Living Comments: independent living facility with wife. 1st floor set up. pt has WIS with GB's, and chair. pt has standard toilet with GB's. laundry on first floor.    Prior Level of Function:  Prior Function Per Pt/Caregiver Report  Prior Function Comments: has house keeper for cleaning, wife completes laundry, pt uses w/w for amb. Pt IND in ALD/IADLs. did most of IADLs for spouse. eats most meals at resturants and facility supplies meals.    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 BLE strength 3/5 grossly. BLE ROM WFL.       Functional Assessments:     Bed Mobility  Bed Mobility:  (SBA for sup <> sit)    Transfers  Transfer:  (CGA to STS from bed; v/c for hand placement and sequencing)    Ambulation/Gait Training  Ambulation/Gait Training Performed:  (amb of 4 steps from bed  to chair using w/w and CGA.)        Outcome Measures:     [facility] Basic Mobility  Turning from your back to your side while in a flat bed without using bedrails: A little  Moving from lying on your back to sitting on the side of a flat bed without using bedrails: A little  Moving to and from bed to chair (including a wheelchair): A little  Standing up from a chair using your arms (e.g. wheelchair or bedside chair): A little  To walk in hospital room: A lot  Climbing 3-5 steps with railing: Total  Basic Mobility - Total Score: 15          Goals:  Encounter Problems       Encounter Problems (Active)       PT Problem       Pt will perform a B LE ther ex program of 2-3 sets of 10  (Progressing)       Start:  09/19/24    Expected End:  10/03/24            Pt will amb 180' using w/w with SBA  (Progressing)       Start:  09/19/24    Expected End:  10/03/24            Pt will transfer STS IND  (Progressing)       Start:  09/19/24    Expected End:  10/03/24            Pt will transition supine <> sitting IND (Progressing)       Start:  09/19/24    Expected End:  10/03/24               Pain - Adult            Education Documentation  Mobility Training, taught by Kayley Trevino, PT at 9/19/2024  3:02 PM.  Learner: Family, Patient  Readiness: Acceptance  Method: Explanation  Response: Verbalizes Understanding    Education Comments  No comments found.

## 2024-09-19 NOTE — ANESTHESIA PREPROCEDURE EVALUATION
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   (+) CVA (cerebrovascular accident due to intracerebral hemorrhage) (Multi)      Hematology   (+) Anemia   (+) Anemia, unspecified type       Clinical information reviewed:   Tobacco  Allergies  Meds   Med Hx  Surg Hx   Fam Hx  Soc Hx        NPO Detail:  No data recorded     Physical Exam    Airway  Mallampati: II  TM distance: >3 FB  Neck ROM: full     Cardiovascular   Rate: normal     Dental   (+) upper dentures     Pulmonary - normal exam     Abdominal            Anesthesia Plan    History of general anesthesia?: yes  History of complications of general anesthesia?: no    ASA 3     MAC     The patient is not a current smoker.    intravenous induction   Anesthetic plan and risks discussed with patient.    Plan discussed with CAA.

## 2024-09-19 NOTE — PROGRESS NOTES
Occupational Therapy    Evaluation    Patient Name: Regulo Marques  MRN: [id number]  Today's Date: 9/19/2024  Time Calculation  Start Time: 1411  Stop Time: 1436  Time Calculation (min): 25 min  635/635-A    Assessment  IP OT Assessment  OT Assessment: From acute rehab for acute stroke/elevated troponin/MARIMAR: presented with low hemoglobin; readmitted and transfused;  Anemia, suspected recent upper GI bleed 8/12/2024  Hankins's esophagus, Gastritis with no focal ulcers.   * 9/19/2024 colonoscopy performed.  HPI: 9/2/2024 ER with acute onset of left sided weakness, expressive aphasia, dysarthria, mental status change, and slurred speech:  out to dinner with his wife tonight when these symptoms developed.  Patient would benefit from further OT to address ADL's and functional transfers/mobility while recovering from CVA.  Prognosis: Good  End of Session Communication: Bedside nurse  End of Session Patient Position: Up in chair, Alarm on; call-light within reach    Plan:  Treatment Interventions: ADL retraining, Patient/family training, Equipment evaluation/education, Neuromuscular reeducation, Functional transfer training, Compensatory technique education  OT Frequency: 3 times per week  OT Discharge Recommendations: Moderate intensity level of continued care  OT - OK to Discharge: Yes (to next level of care when medically cleared by phyisician/medical team)    Subjective   Current Problem:  1. Anemia, unspecified type  Colonoscopy Diagnostic    Colonoscopy Diagnostic    Surgical Pathology Exam    Surgical Pathology Exam    CANCELED: Surgical Pathology Exam    CANCELED: Surgical Pathology Exam        General:  General  Reason for Referral: ADL  Referred By: Paul Casarez MD  Past Medical History Relevant to Rehab: Diabetes, diverticulitis, hypertension, colon cancer, right hip replacement, tonsillectomy, cholecystectomy, colonoscopy, colostomy, prostate surgery, TURP    Family/Caregiver Present: Yes  Caregiver  Feedback: son and wife initially present in room to provide info regarding prior function and plans in moving  Co-Treatment: PT  Co-Treatment Reason: co-eval to maximize safety when assessing mobility tasks  Prior to Session Communication: Bedside nurse who confirmed that patient is medically stable to participate in this OT session  Patient Position Received: Bed, 2 rail up, Alarm on  General Comment: Patient seen in room; cooperative    Precautions:  Medical Precautions: Fall precautions    Pain:  Pain Assessment  0-10 (Numeric) Pain Score: 0 - No pain    Objective   Cognition:  Overall Cognitive Status: Within Functional Limits (however recent perceptual deficits including motor planning/apraxia; to further address)  Orientation Level: Oriented X4 (with cues for month)     Home Living:  Type of Home: House (however in transition to moving into [facility] with shower stall, grab bars for shower and toilet)  Lives With: Spouse     Prior Function:  Level of [place]: Independent with ADLs and functional transfers (prior to CVA/hospitalizations)  Hand Dominance: Right  Prior Function Comments: drives, shops    Current ADL:  LE Dressing Assistance: Total  LE Dressing Deficit: Don/doff R sock, Don/doff L sock  ADL Comments: To further address in OT sessions using ADL adaptive equipment/assistive techniques to facilitate independence and ease in performance.    Bed Mobility/Transfers:   Bed Mobility  Bed Mobility: Yes  Bed Mobility 1  Bed Mobility 1: Supine to sitting  Level of Assistance 1:  (SBA with difficulty)  Bed Mobility Comments 1: HOB elevated  Transfers  Transfer: Yes  Transfer 1  Transfer From 1: Sit to, Stand to  Transfer to 1: Bed, Chair with arms  Transfer Device 1: Walker  Transfer Level of Assistance 1: Contact guard, Minimal verbal cues    Ambulation/Gait Training:  Functional Mobility  Functional Mobility Performed: Yes  Functional Mobility 1  Device 1: Rolling walker  Assistance 1:  Contact guard  Comments 1: few steps from hospital bed to chair    Sitting Balance:  Static Sitting Balance  Static Sitting-Level of Assistance:  (SBA)    Standing Balance:  Static Standing Balance  Static Standing-Level of Assistance: Contact guard (fair (+))    Sensation:  Sensation Comment: chronic numbness/tingling from CTS since worked as  in past    Coordination:  Coordination Comment: thumb opposition to each finger WFL     Extremities: RUE   RUE : Within Functional Limits and LUE   LUE: Within Functional Limits (comparable strength to RUE:  elbow and hand  4+/5)    Outcome Measures: [facility] Daily Activity  Putting on and taking off regular lower body clothing: A lot  Bathing (including washing, rinsing, drying): A lot  Putting on and taking off regular upper body clothing: A lot  Toileting, which includes using toilet, bedpan or urinal: Total  Taking care of personal grooming such as brushing teeth: A lot  Eating Meals: A little  Daily Activity - Total Score: 12     EDUCATION:  Education Documentation  Mobility Training, taught by Amanda Melara, OT at 9/19/2024  3:55 PM.  Learner: Patient  Readiness: Acceptance  Method: Explanation  Response: Verbalizes Understanding, Demonstrated Understanding, Needs Reinforcement  Comment: instruction and cues as patient 'plopped' into chair    Goals:   Encounter Problems       Encounter Problems (Active)       OT Goals       Patient will complete upper and lower body bathing/dressing; toileting with supervision using adaptive equipment as needed  (Progressing)       Start:  09/19/24    Expected End:  10/03/24            Patient will perform bed mobility and functional transfers safely with supervision: bed, chair, commode using DME as needed  (Progressing)       Start:  09/19/24    Expected End:  10/03/24            Patient will tolerate standing for 5 mins. and show overall good (-) standing balance during ADL's and functional transfers/mobility   (Progressing)       Start:  09/19/24    Expected End:  10/03/24            Patient will apply compensatory techniques for decrease in perception with minimal cues (Progressing)       Start:  09/19/24    Expected End:  10/03/24

## 2024-09-19 NOTE — CARE PLAN
The patient's goals for the shift include  to have colonoscopy completed    The clinical goals for the shift include pt will remain HDS throughout the shift

## 2024-09-19 NOTE — PROGRESS NOTES
Regulo Marques is a 83 y.o. male on day 3 of admission presenting with Anemia, unspecified type.      Subjective   Patient was seen and examined this morning.  No acute events overnight.    Objective     Last Recorded Vitals  /72   Pulse 64   Temp 36.1 °C (97 °F) (Temporal)   Resp 18   Wt 93 kg (205 lb 0.4 oz)   SpO2 100%   Intake/Output last 3 Shifts:  No intake or output data in the 24 hours ending 09/19/24 0858      Admission Weight  Weight: 93 kg (205 lb 0.4 oz) (09/18/24 2025)    Daily Weight  09/18/24 : 93 kg (205 lb 0.4 oz)    Image Results  XR abdomen 1 view  Narrative: Interpreted By:  Gomez Rosenthal,   STUDY:  XR ABDOMEN 1 VIEW;  9/17/2024 6:55 pm      INDICATION:  Signs/Symptoms:NG placement.          COMPARISON:  None.      ACCESSION NUMBER(S):  [id number]      ORDERING CLINICIAN:  JESSICA CANALES      FINDINGS:  NG/OG tube terminates over the gastric body. The right upper quadrant  cholecystectomy clips.      There are no dilated loops of large or small bowel.      There is a mild colonic stool burden.      There is no evidence of free intraperitoneal air.      No pneumatosis or portal venous gas identified.      No acute osseous abnormalities.      There is dense airspace disease and pleural effusion at the left lung  base.      Impression: Large area of dense airspace disease at the left lung base concerning  for pneumonia/aspiration with pleural effusion.      - NG/OG tube termination: Gastric body.      MACRO:  None.      Signed by: Gomez Rosenthal 9/17/2024 7:09 PM  Dictation workstation:   [id number]  XR abdomen 1 view  Narrative: Interpreted By:  Melody Kaplan,   STUDY:  XR ABDOMEN 1 VIEW;  9/17/2024 5:45 pm      INDICATION:  Signs/Symptoms:NG tube placement.          COMPARISON:  05/24/2021      ACCESSION NUMBER(S):  [id number]      ORDERING CLINICIAN:  JESSICA CANALES      FINDINGS:  Enteric tube tip projects over the stomach, 1st side hole over the GE  junction. Consider  appropriate repositioning. Visualized upper  abdominal bowel gas pattern nonobstructive. Left lower lobe opacities  and probably an effusion, findings which would be better assessed  with dedicated chest radiograph as warranted.      Impression: Enteric tube tip projects over the stomach, 1st side hole over the GE  junction. Consider appropriate repositioning. Orange alert.      Left-sided pulmonary opacities with probable effusion, would be  better assessed on two-view chest radiograph.      Critical Finding:  See findings. Notification was initiated on  9/17/2024 at 6:23 pm by  Melody Kaplan.  (**-OCF-**) Instructions:                  MACRO:  None      Signed by: Melody Kaplan 9/17/2024 6:23 PM  Dictation workstation:   [id number]      Physical Exam  Constitutional:       Appearance: Normal appearance.   HENT:      Head: Normocephalic.   Eyes:      Pupils: Pupils are equal, round, and reactive to light.   Cardiovascular:      Rate and Rhythm: Normal rate and regular rhythm.   Pulmonary:      Effort: Pulmonary effort is normal.      Breath sounds: Normal breath sounds.   Abdominal:      General: Abdomen is flat. Bowel sounds are normal.      Palpations: Abdomen is soft.   Skin:     General: Skin is warm and dry.      Capillary Refill: Capillary refill takes less than 2 seconds.   Neurological:      General: No focal deficit present.      Mental Status: He is alert and oriented to person, place, and time.   Psychiatric:         Mood and Affect: Mood normal.         Behavior: Behavior normal.       Relevant Results               Assessment/Plan   Regulo Marques is a 83 year old Male with a past medical history of A-fib, colon cancer s/p colectomy, multiple small bowel obstructions, prostate cancer, unresectable rectal cancer s/p chemo and radiation therapy, diverticulitis, diabetes, hypertension, gout who presents to [facility] 9/2/2024 with strokelike symptoms.  Of note, he was admitted to the [facility] ED 8/12/2024  for with an upper GI bleed with hemoglobin of 6.1 requiring EGD and multiple transfusions.  Because of this he was taken off his Eliquis.  GI team was already consulted earlier this admission on 9/3/2024, and decision was made by GI team and neuro consult to restart Eliquis 5 to 7 days after stroke was confirmed.  GI team was reconsulted 9/16 due to patient's downtrending H/H after restarting Eliquis.     Acute CVA  Anemia, unknown etiology  Hankins's esophagus  Gastritis with no focal ulcers  -Patient's Eliquis was restarted 9/12 and discontinued 9/16 due to downtrending H/H  -Patient's hemoglobin appears to be stable, maintain hemoglobin over 7.2  -Colonoscopy 9/19/2024 significant for 1 polyp in ascending colon, no etiology of anemia identified, no further colonoscopies recommended  -Hemoglobin appears to be stable at 7.6, no signs or subjective symptoms of overt bleeding, suspect multifactorial contributions to anemia  -Anemia from GI etiology very unlikely at this time, but will proceed with video capsule endoscopy to further rule out GI etiology of anemia.  -In the event that video capsule procedure is unremarkable, we will suggest that patient continues anticoagulation and only stop when there is overt GI bleed/dark tarry/bloody stools and downtrending hemoglobin. Decreasing risk of another stroke is a priority.    Dr. Gurwinder Yeung   Internal Medicine PGY-1  Available on [employer] for any questions.     This is a preliminary note pending signature from the attending physician.

## 2024-09-19 NOTE — ANESTHESIA POSTPROCEDURE EVALUATION
Patient: Regulo Marques    Procedure Summary       Date: 09/19/24 Room / Location: [facility]    Anesthesia Start: 1045 Anesthesia Stop: 1141    Procedure: COLONOSCOPY Diagnosis: Anemia, unspecified type    Scheduled Providers: Arun Lai MD; Uzair Arias MD Responsible Provider: Uzair Arias MD    Anesthesia Type: MAC ASA Status: 3            Anesthesia Type: MAC    Vitals Value Taken Time   /56 09/19/24 1217   Temp 36 °C (96.8 °F) 09/19/24 1140   Pulse 64 09/19/24 1215   Resp 17 09/19/24 1215   SpO2 98 % 09/19/24 1215   Vitals shown include unfiled device data.    Anesthesia Post Evaluation    Patient location during evaluation: PACU  Patient participation: complete - patient participated  Level of consciousness: awake and alert  Pain management: adequate  Airway patency: patent  Cardiovascular status: acceptable  Respiratory status: acceptable  Hydration status: acceptable  Postoperative Nausea and Vomiting: none        No notable events documented.

## 2024-09-19 NOTE — PROGRESS NOTES
09/19/24 1141   Discharge Planning   Living Arrangements Spouse/significant other   Support Systems Spouse/significant other;Children   Expected Discharge Disposition SNF   Does the patient need discharge transport arranged? Yes   RoundTrip coordination needed? Yes     [facility] not [facility] patient [facility].  He has discharge orders but is currently off unit for colonoscopy and will need PT/OT evals for SNF placement.  This TCC to follow up with patient post procedure.  [facility]  1630:  Patient not ready for discharge, just had NG tube removed and just swallowed capsule for capsule endoscopy.  Per GI dr, issue of anticoagulation needs to be addressed prior to discharge.  Patient was finally able to work with [facility], [facility] 15.  Family called patient's insurance and [facility] Medicare are still willing to cover AR.  [facility] to rereview.  Per family, if patient is still denied, back up plan would be [facility].  Care Coordination team following.

## 2024-09-20 LAB
ALBUMIN SERPL BCP-MCNC: 3.1 G/DL (ref 3.4–5)
ALP SERPL-CCNC: 104 U/L (ref 33–136)
ALT SERPL W P-5'-P-CCNC: 44 U/L (ref 10–52)
ANION GAP SERPL CALC-SCNC: 16 MMOL/L (ref 10–20)
AST SERPL W P-5'-P-CCNC: 33 U/L (ref 9–39)
BILIRUB SERPL-MCNC: 0.7 MG/DL (ref 0–1.2)
BUN SERPL-MCNC: 35 MG/DL (ref 6–23)
CALCIUM SERPL-MCNC: 8.7 MG/DL (ref 8.6–10.3)
CHLORIDE SERPL-SCNC: 110 MMOL/L (ref 98–107)
CO2 SERPL-SCNC: 19 MMOL/L (ref 21–32)
CREAT SERPL-MCNC: 1.42 MG/DL (ref 0.5–1.3)
EGFRCR SERPLBLD CKD-EPI 2021: 48 ML/MIN/1.73M*2
GLUCOSE BLD MANUAL STRIP-MCNC: 112 MG/DL (ref 74–99)
GLUCOSE BLD MANUAL STRIP-MCNC: 124 MG/DL (ref 74–99)
GLUCOSE BLD MANUAL STRIP-MCNC: 126 MG/DL (ref 74–99)
GLUCOSE BLD MANUAL STRIP-MCNC: 137 MG/DL (ref 74–99)
GLUCOSE SERPL-MCNC: 97 MG/DL (ref 74–99)
HAPTOGLOB SERPL NEPH-MCNC: 245 MG/DL (ref 30–200)
HGB RETIC QN: 30 PG (ref 28–38)
IMMATURE RETIC FRACTION: 23.3 %
POTASSIUM SERPL-SCNC: 4.5 MMOL/L (ref 3.5–5.3)
PROT SERPL-MCNC: 5.9 G/DL (ref 6.4–8.2)
RETICS #: 0.09 X10*6/UL (ref 0.02–0.11)
RETICS/RBC NFR AUTO: 3.5 % (ref 0.5–2)
SODIUM SERPL-SCNC: 140 MMOL/L (ref 136–145)

## 2024-09-20 PROCEDURE — 85045 AUTOMATED RETICULOCYTE COUNT: CPT

## 2024-09-20 PROCEDURE — 99232 SBSQ HOSP IP/OBS MODERATE 35: CPT

## 2024-09-20 PROCEDURE — 97116 GAIT TRAINING THERAPY: CPT | Mod: GP,CQ

## 2024-09-20 PROCEDURE — 82947 ASSAY GLUCOSE BLOOD QUANT: CPT

## 2024-09-20 PROCEDURE — 1100000001 HC PRIVATE ROOM DAILY

## 2024-09-20 PROCEDURE — 91110 GI TRC IMG INTRAL ESOPH-ILE: CPT | Performed by: INTERNAL MEDICINE

## 2024-09-20 PROCEDURE — 97535 SELF CARE MNGMENT TRAINING: CPT | Mod: CO,GO

## 2024-09-20 PROCEDURE — 80053 COMPREHEN METABOLIC PANEL: CPT

## 2024-09-20 PROCEDURE — 2500000002 HC RX 250 W HCPCS SELF ADMINISTERED DRUGS (ALT 637 FOR MEDICARE OP, ALT 636 FOR OP/ED): Performed by: INTERNAL MEDICINE

## 2024-09-20 PROCEDURE — 2500000001 HC RX 250 WO HCPCS SELF ADMINISTERED DRUGS (ALT 637 FOR MEDICARE OP): Performed by: INTERNAL MEDICINE

## 2024-09-20 PROCEDURE — 36415 COLL VENOUS BLD VENIPUNCTURE: CPT

## 2024-09-20 PROCEDURE — 83010 ASSAY OF HAPTOGLOBIN QUANT: CPT | Mod: PARLAB

## 2024-09-20 ASSESSMENT — COGNITIVE AND FUNCTIONAL STATUS - GENERAL
DRESSING REGULAR LOWER BODY CLOTHING: A LOT
PERSONAL GROOMING: A LOT
STANDING UP FROM CHAIR USING ARMS: A LOT
TURNING FROM BACK TO SIDE WHILE IN FLAT BAD: A LOT
MOBILITY SCORE: 13
EATING MEALS: A LOT
DRESSING REGULAR UPPER BODY CLOTHING: A LITTLE
HELP NEEDED FOR BATHING: A LOT
HELP NEEDED FOR BATHING: A LOT
MOBILITY SCORE: 12
TOILETING: A LOT
MOVING TO AND FROM BED TO CHAIR: A LOT
TOILETING: A LOT
STANDING UP FROM CHAIR USING ARMS: A LITTLE
CLIMB 3 TO 5 STEPS WITH RAILING: TOTAL
MOVING FROM LYING ON BACK TO SITTING ON SIDE OF FLAT BED WITH BEDRAILS: A LITTLE
MOBILITY SCORE: 15
MOVING FROM LYING ON BACK TO SITTING ON SIDE OF FLAT BED WITH BEDRAILS: A LOT
MOVING TO AND FROM BED TO CHAIR: A LOT
STANDING UP FROM CHAIR USING ARMS: A LOT
EATING MEALS: A LOT
WALKING IN HOSPITAL ROOM: A LOT
EATING MEALS: A LITTLE
DRESSING REGULAR LOWER BODY CLOTHING: TOTAL
DRESSING REGULAR UPPER BODY CLOTHING: A LOT
PERSONAL GROOMING: A LOT
DRESSING REGULAR UPPER BODY CLOTHING: A LOT
WALKING IN HOSPITAL ROOM: A LOT
MOVING FROM LYING ON BACK TO SITTING ON SIDE OF FLAT BED WITH BEDRAILS: A LITTLE
TURNING FROM BACK TO SIDE WHILE IN FLAT BAD: A LOT
DAILY ACTIVITIY SCORE: 11
DRESSING REGULAR LOWER BODY CLOTHING: TOTAL
CLIMB 3 TO 5 STEPS WITH RAILING: A LOT
MOBILITY SCORE: 12
TOILETING: A LOT
DRESSING REGULAR LOWER BODY CLOTHING: A LOT
HELP NEEDED FOR BATHING: A LOT
CLIMB 3 TO 5 STEPS WITH RAILING: A LOT
PERSONAL GROOMING: A LITTLE
WALKING IN HOSPITAL ROOM: A LOT
DRESSING REGULAR UPPER BODY CLOTHING: A LOT
DAILY ACTIVITIY SCORE: 11
DAILY ACTIVITIY SCORE: 16
TURNING FROM BACK TO SIDE WHILE IN FLAT BAD: A LOT
WALKING IN HOSPITAL ROOM: A LOT
MOVING TO AND FROM BED TO CHAIR: A LOT
STANDING UP FROM CHAIR USING ARMS: A LOT
HELP NEEDED FOR BATHING: A LITTLE
CLIMB 3 TO 5 STEPS WITH RAILING: A LOT
TURNING FROM BACK TO SIDE WHILE IN FLAT BAD: A LITTLE
MOVING TO AND FROM BED TO CHAIR: A LITTLE
MOVING FROM LYING ON BACK TO SITTING ON SIDE OF FLAT BED WITH BEDRAILS: A LOT

## 2024-09-20 ASSESSMENT — PAIN SCALES - GENERAL
PAINLEVEL_OUTOF10: 0 - NO PAIN
PAINLEVEL_OUTOF10: 0 - NO PAIN

## 2024-09-20 ASSESSMENT — PAIN - FUNCTIONAL ASSESSMENT: PAIN_FUNCTIONAL_ASSESSMENT: 0-10

## 2024-09-20 ASSESSMENT — ACTIVITIES OF DAILY LIVING (ADL): HOME_MANAGEMENT_TIME_ENTRY: 15

## 2024-09-20 NOTE — PROGRESS NOTES
Physical Therapy    Physical Therapy Treatment    Patient Name: Regulo Marques  MRN: [id number]  Department: [facility]  Room: [address] [place] [address]  Today's Date: 9/20/2024  Time Calculation  Start Time: 1316  Stop Time: 1340  Time Calculation (min): 24 min         Assessment/Plan   PT Assessment  End of Session Communication: Bedside nurse  End of Session Patient Position: Up in chair, Alarm on (call light and needs within reach.)     PT Plan  Treatment/Interventions: Bed mobility, Transfer training, Gait training, Balance training, Strengthening, Endurance training, Range of motion, Therapeutic exercise, Therapeutic activity  PT Plan: Ongoing PT  PT Frequency: 4 times per week  PT Discharge Recommendations: Moderate intensity level of continued care  PT Recommended Transfer Status: Assist x1  PT - OK to Discharge: Yes (once medically cleared)      General Visit Information:   PT  Visit  PT Received On: 09/20/24  General  Co-Treatment: OT  Co-Treatment Reason: maximize safety and functional mobility  Prior to Session Communication: Bedside nurse  Patient Position Received: Up in chair, Alarm on  General Comment: Patient pleasant and agreeable to therapy.    Subjective   Precautions:  Precautions  Hearing/Visual Limitations: [place]  Medical Precautions: Fall precautions    Objective   Pain:  Pain Assessment  Pain Assessment: 0-10  0-10 (Numeric) Pain Score: 0 - No pain  Cognition:  Cognition  Orientation Level: Oriented X4  Treatments:  Ambulation/Gait Training  Ambulation/Gait Training Performed: Yes  Ambulation/Gait Training 1  Comments/Distance (ft) 1: Patient ambulated ~75ft x2 trials with ModA x1-2 and FWW for support. Patient demo's step through gait with L neglect requiring cues for environmental awareness and walker management. occasional L LE scissoring causing unsteadiness. Consistent cues for posture.  Transfers  Transfer: Yes  Transfer 1  Trials/Comments 1: Patient performed sit<>stand with CGA x1. Cues for initiation and  pacing d/t impulsivity at times.  Transfers 2  Trials/Comments 2: Patient performed stand pivot recliner>BSC with FWW and CGA/[place] x1. Cues for walker management and alignment with surface prior to sitting.    Outcome Measures:  [facility] Basic Mobility  Turning from your back to your side while in a flat bed without using bedrails: A little  Moving from lying on your back to sitting on the side of a flat bed without using bedrails: A little  Moving to and from bed to chair (including a wheelchair): A little  Standing up from a chair using your arms (e.g. wheelchair or bedside chair): A little  To walk in hospital room: A lot  Climbing 3-5 steps with railing: Total  Basic Mobility - Total Score: 15    Education Documentation  Mobility Training, taught by Tyra Jaramillo PTA at 9/20/2024  2:42 PM.  Learner: Patient  Readiness: Acceptance  Method: Explanation  Response: Verbalizes Understanding, Needs Reinforcement  Comment: Educated patient on L sided awareness with ambulation and overall functional mobility d/t increase safety.    Education Comments  No comments found.        OP EDUCATION:       Encounter Problems       Encounter Problems (Active)       PT Problem       Pt will perform a B LE ther ex program of 2-3 sets of 10  (Progressing)       Start:  09/19/24    Expected End:  10/03/24            Pt will amb 180' using w/w with SBA  (Progressing)       Start:  09/19/24    Expected End:  10/03/24            Pt will transfer STS IND  (Progressing)       Start:  09/19/24    Expected End:  10/03/24            Pt will transition supine <> sitting IND (Progressing)       Start:  09/19/24    Expected End:  10/03/24               Pain - Adult

## 2024-09-20 NOTE — PROGRESS NOTES
Regulo Marques is a 83 y.o. male on day 4 of admission presenting with Anemia, unspecified type.      Subjective   Patient was seen and examined this morning.  No acute events overnight.    Objective     Last Recorded Vitals  /63   Pulse (!) 47   Temp 35.4 °C (95.7 °F)   Resp 20   Wt 93 kg (205 lb 0.4 oz)   SpO2 99%   Intake/Output last 3 Shifts:  No intake or output data in the 24 hours ending 09/20/24 1637      Admission Weight  Weight: 93 kg (205 lb 0.4 oz) (09/18/24 2025)    Daily Weight  09/19/24 : 93 kg (205 lb 0.4 oz)    Image Results  Capsule Endoscopy Small Intestine  Images from the original result were not included.  Indication:  Iron deficiency anemia/GI bleeding    Procedure and Findings:  Capsule was swallowed  First Gastric image: 00:00:50  It appears that the cappsule was retained in the stomach for the entire   duration of the study.   Large amount of food was seen after 4 hours of the study but it appears   that the last image at 13:00:24 was still gastric mucosa    Impressions:  Retained capsule in the stomach  Will obtain serial xrays to ensure capsule is out of the colon       Physical Exam  Constitutional:       Appearance: Normal appearance.   HENT:      Head: Normocephalic.   Eyes:      Pupils: Pupils are equal, round, and reactive to light.   Cardiovascular:      Rate and Rhythm: Normal rate and regular rhythm.   Pulmonary:      Effort: Pulmonary effort is normal.      Breath sounds: Normal breath sounds.   Abdominal:      General: Abdomen is flat. Bowel sounds are normal.      Palpations: Abdomen is soft.   Skin:     General: Skin is warm and dry.      Capillary Refill: Capillary refill takes less than 2 seconds.   Neurological:      General: No focal deficit present.      Mental Status: He is alert and oriented to person, place, and time.   Psychiatric:         Mood and Affect: Mood normal.         Behavior: Behavior normal.         Relevant Results               Assessment/Plan    Regulo Marques is a 83 year old Male with a past medical history of A-fib, colon cancer s/p colectomy, multiple small bowel obstructions, prostate cancer, unresectable rectal cancer s/p chemo and radiation therapy, diverticulitis, diabetes, hypertension, gout who presents to [facility] ED 9/2/2024 with strokelike symptoms.  Of note, he was admitted to the [facility] ED 8/12/2024 for with an upper GI bleed with hemoglobin of 6.1 requiring EGD and multiple transfusions.  Because of this he was taken off his Eliquis.  GI team was already consulted earlier this admission on 9/3/2024, and decision was made by GI team and neuro consult to restart Eliquis 5 to 7 days after stroke was confirmed.  GI team was reconsulted 9/16 due to patient's downtrending H/H after restarting Eliquis.     Acute CVA  Anemia, unknown etiology  Hankins's esophagus  Gastritis with no focal ulcers  -Patient's Eliquis was restarted 9/12 and discontinued 9/16 due to downtrending H/H  -Patient's hemoglobin appears to be stable, maintain hemoglobin over 7.2  -Colonoscopy 9/19/2024 significant for 1 polyp in ascending colon, no etiology of anemia identified, no further colonoscopies recommended  -Hemoglobin appears to be stable at 7.6, no signs or subjective symptoms of overt bleeding, suspect multifactorial contributions to anemia  -Video capsule endoscopy was unfortunately incomplete due to capsule being stuck in stomach for over 13 hours  -We suggest that patient continues anticoagulation and only stop when there is overt GI bleed/dark tarry/bloody stools and downtrending hemoglobin. Decreasing risk of another stroke is a priority.    Dr. Gurwinder Yeung   Internal Medicine PGY-1  Available on [email or web address] for any questions.     This is a preliminary note pending signature from the attending physician.

## 2024-09-20 NOTE — PROGRESS NOTES
09/20/24 0904   Discharge Planning   Living Arrangements Spouse/significant other   Support Systems Spouse/significant other;Children   Expected Discharge Disposition Rehab   Does the patient need discharge transport arranged? No     Capsule endoscopy in process.  Patient and family Very upset that [facility] not accepting back.  Diagnosis is AR appropriate and patient presented from [facility] where he had only been for 4 days.  AMPAC score 15 and patient is very able and motivated to participate in intense therapies in order to regain functional abilities to transition to [facility] with wife.  This TCC asked [facility] to reevaluate for acceptance as his insurance, [facility] Medicare, has approved AR.  [facility] TCC  1445:  Son and wife are now agreeable to SNF; precert started to [facility], as he has discharge orders.  1725:  Per secure chat, our DSC team declined to start precert when asked because [facility] will not have bed available until Monday.  They recommended precert be started Sunday and are now gone for the day.

## 2024-09-20 NOTE — PROGRESS NOTES
Occupational Therapy    OT Treatment    Patient Name: Regulo Marques  MRN: [id number]  Department: [facility]  Room: [address] [place] [address]  Today's Date: 9/20/2024  Time Calculation  Start Time: 1323  Stop Time: 1347  Time Calculation (min): 24 min        Assessment:  End of Session Communication: Bedside nurse  End of Session Patient Position: Up in chair, Alarm on     Plan:  Treatment Interventions: ADL retraining, Patient/family training, Equipment evaluation/education, Neuromuscular reeducation, Functional transfer training, Compensatory technique education  OT Frequency: 3 times per week  OT Discharge Recommendations: Moderate intensity level of continued care  OT - OK to Discharge: Yes (to next level of care when medically cleared by phyisician/medical team)  Treatment Interventions: ADL retraining, Patient/family training, Equipment evaluation/education, Neuromuscular reeducation, Functional transfer training, Compensatory technique education    Subjective   Previous Visit Info:  OT Last Visit  OT Received On: 09/20/24  General:  General  Co-Treatment: PT  Co-Treatment Reason: maximize safety and functional mobility  Prior to Session Communication: Bedside nurse  Patient Position Received: Up in chair, Alarm on  General Comment: Patient pleasant and agreeable to therapy.  Precautions:  Hearing/Visual Limitations: [place]  Medical Precautions: Fall precautions           Objective         Activities of Daily Living: LE Dressing  LE Dressing: Yes  Pants Level of Assistance: Moderate assistance (pt required assistance to thread LE's and then required assistance on L side d/t unable to use L UE to assist with pulling up/down)  LE Dressing Where Assessed: Chair    Toileting  Toileting Level of Assistance: Moderate assistance  Where Assessed: Bedside commode  Functional Standing Tolerance:  Time: 5:00 standing at FWW x2 trials  Bed Mobility/Transfers: Transfers  Transfer: Yes  Transfer 1  Technique 1: Sit to stand, Stand to sit  Transfer  Device 1: Walker  Transfer Level of Assistance 1: Contact guard    Toilet Transfers  Toilet Transfer From: Rolling walker  Toilet Transfer Type: To and from  Toilet Transfer to: Standard bedside commode  Toilet Transfer Technique: Ambulating  Toilet Transfers: Minimal assistance    Functional Mobility:  Functional Mobility  Functional Mobility Performed: Yes  Functional Mobility 1  Device 1: Rolling walker  Assistance 1:  (Min-Mod A  d/t  noted with L side neglect and would run into items on L side despite cues given)  Comments 1: pt ambulated in room and into hallway. when pt would turn L more assistance needed then when pt turning R      Outcome Measures:[facility] Daily Activity  Putting on and taking off regular lower body clothing: A lot  Bathing (including washing, rinsing, drying): A little  Putting on and taking off regular upper body clothing: A little  Toileting, which includes using toilet, bedpan or urinal: A lot  Taking care of personal grooming such as brushing teeth: A little  Eating Meals: A little  Daily Activity - Total Score: 16        Education Documentation  Body Mechanics, taught by SHAWN Gaming at 9/20/2024  2:53 PM.  Learner: Patient  Readiness: Acceptance  Method: Explanation  Response: Verbalizes Understanding, Needs Reinforcement    ADL Training, taught by SHAWN Gaming at 9/20/2024  2:53 PM.  Learner: Patient  Readiness: Acceptance  Method: Explanation  Response: Verbalizes Understanding, Needs Reinforcement    Education Comments  No comments found.          Goals:  Encounter Problems       Encounter Problems (Active)       OT Goals       Patient will complete upper and lower body bathing/dressing; toileting with supervision using adaptive equipment as needed  (Progressing)       Start:  09/19/24    Expected End:  10/03/24            Patient will perform bed mobility and functional transfers safely with supervision: bed, chair, commode using DME as needed  (Progressing)        Start:  09/19/24    Expected End:  10/03/24            Patient will tolerate standing for 5 mins. and show overall good (-) standing balance during ADL's and functional transfers/mobility  (Progressing)       Start:  09/19/24    Expected End:  10/03/24            Patient will apply compensatory techniques for decrease in perception with minimal cues (Progressing)       Start:  09/19/24    Expected End:  10/03/24

## 2024-09-20 NOTE — PROGRESS NOTES
Seen/exam/meds/labs/notes reviewed  Had colonoscopy and polypectomy today; currently undergoing capsule endoscopy  Presently resting comfortably  Afvss  Anemia stable  Exam no substantive changes  A/p; recurrent severe anemia; gib; paf, stroke, et al  Await capsule endoscopy results; cont supportive care; 79 asa; dc planning  This pt has repeatedly demonstrated his anticoagulation intolerance and there exist no studies I am aware of that demonstrate any morbidity or mortality benefit to support such a position    Assessment & Plan  Anemia, unspecified type

## 2024-09-20 NOTE — CARE PLAN
The patient's goals for the shift include      Problem: Safety - Adult  Goal: Free from fall injury  Outcome: Progressing  Flowsheets (Taken 9/20/2024 1255)  Free from fall injury: Instruct family/caregiver on patient safety     Problem: Skin  Goal: Prevent/minimize sheer/friction injuries  Outcome: Progressing  Flowsheets (Taken 9/20/2024 1255)  Prevent/minimize sheer/friction injuries:   HOB 30 degrees or less   Turn/reposition every 2 hours/use positioning/transfer devices     The clinical goals for the shift include pt will remain hemodynamically stable throughout shift.

## 2024-09-21 ENCOUNTER — APPOINTMENT (OUTPATIENT)
Dept: RADIOLOGY | Facility: HOSPITAL | Age: 84
DRG: 811 | End: 2024-09-21
Payer: MEDICARE

## 2024-09-21 LAB
GLUCOSE BLD MANUAL STRIP-MCNC: 115 MG/DL (ref 74–99)
GLUCOSE BLD MANUAL STRIP-MCNC: 153 MG/DL (ref 74–99)
GLUCOSE BLD MANUAL STRIP-MCNC: 157 MG/DL (ref 74–99)
GLUCOSE BLD MANUAL STRIP-MCNC: 161 MG/DL (ref 74–99)

## 2024-09-21 PROCEDURE — 1100000001 HC PRIVATE ROOM DAILY

## 2024-09-21 PROCEDURE — 2500000002 HC RX 250 W HCPCS SELF ADMINISTERED DRUGS (ALT 637 FOR MEDICARE OP, ALT 636 FOR OP/ED): Performed by: INTERNAL MEDICINE

## 2024-09-21 PROCEDURE — 74018 RADEX ABDOMEN 1 VIEW: CPT | Performed by: RADIOLOGY

## 2024-09-21 PROCEDURE — 82947 ASSAY GLUCOSE BLOOD QUANT: CPT

## 2024-09-21 PROCEDURE — 2500000001 HC RX 250 WO HCPCS SELF ADMINISTERED DRUGS (ALT 637 FOR MEDICARE OP): Performed by: INTERNAL MEDICINE

## 2024-09-21 PROCEDURE — 74018 RADEX ABDOMEN 1 VIEW: CPT

## 2024-09-21 PROCEDURE — 99233 SBSQ HOSP IP/OBS HIGH 50: CPT | Performed by: NURSE PRACTITIONER

## 2024-09-21 PROCEDURE — 2500000004 HC RX 250 GENERAL PHARMACY W/ HCPCS (ALT 636 FOR OP/ED): Performed by: NURSE PRACTITIONER

## 2024-09-21 RX ORDER — POLYETHYLENE GLYCOL 3350 17 G/17G
17 POWDER, FOR SOLUTION ORAL DAILY
Status: DISPENSED | OUTPATIENT
Start: 2024-09-21

## 2024-09-21 ASSESSMENT — COGNITIVE AND FUNCTIONAL STATUS - GENERAL
STANDING UP FROM CHAIR USING ARMS: A LOT
MOVING TO AND FROM BED TO CHAIR: A LOT
TURNING FROM BACK TO SIDE WHILE IN FLAT BAD: A LOT
CLIMB 3 TO 5 STEPS WITH RAILING: A LOT
WALKING IN HOSPITAL ROOM: A LOT
TURNING FROM BACK TO SIDE WHILE IN FLAT BAD: A LOT
MOVING FROM LYING ON BACK TO SITTING ON SIDE OF FLAT BED WITH BEDRAILS: A LOT
STANDING UP FROM CHAIR USING ARMS: A LOT
MOBILITY SCORE: 11
WALKING IN HOSPITAL ROOM: A LOT
DAILY ACTIVITIY SCORE: 12
MOVING FROM LYING ON BACK TO SITTING ON SIDE OF FLAT BED WITH BEDRAILS: A LOT
PERSONAL GROOMING: A LOT
CLIMB 3 TO 5 STEPS WITH RAILING: TOTAL
DRESSING REGULAR UPPER BODY CLOTHING: A LOT
MOVING TO AND FROM BED TO CHAIR: A LOT
MOBILITY SCORE: 12
TOILETING: A LOT
DRESSING REGULAR LOWER BODY CLOTHING: A LOT
HELP NEEDED FOR BATHING: A LOT
EATING MEALS: A LOT

## 2024-09-21 ASSESSMENT — PAIN SCALES - GENERAL
PAINLEVEL_OUTOF10: 0 - NO PAIN
PAINLEVEL_OUTOF10: 0 - NO PAIN

## 2024-09-21 ASSESSMENT — PAIN SCALES - WONG BAKER: WONGBAKER_NUMERICALRESPONSE: NO HURT

## 2024-09-21 NOTE — PROGRESS NOTES
Seen/exam/meds/labs/notes reviewed  Sleeping in chair  Afvss  No new issues  Apparently the capsule endoscopy encountered technical issues  Otherwise stable  Apparently dc plan now for snf; but he'll be spending the weekend  A/p: as before; otherwise ready for hospital discharge      Assessment & Plan  Anemia, unspecified type

## 2024-09-21 NOTE — CARE PLAN
The patient's goals for the shift include      Problem: Safety - Adult  Goal: Free from fall injury  Outcome: Progressing  Flowsheets (Taken 9/21/2024 7463)  Free from fall injury: Instruct family/caregiver on patient safety     The clinical goals for the shift include pt will remain hemodynamically stable throughout shift

## 2024-09-21 NOTE — PROGRESS NOTES
S:  No physical complaints, pt spends the entire visit discussing how upset he is about not going back to [facility].    O:  36.2-52-/57-99% RA  Gen: a+ox3, sitting up in bedside chair, no distress  CVS: irregular/rate controlled, nl s1/s2, no m/r/g  Lungs: CTAB, no w/r/r  Abd: soft, obese, NT/ND, +BS  Ext: 2+ edema BLE to knees    A/P:  82 y/o male admitted with recurrent severe anemia secondary to anticoagulation for PAF requiring xfusion, GIB, PAF, h/o R MCA stroke, h/o  bleeding    H+H had stabilized.  GI following.  Capsule endoscopy got stuck in pt's stomach.    Pt is on ASA.  Plan is not to restart eliquis due to recurrent bleeding requiring admission and xfusion.    Will add compression stockings for edema though appears to be baseline.    Dc plan for [facility] SNF much to pt's chagrin not [facility].    Kulwant Casarez MD

## 2024-09-21 NOTE — PROGRESS NOTES
This note was created using voice recognition transcription software. Despite proofreading, unintentional typographical errors may be present. Please contact the GI office with any questions or concerns.       Subjective  Patient states he feels good.  Not having bowel movements or passing gas.        Physical Exam:  General: Sleepy  Skin:  Warm and dry, no jaundice  HEENT: No scleral icterus, no conjunctival pallor, normocephalic, atraumatic, mucous membranes moist  Neck:  atraumatic, trachea midline, no JVD  Chest:  Clear to auscultation bilaterally. No wheezes, rales, or rhonchi  CV:  Regular rate and rhythm.  Positive S1/S2  Abdomen: no distension, +BS, soft, non-tender to palpation, no rebound tenderness, no guarding, no rigidity, no discernible ascites   Extremities: chronic pigmentary changes, no cyanosis  Neurological:  A&Ox3  Psychiatric: cooperative     Investigations:  Labs, radiological imaging and cardiac work up were reviewed        Objective:         9/19/2024    12:40 PM 9/19/2024     4:00 PM 9/19/2024     7:49 PM 9/20/2024     3:53 AM 9/20/2024    12:07 PM 9/20/2024     7:39 PM 9/21/2024     4:00 AM   Vitals   Systolic 133 121 133 136 135 124 119   Diastolic 62 57 62 63 63 61 57   Heart Rate 60 64 62 55 47 58 52   Temp 35.8 °C (96.4 °F) 37.4 °C (99.3 °F) 36.9 °C (98.4 °F) 35.9 °C (96.6 °F) 35.4 °C (95.7 °F) 36.3 °C (97.3 °F) 36.2 °C (97.2 °F)   Resp  20 18 20             Medications:  aspirin, 81 mg, oral, Daily  atorvastatin, 80 mg, oral, Nightly  insulin lispro, 0-5 Units, subcutaneous, TID  sennosides, 1 tablet, oral, Nightly  tamsulosin, 0.8 mg, oral, Nightly         Recent Results (from the past 72 hour(s))   POCT GLUCOSE    Collection Time: 09/18/24 12:11 PM   Result Value Ref Range    POCT Glucose 111 (H) 74 - 99 mg/dL   POCT GLUCOSE    Collection Time: 09/18/24  4:20 PM   Result Value Ref Range    POCT Glucose 115 (H) 74 - 99 mg/dL   POCT GLUCOSE    Collection Time: 09/18/24  8:27 PM    Result Value Ref Range    POCT Glucose 101 (H) 74 - 99 mg/dL   CBC and Auto Differential    Collection Time: 09/19/24  6:28 AM   Result Value Ref Range    WBC 10.0 4.4 - 11.3 x10*3/uL    nRBC 0.0 0.0 - 0.0 /100 WBCs    RBC 2.49 (L) 4.50 - 5.90 x10*6/uL    Hemoglobin 7.6 (L) 13.5 - 17.5 g/dL    Hematocrit 24.5 (L) 41.0 - 52.0 %    MCV 98 80 - 100 fL    MCH 30.5 26.0 - 34.0 pg    MCHC 31.0 (L) 32.0 - 36.0 g/dL    RDW 16.5 (H) 11.5 - 14.5 %    Platelets 227 150 - 450 x10*3/uL    Neutrophils % 83.4 40.0 - 80.0 %    Immature Granulocytes %, Automated 0.5 0.0 - 0.9 %    Lymphocytes % 7.8 13.0 - 44.0 %    Monocytes % 6.8 2.0 - 10.0 %    Eosinophils % 1.2 0.0 - 6.0 %    Basophils % 0.3 0.0 - 2.0 %    Neutrophils Absolute 8.33 (H) 1.60 - 5.50 x10*3/uL    Immature Granulocytes Absolute, Automated 0.05 0.00 - 0.50 x10*3/uL    Lymphocytes Absolute 0.78 (L) 0.80 - 3.00 x10*3/uL    Monocytes Absolute 0.68 0.05 - 0.80 x10*3/uL    Eosinophils Absolute 0.12 0.00 - 0.40 x10*3/uL    Basophils Absolute 0.03 0.00 - 0.10 x10*3/uL   Basic Metabolic Panel    Collection Time: 09/19/24  6:28 AM   Result Value Ref Range    Glucose 102 (H) 74 - 99 mg/dL    Sodium 140 136 - 145 mmol/L    Potassium 4.5 3.5 - 5.3 mmol/L    Chloride 109 (H) 98 - 107 mmol/L    Bicarbonate 23 21 - 32 mmol/L    Anion Gap 13 10 - 20 mmol/L    Urea Nitrogen 39 (H) 6 - 23 mg/dL    Creatinine 1.48 (H) 0.50 - 1.30 mg/dL    eGFR 46 (L) >60 mL/min/1.73m*2    Calcium 8.7 8.6 - 10.3 mg/dL   POCT GLUCOSE    Collection Time: 09/19/24  7:52 AM   Result Value Ref Range    POCT Glucose 98 74 - 99 mg/dL   POCT GLUCOSE    Collection Time: 09/19/24  1:51 PM   Result Value Ref Range    POCT Glucose 96 74 - 99 mg/dL   POCT GLUCOSE    Collection Time: 09/19/24  4:15 PM   Result Value Ref Range    POCT Glucose 109 (H) 74 - 99 mg/dL   POCT GLUCOSE    Collection Time: 09/19/24  7:49 PM   Result Value Ref Range    POCT Glucose 141 (H) 74 - 99 mg/dL   Comprehensive metabolic panel     Collection Time: 09/20/24  6:32 AM   Result Value Ref Range    Glucose 97 74 - 99 mg/dL    Sodium 140 136 - 145 mmol/L    Potassium 4.5 3.5 - 5.3 mmol/L    Chloride 110 (H) 98 - 107 mmol/L    Bicarbonate 19 (L) 21 - 32 mmol/L    Anion Gap 16 10 - 20 mmol/L    Urea Nitrogen 35 (H) 6 - 23 mg/dL    Creatinine 1.42 (H) 0.50 - 1.30 mg/dL    eGFR 48 (L) >60 mL/min/1.73m*2    Calcium 8.7 8.6 - 10.3 mg/dL    Albumin 3.1 (L) 3.4 - 5.0 g/dL    Alkaline Phosphatase 104 33 - 136 U/L    Total Protein 5.9 (L) 6.4 - 8.2 g/dL    AST 33 9 - 39 U/L    Bilirubin, Total 0.7 0.0 - 1.2 mg/dL    ALT 44 10 - 52 U/L   Reticulocytes    Collection Time: 09/20/24  6:32 AM   Result Value Ref Range    Retic % 3.5 (H) 0.5 - 2.0 %    Retic Absolute 0.089 0.017 - 0.110 x10*6/uL    Reticulocyte Hemoglobin 30 28 - 38 pg    Immature Retic fraction 23.3 (H) <=16.0 %   POCT GLUCOSE    Collection Time: 09/20/24  7:58 AM   Result Value Ref Range    POCT Glucose 112 (H) 74 - 99 mg/dL   Haptoglobin    Collection Time: 09/20/24  9:28 AM   Result Value Ref Range    Haptoglobin 245 (H) 30 - 200 mg/dL   POCT GLUCOSE    Collection Time: 09/20/24 12:06 PM   Result Value Ref Range    POCT Glucose 124 (H) 74 - 99 mg/dL   POCT GLUCOSE    Collection Time: 09/20/24  5:15 PM   Result Value Ref Range    POCT Glucose 126 (H) 74 - 99 mg/dL   POCT GLUCOSE    Collection Time: 09/20/24  7:41 PM   Result Value Ref Range    POCT Glucose 137 (H) 74 - 99 mg/dL   POCT GLUCOSE    Collection Time: 09/21/24  7:33 AM   Result Value Ref Range    POCT Glucose 115 (H) 74 - 99 mg/dL          Assessment:  This is an 82 yo Male with a PMH of  HTN, obesity, multiple SBO's, prostate CA, colon CA s/p colectomy, 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  GI was consulted for GIB once starting Eliquis.  Colonoscopy  was done on 9/19 showing 1-15 mm sessile and adenomatous-appearing polyp in the ascending colon with no bleeding which was removed and clipped. VCE was placed a few days ago, appeared stuck in stomach for 13 hours and was incomplete due to this.  Xray showed bowel-gas pattern nonobstructive with mild colonic distention and the video capsule overlies the right lower quadrant of the abdomen potentially distal small bowel.        Plan  1.)  GIB-Trend Hgb, monitor for overt bleeding, transfuse as needed.  Please get KUB in 2 days.  Slow transit GI.      Discussed patient with Dr. Anatoliy CARBONE spent 30 minutes in the professional and overall care of this patient.      09/21/24 at 8:24 AM - TENZIN Bernstein-CNP

## 2024-09-21 NOTE — CARE PLAN
The patient's goals for the shift include      Problem: Safety - Adult  Goal: Free from fall injury  9/21/2024 0800 by Tyron Orozco RN  Outcome: Progressing  9/21/2024 0740 by Tyron Orozco RN  Outcome: Progressing  Flowsheets (Taken 9/21/2024 0740)  Free from fall injury: Instruct family/caregiver on patient safety     Problem: Skin  Goal: Prevent/minimize sheer/friction injuries  Outcome: Progressing  Flowsheets (Taken 9/21/2024 0800)  Prevent/minimize sheer/friction injuries:   HOB 30 degrees or less   Turn/reposition every 2 hours/use positioning/transfer devices     The clinical goals for the shift include pt will remain hemodynamically stable throughout shift

## 2024-09-22 VITALS
BODY MASS INDEX: 30.37 KG/M2 | WEIGHT: 205.03 LBS | HEIGHT: 69 IN | OXYGEN SATURATION: 98 % | HEART RATE: 68 BPM | TEMPERATURE: 98.4 F | RESPIRATION RATE: 18 BRPM | SYSTOLIC BLOOD PRESSURE: 132 MMHG | DIASTOLIC BLOOD PRESSURE: 59 MMHG

## 2024-09-22 LAB
GLUCOSE BLD MANUAL STRIP-MCNC: 120 MG/DL (ref 74–99)
GLUCOSE BLD MANUAL STRIP-MCNC: 121 MG/DL (ref 74–99)
GLUCOSE BLD MANUAL STRIP-MCNC: 150 MG/DL (ref 74–99)
GLUCOSE BLD MANUAL STRIP-MCNC: 160 MG/DL (ref 74–99)

## 2024-09-22 PROCEDURE — 1100000001 HC PRIVATE ROOM DAILY

## 2024-09-22 PROCEDURE — 2500000002 HC RX 250 W HCPCS SELF ADMINISTERED DRUGS (ALT 637 FOR MEDICARE OP, ALT 636 FOR OP/ED): Performed by: INTERNAL MEDICINE

## 2024-09-22 PROCEDURE — 2500000001 HC RX 250 WO HCPCS SELF ADMINISTERED DRUGS (ALT 637 FOR MEDICARE OP): Performed by: INTERNAL MEDICINE

## 2024-09-22 PROCEDURE — 82947 ASSAY GLUCOSE BLOOD QUANT: CPT

## 2024-09-22 PROCEDURE — 2500000004 HC RX 250 GENERAL PHARMACY W/ HCPCS (ALT 636 FOR OP/ED): Performed by: NURSE PRACTITIONER

## 2024-09-22 ASSESSMENT — COGNITIVE AND FUNCTIONAL STATUS - GENERAL
DAILY ACTIVITIY SCORE: 12
MOBILITY SCORE: 12
MOBILITY SCORE: 12
MOVING FROM LYING ON BACK TO SITTING ON SIDE OF FLAT BED WITH BEDRAILS: A LOT
DRESSING REGULAR LOWER BODY CLOTHING: A LOT
CLIMB 3 TO 5 STEPS WITH RAILING: A LOT
EATING MEALS: A LOT
PERSONAL GROOMING: A LOT
MOVING FROM LYING ON BACK TO SITTING ON SIDE OF FLAT BED WITH BEDRAILS: A LOT
DRESSING REGULAR UPPER BODY CLOTHING: A LOT
STANDING UP FROM CHAIR USING ARMS: A LOT
TURNING FROM BACK TO SIDE WHILE IN FLAT BAD: A LOT
TURNING FROM BACK TO SIDE WHILE IN FLAT BAD: A LOT
MOVING TO AND FROM BED TO CHAIR: A LOT
HELP NEEDED FOR BATHING: A LOT
EATING MEALS: A LOT
CLIMB 3 TO 5 STEPS WITH RAILING: A LOT
DRESSING REGULAR UPPER BODY CLOTHING: A LOT
WALKING IN HOSPITAL ROOM: A LOT
WALKING IN HOSPITAL ROOM: A LOT
DAILY ACTIVITIY SCORE: 12
DRESSING REGULAR LOWER BODY CLOTHING: A LOT
STANDING UP FROM CHAIR USING ARMS: A LOT
PERSONAL GROOMING: A LOT
TOILETING: A LOT
TOILETING: A LOT
HELP NEEDED FOR BATHING: A LOT
MOVING TO AND FROM BED TO CHAIR: A LOT

## 2024-09-22 ASSESSMENT — PAIN SCALES - GENERAL
PAINLEVEL_OUTOF10: 0 - NO PAIN
PAINLEVEL_OUTOF10: 0 - NO PAIN

## 2024-09-22 ASSESSMENT — PAIN SCALES - WONG BAKER: WONGBAKER_NUMERICALRESPONSE: NO HURT

## 2024-09-22 NOTE — CARE PLAN
The patient's goals for the shift include      The clinical goals for the shift include pt. will remain hemodynamically stable throughout shift

## 2024-09-22 NOTE — PROGRESS NOTES
Message sent to [facility] to check on bed availability, [facility] requested that team follow up tomorrow on bed availability. Requested for PT/OT to work with patient tomorrow for pre-cert.

## 2024-09-22 NOTE — PROGRESS NOTES
Regulo Marques is a 83 y.o. male on day 6 of admission presenting with Anemia, unspecified type.      Subjective   Patient seen and examined by me.  Patient IS resting in her chair.  This patient is being covered by me for Dr. Casarez and is awaiting placement to [facility] hopefully tomorrow.  Vitals and labs noted.  Patient denies any cough or shortness of breath and remains on room air.  He is afebrile.  Patient states that he hopes to get therapy at the [facility] and plans on going back to his assisted living.       Objective     Last Recorded Vitals  /55 (Patient Position: Sitting)   Pulse 51   Temp 36.8 °C (98.2 °F)   Resp 18   Wt 93 kg (205 lb 0.4 oz)   SpO2 99%   Intake/Output last 3 Shifts:    Intake/Output Summary (Last 24 hours) at 9/22/2024 1439  Last data filed at 9/22/2024 0319  Gross per 24 hour   Intake --   Output 400 ml   Net -400 ml       Admission Weight  Weight: 93 kg (205 lb 0.4 oz) (09/18/24 2025)    Daily Weight  09/19/24 : 93 kg (205 lb 0.4 oz)    Image Results  XR abdomen 1 view  Narrative: Interpreted By:  Manjit Nuñez,   STUDY:  XR ABDOMEN 1 VIEW      INDICATION:  Signs/Symptoms:To evaluate passage of video capsule.  Was stuck in  stomach for 13 hours so want to make sure it's passed..      COMPARISON:  September 17      ACCESSION NUMBER(S):  [id number]      ORDERING CLINICIAN:  BRAYAN LAGUERRE      FINDINGS:  Bowel-gas pattern nonobstructive with mild colonic distention.      The video capsule overlies the right lower quadrant of the abdomen,  potentially distal small bowel.      Impression: Bowel-gas pattern nonobstructive with mild colonic distention.      The video capsule overlies the right lower quadrant of the abdomen,  potentially distal small bowel.      Signed by: Manjit Nuñez 9/21/2024 11:48 AM  Dictation workstation:   [id number]      Physical Exam  Vitals and nursing note reviewed.   Constitutional:       General: He is not in acute distress.     Appearance:  Normal appearance. He is obese. He is not ill-appearing or toxic-appearing.   HENT:      Head: Normocephalic and atraumatic.      Right Ear: Tympanic membrane and ear canal normal. There is no impacted cerumen.      Left Ear: Tympanic membrane, ear canal and external ear normal.      Nose: Nose normal. No congestion or rhinorrhea.      Mouth/Throat:      Pharynx: Oropharynx is clear. No oropharyngeal exudate or posterior oropharyngeal erythema.   Eyes:      General: No scleral icterus.        Right eye: No discharge.         Left eye: No discharge.      Extraocular Movements: Extraocular movements intact.      Conjunctiva/sclera: Conjunctivae normal.      Pupils: Pupils are equal, round, and reactive to light.   Neck:      Vascular: No carotid bruit.   Cardiovascular:      Rate and Rhythm: Normal rate. Rhythm irregular.      Pulses: Normal pulses.      Heart sounds: Normal heart sounds, S1 normal and S2 normal. No murmur heard.     No gallop.      Comments: Bilateral lower leg trace to 1+ edema.  Pulmonary:      Effort: Pulmonary effort is normal. No respiratory distress.      Breath sounds: Normal breath sounds. No wheezing, rhonchi or rales.   Abdominal:      General: Abdomen is flat. Bowel sounds are normal. There is no distension.      Palpations: Abdomen is soft. There is no mass.      Tenderness: There is no abdominal tenderness. There is no right CVA tenderness, left CVA tenderness, guarding or rebound.      Hernia: No hernia is present.   Musculoskeletal:         General: No swelling or tenderness. Normal range of motion.      Cervical back: Normal range of motion and neck supple. No rigidity.   Lymphadenopathy:      Cervical: No cervical adenopathy.   Skin:     General: Skin is warm.      Coloration: Skin is not jaundiced.      Findings: No erythema or rash.   Neurological:      General: No focal deficit present.      Mental Status: He is alert and oriented to person, place, and time. Mental status is at  baseline.      Sensory: No sensory deficit.      Motor: No weakness.      Gait: Gait normal.      Deep Tendon Reflexes: Reflexes normal.   Psychiatric:         Mood and Affect: Mood normal.         Thought Content: Thought content normal.         Judgment: Judgment normal.         Relevant Results    Current Facility-Administered Medications:     acetaminophen (Tylenol) oral liquid 650 mg, 650 mg, oral, q4h PRN, Paul Casarez MD    acetaminophen (Tylenol) tablet 650 mg, 650 mg, oral, q6h PRN, Paul Casarez MD    aspirin chewable tablet 81 mg, 81 mg, oral, Daily, Paul Casarez MD, 81 mg at 09/22/24 0906    atorvastatin (Lipitor) tablet 80 mg, 80 mg, oral, Nightly, Paul Casarez MD, 80 mg at 09/21/24 2029    bisacodyl (Dulcolax) suppository 10 mg, 10 mg, rectal, Daily PRN, Paul Casarez MD    dextrose 50 % injection 12.5 g, 12.5 g, intravenous, q15 min PRN, TENZIN Molina-CNP    dextrose 50 % injection 25 g, 25 g, intravenous, q15 min PRN, TENZIN Molina-CNP    glucagon (Glucagen) injection 1 mg, 1 mg, intramuscular, q15 min PRN, TENZIN Molina-CNP    glucagon (Glucagen) injection 1 mg, 1 mg, intramuscular, q15 min PRN, TENZIN Molina-CNP    insulin lispro (HumaLOG) injection 0-5 Units, 0-5 Units, subcutaneous, TID, Paul Casarez MD    polyethylene glycol (Glycolax, Miralax) packet 17 g, 17 g, oral, Daily, TENZIN Bernstein-CNP, 17 g at 09/22/24 0906    sennosides (Senokot) tablet 8.6 mg, 1 tablet, oral, Nightly, Paul Casarez MD, 8.6 mg at 09/21/24 2030    tamsulosin (Flomax) 24 hr capsule 0.8 mg, 0.8 mg, oral, Nightly, Paul Casarez MD, 0.8 mg at 09/21/24 2031   Results for orders placed or performed during the hospital encounter of 09/16/24 (from the past 96 hour(s))   POCT GLUCOSE   Result Value Ref Range    POCT Glucose 101 (H) 74 - 99 mg/dL   CBC and Auto Differential   Result Value Ref Range    WBC 10.0 4.4 - 11.3 x10*3/uL     nRBC 0.0 0.0 - 0.0 /100 WBCs    RBC 2.49 (L) 4.50 - 5.90 x10*6/uL    Hemoglobin 7.6 (L) 13.5 - 17.5 g/dL    Hematocrit 24.5 (L) 41.0 - 52.0 %    MCV 98 80 - 100 fL    MCH 30.5 26.0 - 34.0 pg    MCHC 31.0 (L) 32.0 - 36.0 g/dL    RDW 16.5 (H) 11.5 - 14.5 %    Platelets 227 150 - 450 x10*3/uL    Neutrophils % 83.4 40.0 - 80.0 %    Immature Granulocytes %, Automated 0.5 0.0 - 0.9 %    Lymphocytes % 7.8 13.0 - 44.0 %    Monocytes % 6.8 2.0 - 10.0 %    Eosinophils % 1.2 0.0 - 6.0 %    Basophils % 0.3 0.0 - 2.0 %    Neutrophils Absolute 8.33 (H) 1.60 - 5.50 x10*3/uL    Immature Granulocytes Absolute, Automated 0.05 0.00 - 0.50 x10*3/uL    Lymphocytes Absolute 0.78 (L) 0.80 - 3.00 x10*3/uL    Monocytes Absolute 0.68 0.05 - 0.80 x10*3/uL    Eosinophils Absolute 0.12 0.00 - 0.40 x10*3/uL    Basophils Absolute 0.03 0.00 - 0.10 x10*3/uL   Basic Metabolic Panel   Result Value Ref Range    Glucose 102 (H) 74 - 99 mg/dL    Sodium 140 136 - 145 mmol/L    Potassium 4.5 3.5 - 5.3 mmol/L    Chloride 109 (H) 98 - 107 mmol/L    Bicarbonate 23 21 - 32 mmol/L    Anion Gap 13 10 - 20 mmol/L    Urea Nitrogen 39 (H) 6 - 23 mg/dL    Creatinine 1.48 (H) 0.50 - 1.30 mg/dL    eGFR 46 (L) >60 mL/min/1.73m*2    Calcium 8.7 8.6 - 10.3 mg/dL   POCT GLUCOSE   Result Value Ref Range    POCT Glucose 98 74 - 99 mg/dL   POCT GLUCOSE   Result Value Ref Range    POCT Glucose 96 74 - 99 mg/dL   POCT GLUCOSE   Result Value Ref Range    POCT Glucose 109 (H) 74 - 99 mg/dL   POCT GLUCOSE   Result Value Ref Range    POCT Glucose 141 (H) 74 - 99 mg/dL   Comprehensive metabolic panel   Result Value Ref Range    Glucose 97 74 - 99 mg/dL    Sodium 140 136 - 145 mmol/L    Potassium 4.5 3.5 - 5.3 mmol/L    Chloride 110 (H) 98 - 107 mmol/L    Bicarbonate 19 (L) 21 - 32 mmol/L    Anion Gap 16 10 - 20 mmol/L    Urea Nitrogen 35 (H) 6 - 23 mg/dL    Creatinine 1.42 (H) 0.50 - 1.30 mg/dL    eGFR 48 (L) >60 mL/min/1.73m*2    Calcium 8.7 8.6 - 10.3 mg/dL    Albumin 3.1 (L)  3.4 - 5.0 g/dL    Alkaline Phosphatase 104 33 - 136 U/L    Total Protein 5.9 (L) 6.4 - 8.2 g/dL    AST 33 9 - 39 U/L    Bilirubin, Total 0.7 0.0 - 1.2 mg/dL    ALT 44 10 - 52 U/L   Reticulocytes   Result Value Ref Range    Retic % 3.5 (H) 0.5 - 2.0 %    Retic Absolute 0.089 0.017 - 0.110 x10*6/uL    Reticulocyte Hemoglobin 30 28 - 38 pg    Immature Retic fraction 23.3 (H) <=16.0 %   POCT GLUCOSE   Result Value Ref Range    POCT Glucose 112 (H) 74 - 99 mg/dL   Haptoglobin   Result Value Ref Range    Haptoglobin 245 (H) 30 - 200 mg/dL   POCT GLUCOSE   Result Value Ref Range    POCT Glucose 124 (H) 74 - 99 mg/dL   POCT GLUCOSE   Result Value Ref Range    POCT Glucose 126 (H) 74 - 99 mg/dL   POCT GLUCOSE   Result Value Ref Range    POCT Glucose 137 (H) 74 - 99 mg/dL   POCT GLUCOSE   Result Value Ref Range    POCT Glucose 115 (H) 74 - 99 mg/dL   POCT GLUCOSE   Result Value Ref Range    POCT Glucose 153 (H) 74 - 99 mg/dL   POCT GLUCOSE   Result Value Ref Range    POCT Glucose 157 (H) 74 - 99 mg/dL   POCT GLUCOSE   Result Value Ref Range    POCT Glucose 161 (H) 74 - 99 mg/dL   POCT GLUCOSE   Result Value Ref Range    POCT Glucose 120 (H) 74 - 99 mg/dL   POCT GLUCOSE   Result Value Ref Range    POCT Glucose 160 (H) 74 - 99 mg/dL             Assessment/Plan      #1/Recurrent severe anemia/history of GI bleed/s/p transfusion/history of paroxysmal atrial fibrillation and his oral anticoagulation is on hold.  Capsule endoscopy was not successful due to technical issues.  Hemoglobin hematocrit is 7.6 and 24.5 today.  Patient remains on aspirin 81 mg p.o. daily and no further Eliquis given his GI bleeding.  Continue to monitor CBC with differential at the ECF closely.  #2/history of CHF/hypertension/paroxysmal atrial fibrillation prior CVA and remains in controlled ventricular response.  3./History of dyslipidemia and remains stable on his statin treatment./History of    #4/chronic kidney disease stage III and renal functions  were stable 9/20/2024 with BUN of 35 creatinine 1.4.  #5/generalized weakness deconditioning and gait instability and patient awaiting placement in SNF/acute rehab at [facility].  Discharge to the facility hopefully tomorrow once arrangements have been made.  6./Medications reconciled.  Labs and vitals noted.  Total time spent 20 minutes/time spent on patient interaction counseling and assessment and plan and documentation.  Assessment & Plan  Anemia, unspecified type          #1/malnutrition Diagnosis Status: New  Malnutrition Diagnosis: Moderate malnutrition related to acute disease or injury  As Evidenced by: mild-moderate fat wasting; mild-moderate muscle wasting; average intake of 53% of meals x >1 week  I agree with the dietitian's malnutrition diagnosis.         Marcos Adam MD

## 2024-09-23 LAB
GLUCOSE BLD MANUAL STRIP-MCNC: 140 MG/DL (ref 74–99)
GLUCOSE BLD MANUAL STRIP-MCNC: 145 MG/DL (ref 74–99)
GLUCOSE BLD MANUAL STRIP-MCNC: 157 MG/DL (ref 74–99)

## 2024-09-23 PROCEDURE — 2500000004 HC RX 250 GENERAL PHARMACY W/ HCPCS (ALT 636 FOR OP/ED): Performed by: NURSE PRACTITIONER

## 2024-09-23 PROCEDURE — 2500000001 HC RX 250 WO HCPCS SELF ADMINISTERED DRUGS (ALT 637 FOR MEDICARE OP): Performed by: INTERNAL MEDICINE

## 2024-09-23 PROCEDURE — 82947 ASSAY GLUCOSE BLOOD QUANT: CPT

## 2024-09-23 PROCEDURE — 97535 SELF CARE MNGMENT TRAINING: CPT | Mod: CO,GO

## 2024-09-23 PROCEDURE — 1100000001 HC PRIVATE ROOM DAILY

## 2024-09-23 PROCEDURE — 2500000002 HC RX 250 W HCPCS SELF ADMINISTERED DRUGS (ALT 637 FOR MEDICARE OP, ALT 636 FOR OP/ED): Performed by: INTERNAL MEDICINE

## 2024-09-23 PROCEDURE — 97116 GAIT TRAINING THERAPY: CPT | Mod: GP,CQ

## 2024-09-23 RX ORDER — POLYETHYLENE GLYCOL 3350 17 G/17G
17 POWDER, FOR SOLUTION ORAL DAILY
Qty: 30 PACKET | Refills: 0 | Status: SHIPPED | OUTPATIENT
Start: 2024-09-24

## 2024-09-23 RX ORDER — NAPROXEN SODIUM 220 MG/1
81 TABLET, FILM COATED ORAL DAILY
Qty: 30 TABLET | Refills: 0 | Status: SHIPPED | OUTPATIENT
Start: 2024-09-24

## 2024-09-23 ASSESSMENT — COGNITIVE AND FUNCTIONAL STATUS - GENERAL
EATING MEALS: A LOT
STANDING UP FROM CHAIR USING ARMS: A LITTLE
WALKING IN HOSPITAL ROOM: A LOT
MOVING TO AND FROM BED TO CHAIR: A LITTLE
DRESSING REGULAR LOWER BODY CLOTHING: A LOT
DAILY ACTIVITIY SCORE: 12
WALKING IN HOSPITAL ROOM: A LOT
CLIMB 3 TO 5 STEPS WITH RAILING: TOTAL
DRESSING REGULAR LOWER BODY CLOTHING: A LOT
DRESSING REGULAR UPPER BODY CLOTHING: A LITTLE
PERSONAL GROOMING: A LOT
TURNING FROM BACK TO SIDE WHILE IN FLAT BAD: A LOT
HELP NEEDED FOR BATHING: A LOT
CLIMB 3 TO 5 STEPS WITH RAILING: A LOT
MOVING FROM LYING ON BACK TO SITTING ON SIDE OF FLAT BED WITH BEDRAILS: A LITTLE
DRESSING REGULAR UPPER BODY CLOTHING: A LOT
MOVING FROM LYING ON BACK TO SITTING ON SIDE OF FLAT BED WITH BEDRAILS: A LOT
TOILETING: A LOT
TOILETING: A LOT
MOBILITY SCORE: 12
STANDING UP FROM CHAIR USING ARMS: A LOT
MOVING TO AND FROM BED TO CHAIR: A LOT
HELP NEEDED FOR BATHING: A LOT
MOBILITY SCORE: 15
PERSONAL GROOMING: A LITTLE
TURNING FROM BACK TO SIDE WHILE IN FLAT BAD: A LITTLE
DAILY ACTIVITIY SCORE: 16

## 2024-09-23 ASSESSMENT — PAIN - FUNCTIONAL ASSESSMENT: PAIN_FUNCTIONAL_ASSESSMENT: 0-10

## 2024-09-23 ASSESSMENT — PAIN SCALES - GENERAL
PAINLEVEL_OUTOF10: 0 - NO PAIN

## 2024-09-23 ASSESSMENT — ACTIVITIES OF DAILY LIVING (ADL)
HOME_MANAGEMENT_TIME_ENTRY: 15
BATHING_LEVEL_OF_ASSISTANCE: MODERATE ASSISTANCE

## 2024-09-23 NOTE — PROGRESS NOTES
09/23/24 1520   Discharge Planning   Living Arrangements Spouse/significant other   Support Systems Spouse/significant other;Children   Type of Residence Private residence   Expected Discharge Disposition SNF   Does the patient need discharge transport arranged? Yes   RoundTrip coordination needed? Yes     DSC team tasked with starting precert to [facility] as patient has discharge orders.  Lurdes GARCIA TCC

## 2024-09-23 NOTE — PROGRESS NOTES
"Nutrition Follow Up Assessment:   Nutrition Assessment         Patient is a 83 y.o. male presenting with anemia.       Nutrition History:  Energy Intake: Fair 50-75 %  Food and Nutrient History: Average intake of meals documented since last assessment is 52%. Eating all of the magic cup supplements and asking for more. Asking about ensure - discussed how this is not something our facility will thicken to the appropriate consistency so he is getting magic cup instead. Asking about other meal options available. Denies N/V/D/C. Is trying not to eat a lot of salt.  Food Allergies/Intolerances:  None  GI Symptoms: None  Oral Problems: Swallowing difficulty       Anthropometrics:  Height: 175.3 cm (5' 9.02\")   Weight: 93 kg (205 lb 0.4 oz)   BMI (Calculated): 30.26  IBW/kg (Dietitian Calculated): 72.7 kg          Weight History:     Weight Change %:  Weight History / % Weight Change: No new wt to assess    Nutrition Focused Physical Exam Findings:  defer: completed 9/18/24 - see previous note  Subcutaneous Fat Loss:   Orbital Fat Pads: Defer  Muscle Wasting:  Temporalis: Defer  Edema:  Edema: +1 trace  Edema Location: BLE per nursing assessment  Physical Findings:  Skin: Negative    Nutrition Significant Labs:    Reviewed   Nutrition Specific Medications:  Reviewed     I/O:   Last BM Date: 09/21/24; Stool Appearance: Watery (09/19/24 0431)    Dietary Orders (From admission, onward)       Start     Ordered    09/23/24 1241  Oral nutritional supplements  Until discontinued        Comments: Chocolate  TWO ON EACH TRAY   Question Answer Comment   Deliver with All meals    Select supplement: Magic Cup        09/23/24 1241    09/20/24 1612  Adult diet Regular; Soft and bite sized 6; Moderately thick 3  Diet effective now        Question Answer Comment   Diet type Regular    Texture Soft and bite sized 6    Fluid consistency Moderately thick 3        09/20/24 1611                     Estimated Needs:   Total Energy Estimated " Needs (kCal): 1818 kCal  Method for Estimating Needs: 25 kcal/kg IBW  Total Protein Estimated Needs (g): 73 g  Method for Estimating Needs: 1 g/kg IBW  Total Fluid Estimated Needs (mL): 1818 mL  Method for Estimating Needs: 1 ml/kcal or per MD        Nutrition Diagnosis   Malnutrition Diagnosis  Patient has Malnutrition Diagnosis: Yes  Diagnosis Status: Ongoing  Malnutrition Diagnosis: Moderate malnutrition related to acute disease or injury  As Evidenced by: mild-moderate fat wasting; mild-moderate muscle wasting; average intake of 53% of meals x >1 week            Nutrition Interventions/Recommendations         Nutrition Prescription:  Individualized Nutrition Prescription Provided for : Regular diet, texture and consistency per SLP; ONS with meals        Nutrition Interventions:   Interventions: Meals and snacks, Medical food supplement  Meals and Snacks: Texture-modified diet, Fluid-modified diet  Goal: Consumes 3 meals per day  Medical Food Supplement: Commercial beverage  Goal: Magic cup 6x/day (290 kcal, 9 g protein per serving).         Nutrition Education:   Patient with no diet related questions at this time.         Nutrition Monitoring and Evaluation   Food/Nutrient Related History Monitoring  Monitoring and Evaluation Plan: Energy intake, Amount of food, Fluid intake  Energy Intake: Estimated energy intake  Criteria: Pt meets >75% of estimated energy needs - improving with addition of ONS  Fluid Intake: Estimated fluid intake  Criteria: Meets >75% of estimated fluid needs - LYNDA  Amount of Food: Estimated amout of food, Medical food intake  Criteria: Pt consumes >75% of meals and supplements - improving    Body Composition/Growth/Weight History  Monitoring and Evaluation Plan: Weight  Weight: Measured weight  Criteria: Maintains stable weight - LYNDA    Biochemical Data, Medical Tests and Procedures  Monitoring and Evaluation Plan: Glucose/endocrine profile, Electrolyte/renal panel  Electrolyte and Renal  Panel: Sodium, Potassium, Phosphorus  Criteria: Electrolytes WNL - met  Glucose/Endocrine Profile: Glucose, casual  Criteria: BG within desirable range - met    Nutrition Focused Physical Findings  Monitoring and Evaluation Plan: Digestive System  Digestive System: Nausea  Criteria: Improvement in GI function/symptoms - met         Time Spent (min): 30 minutes

## 2024-09-23 NOTE — PROGRESS NOTES
Occupational Therapy    OT Treatment    Patient Name: Regulo Marques  MRN: [id number]  Department: [facility]  Room: [address] [place] [address]  Today's Date: 9/23/2024  Time Calculation  Start Time: 0912  Stop Time: 0937  Time Calculation (min): 25 min        Assessment:  End of Session Patient Position: Up in chair, Alarm on     Plan:  Treatment Interventions: ADL retraining, Patient/family training, Equipment evaluation/education, Neuromuscular reeducation, Functional transfer training, Compensatory technique education  OT Frequency: 3 times per week  OT Discharge Recommendations: Moderate intensity level of continued care  OT - OK to Discharge: Yes (to next level of care when medically cleared by phyisician/medical team)  Treatment Interventions: ADL retraining, Patient/family training, Equipment evaluation/education, Neuromuscular reeducation, Functional transfer training, Compensatory technique education    Subjective   Previous Visit Info:  OT Last Visit  OT Received On: 09/23/24  General:  General  Co-Treatment: PT  Co-Treatment Reason: maximize safety and functional mobility  Prior to Session Communication: Bedside nurse  Patient Position Received: Up in chair, Alarm off, not on at start of session  General Comment: Patient pleasant and agreeable to therapy.  Precautions:  Hearing/Visual Limitations: [place]  Medical Precautions: Fall precautions        Objective         Activities of Daily Living: Grooming  Grooming Level of Assistance: Setup  Grooming Where Assessed: Chair  Grooming Comments: wash face    UE Bathing  UE Bathing Level of Assistance: Close supervision  UE Bathing Where Assessed:  (chair)    LE Bathing  LE Bathing Level of Assistance: Moderate assistance    UE Dressing  UE Dressing Level of Assistance: Close supervision  UE Dressing Where Assessed: Chair    LE Dressing  LE Dressing: Yes  Pants Level of Assistance: Moderate assistance  Adult Briefs Level of Assistance: Maximum assistance  LE Dressing Where Assessed:  Chair    Toileting  Toileting Level of Assistance: Moderate assistance  Functional Standing Tolerance:  Time: 5:00 standing at FWW  Bed Mobility/Transfers:      Transfers  Transfer: Yes  Transfer 1  Technique 1: Sit to stand, Stand to sit  Transfer Device 1: Walker  Transfer Level of Assistance 1: Contact guard    Toilet Transfers  Toilet Transfer From: Rolling walker  Toilet Transfer Type: To and from  Toilet Transfer to: Standard bedside commode  Toilet Transfer Technique: Ambulating  Toilet Transfers: Minimal assistance    Functional Mobility:  Functional Mobility  Functional Mobility Performed: Yes  Functional Mobility 1  Device 1: Rolling walker  Assistance 1: Moderate assistance  Comments 1: pt able to ambulate in room and out into hallway. pt still required mod to max vc for items on L side      Outcome Measures:[facility] Daily Activity  Putting on and taking off regular lower body clothing: A lot  Bathing (including washing, rinsing, drying): A lot  Putting on and taking off regular upper body clothing: A little  Toileting, which includes using toilet, bedpan or urinal: A lot  Taking care of personal grooming such as brushing teeth: A little  Eating Meals: None  Daily Activity - Total Score: 16        Education Documentation  Body Mechanics, taught by SHAWN Gaming at 9/23/2024 12:04 PM.  Learner: Patient  Readiness: Acceptance  Method: Explanation  Response: Verbalizes Understanding    ADL Training, taught by SHAWN Gaming at 9/23/2024 12:04 PM.  Learner: Patient  Readiness: Acceptance  Method: Explanation  Response: Verbalizes Understanding    Education Comments  No comments found.          Goals:  Encounter Problems       Encounter Problems (Active)       OT Goals       Patient will complete upper and lower body bathing/dressing; toileting with supervision using adaptive equipment as needed  (Progressing)       Start:  09/19/24    Expected End:  10/03/24            Patient will perform bed  mobility and functional transfers safely with supervision: bed, chair, commode using DME as needed  (Progressing)       Start:  09/19/24    Expected End:  10/03/24            Patient will tolerate standing for 5 mins. and show overall good (-) standing balance during ADL's and functional transfers/mobility  (Progressing)       Start:  09/19/24    Expected End:  10/03/24            Patient will apply compensatory techniques for decrease in perception with minimal cues (Progressing)       Start:  09/19/24    Expected End:  10/03/24

## 2024-09-23 NOTE — DISCHARGE SUMMARY
Dc summ  Dx; recent r mca stroke; mild l boubacar/dysarthria; anemia/gib, afib, htn, dm, chf  Proced/op none  Pt admitted from rehab for recurrent anemia on eliquis which was once again stopped; had cscope via gi w/polypectomy and unrevealing capsule endoscopy; hgb stable after 1u prbc transfusion and otherwise medically stable for dc to snf well in advance of actual dc date; would cont 81 asa for now as he appeared to tolerate it; cond at time of dc was stable

## 2024-09-24 VITALS
RESPIRATION RATE: 18 BRPM | OXYGEN SATURATION: 100 % | HEART RATE: 49 BPM | BODY MASS INDEX: 30.37 KG/M2 | DIASTOLIC BLOOD PRESSURE: 58 MMHG | SYSTOLIC BLOOD PRESSURE: 117 MMHG | HEIGHT: 69 IN | WEIGHT: 205.03 LBS | TEMPERATURE: 96.8 F

## 2024-09-24 LAB
GLUCOSE BLD MANUAL STRIP-MCNC: 121 MG/DL (ref 74–99)
GLUCOSE BLD MANUAL STRIP-MCNC: 138 MG/DL (ref 74–99)

## 2024-09-24 PROCEDURE — 2500000001 HC RX 250 WO HCPCS SELF ADMINISTERED DRUGS (ALT 637 FOR MEDICARE OP): Performed by: INTERNAL MEDICINE

## 2024-09-24 PROCEDURE — 82947 ASSAY GLUCOSE BLOOD QUANT: CPT

## 2024-09-24 ASSESSMENT — COGNITIVE AND FUNCTIONAL STATUS - GENERAL
DRESSING REGULAR LOWER BODY CLOTHING: A LOT
MOVING TO AND FROM BED TO CHAIR: A LOT
WALKING IN HOSPITAL ROOM: A LOT
HELP NEEDED FOR BATHING: A LOT
EATING MEALS: A LOT
MOBILITY SCORE: 12
TOILETING: A LOT
DAILY ACTIVITIY SCORE: 12
MOVING FROM LYING ON BACK TO SITTING ON SIDE OF FLAT BED WITH BEDRAILS: A LOT
STANDING UP FROM CHAIR USING ARMS: A LOT
CLIMB 3 TO 5 STEPS WITH RAILING: A LOT
TURNING FROM BACK TO SIDE WHILE IN FLAT BAD: A LOT
DRESSING REGULAR UPPER BODY CLOTHING: A LOT
PERSONAL GROOMING: A LOT

## 2024-09-24 ASSESSMENT — PAIN SCALES - GENERAL: PAINLEVEL_OUTOF10: 0 - NO PAIN

## 2024-09-24 NOTE — PROGRESS NOTES
09/24/24 1608   Discharge Planning   Living Arrangements Spouse/significant other   Support Systems Spouse/significant other;Children   Expected Discharge Disposition SNF   Does the patient need discharge transport arranged? Yes   RoundTrip coordination needed? Yes   Has discharge transport been arranged? Yes   What time is the transport expected? 1700     Patient with discharge orders to [facility] and transportation scheduled at 1700.  RN, patient and family at the bedside aware.  Lurdes GARCIA TCC

## 2024-09-24 NOTE — CARE PLAN
Problem: Pain - Adult  Goal: Verbalizes/displays adequate comfort level or baseline comfort level  Outcome: Progressing     Problem: Safety - Adult  Goal: Free from fall injury  Outcome: Progressing     Problem: Discharge Planning  Goal: Discharge to home or other facility with appropriate resources  Outcome: Progressing     Problem: Chronic Conditions and Co-morbidities  Goal: Patient's chronic conditions and co-morbidity symptoms are monitored and maintained or improved  Outcome: Progressing     Problem: Skin  Goal: Participates in plan/prevention/treatment measures  Outcome: Progressing  Goal: Prevent/manage excess moisture  Outcome: Progressing  Goal: Prevent/minimize sheer/friction injuries  Outcome: Progressing  Goal: Promote/optimize nutrition  Outcome: Progressing  Flowsheets (Taken 9/23/2024 2205)  Promote/optimize nutrition:   Assist with feeding   Monitor/record intake including meals  Goal: Promote skin healing  Outcome: Progressing     Problem: Fall/Injury  Goal: Not fall by end of shift  Outcome: Progressing  Goal: Be free from injury by end of the shift  Outcome: Progressing  Goal: Verbalize understanding of personal risk factors for fall in the hospital  Outcome: Progressing  Goal: Verbalize understanding of risk factor reduction measures to prevent injury from fall in the home  Outcome: Progressing  Goal: Use assistive devices by end of the shift  Outcome: Progressing  Goal: Pace activities to prevent fatigue by end of the shift  Outcome: Progressing     Problem: Diabetes  Goal: Increase stability of blood glucose readings by end of shift  Outcome: Progressing  Goal: Maintain electrolyte levels within acceptable range throughout shift  Outcome: Progressing  Goal: Maintain glucose levels >70mg/dl to <250mg/dl throughout shift  Outcome: Progressing  Goal: No changes in neurological exam by end of shift  Outcome: Progressing  Goal: Learn about and adhere to nutrition recommendations by end of  shift  Outcome: Progressing     Problem: Nutrition  Goal: Oral intake greater than 50%  Outcome: Progressing  Goal: Consume prescribed supplement  Outcome: Progressing  Goal: Adequate PO fluid intake  Outcome: Progressing  Goal: Nutrition support goals are met within 48 hrs  Outcome: Progressing  Goal: BG  mg/dL  Outcome: Progressing  Goal: Lab values WNL  Outcome: Progressing  Goal: Electrolytes WNL  Outcome: Progressing  Goal: Promote healing  Outcome: Progressing  Goal: Maintain stable weight  Outcome: Progressing

## 2024-09-25 PROCEDURE — 93005 ELECTROCARDIOGRAM TRACING: CPT

## 2024-09-27 LAB
ATRIAL RATE: 24 BPM
LABORATORY COMMENT REPORT: NORMAL
PATH REPORT.FINAL DX SPEC: NORMAL
PATH REPORT.GROSS SPEC: NORMAL
PATH REPORT.RELEVANT HX SPEC: NORMAL
PATH REPORT.TOTAL CANCER: NORMAL
Q ONSET: 217 MS
QRS COUNT: 9 BEATS
QRS DURATION: 90 MS
QT INTERVAL: 438 MS
QTC CALCULATION(BAZETT): 419 MS
QTC FREDERICIA: 425 MS
R AXIS: -75 DEGREES
T AXIS: 75 DEGREES
T OFFSET: 436 MS
VENTRICULAR RATE: 55 BPM

## 2024-10-22 NOTE — DOCUMENTATION CLARIFICATION NOTE
"    PATIENT:               KEN RODRIGUEZ  ACCT #:                  [id number]  MRN:                       [id number]  :                       1940  ADMIT DATE:       2024 11:54 PM  DISCH DATE:        2024 3:35 PM  RESPONDING PROVIDER #:        [id number]          PROVIDER RESPONSE TEXT:    History of GI Bleed    CDI QUERY TEXT:    Clarification    Instruction:    Based on your assessment of the patient and the clinical information, please provide the requested documentation by clicking on the appropriate radio button and enter any additional information if prompted.    Question: Based on your medical judgment, can you please clarify which of these conditions is the most clinically supported    When answering this query, please exercise your independent professional judgment. The fact that a question is being asked, does not imply that any particular answer is desired or expected.    The patient's clinical indicators include:  Clinical Information: There is conflicting documentation in the medical record which requires clarification.    -The diagnosis of history of GI bleed was documented on  by Dr. Marcos Adam    -The diagnosis of anemia/gib was documented on  by Dr. Paul Casarez    Clinical Indicators:  Progress note : \"#1/Recurrent severe anemia/history of GI bleed/s/p transfusion/history of paroxysmal atrial fibrillation and his oral anticoagulation is on hold\"  \"Capsule endoscopy was not successful due to technical issues.  Hemoglobin hematocrit is 7.6 and 24.5 today.  Patient remains on aspirin 81 mg p.o. daily and no further Eliquis given his GI bleeding.  Continue to monitor CBC with differential at the ECF closely.\"    Labs: - H/HH:    6.7, 21.6 /  7.6, 24.4/  7.8, 25.2/   7.6, 24.5    GI note : \"-No signs or subjective symptoms of overt bleeding, suspect multifactorial contributions to anemia\"    GI Attestation note : \"There is low concern for GI " "bleeding.\"    Discharge Summary: \"Dx; recent r mca stroke; mild l boubacar/dysarthria; anemia/gib, afib, htn, dm, chf\"    Colonoscopy 9/19: \"One 15 mm sessile and adenomatous-appearing polyp in the ascending colon; no bleeding was identified\"    Treatment: Labs, monitoring, colonoscopy, hold Eliquis    Risk Factors: hx of GI bleed, anemia/gib, recurrent severe anemia, Eliquis on hold, low concern for GI bleeding  Options provided:  -- History of GI Bleed  -- Current GI Bleed  -- Other - I will add my own diagnosis  -- Refer to Clinical Documentation Reviewer    Query created by: Vernell Puente on 10/14/2024 7:13 AM      Electronically signed by:  SHANKAR SUGGS DO 10/22/2024 7:40 PM          "

## 2024-12-29 ENCOUNTER — APPOINTMENT (OUTPATIENT)
Dept: RADIOLOGY | Facility: HOSPITAL | Age: 84
End: 2024-12-29
Payer: MEDICARE

## 2024-12-29 ENCOUNTER — APPOINTMENT (OUTPATIENT)
Dept: CARDIOLOGY | Facility: HOSPITAL | Age: 84
End: 2024-12-29
Payer: MEDICARE

## 2024-12-29 ENCOUNTER — HOSPITAL ENCOUNTER (OUTPATIENT)
Facility: HOSPITAL | Age: 84
Setting detail: OBSERVATION
Discharge: NURSING, REHAB OR LONG-TERM CARE | End: 2025-01-03
Attending: EMERGENCY MEDICINE | Admitting: INTERNAL MEDICINE
Payer: MEDICARE

## 2024-12-29 DIAGNOSIS — N30.01 ACUTE CYSTITIS WITH HEMATURIA: ICD-10-CM

## 2024-12-29 DIAGNOSIS — G45.9 TIA (TRANSIENT ISCHEMIC ATTACK): Primary | ICD-10-CM

## 2024-12-29 LAB
ALBUMIN SERPL BCP-MCNC: 3.1 G/DL (ref 3.4–5)
ALP SERPL-CCNC: 99 U/L (ref 33–136)
ALT SERPL W P-5'-P-CCNC: 31 U/L (ref 10–52)
ANION GAP SERPL CALC-SCNC: 15 MMOL/L (ref 10–20)
AST SERPL W P-5'-P-CCNC: 32 U/L (ref 9–39)
BASOPHILS # BLD AUTO: 0.04 X10*3/UL (ref 0–0.1)
BASOPHILS NFR BLD AUTO: 0.5 %
BILIRUB SERPL-MCNC: 0.4 MG/DL (ref 0–1.2)
BUN SERPL-MCNC: 52 MG/DL (ref 6–23)
BURR CELLS BLD QL SMEAR: NORMAL
CALCIUM SERPL-MCNC: 9.1 MG/DL (ref 8.6–10.3)
CHLORIDE SERPL-SCNC: 97 MMOL/L (ref 98–107)
CO2 SERPL-SCNC: 32 MMOL/L (ref 21–32)
CREAT SERPL-MCNC: 2.52 MG/DL (ref 0.5–1.3)
DACRYOCYTES BLD QL SMEAR: NORMAL
EGFRCR SERPLBLD CKD-EPI 2021: 24 ML/MIN/1.73M*2
EOSINOPHIL # BLD AUTO: 0.23 X10*3/UL (ref 0–0.4)
EOSINOPHIL NFR BLD AUTO: 2.7 %
ERYTHROCYTE [DISTWIDTH] IN BLOOD BY AUTOMATED COUNT: 20.4 % (ref 11.5–14.5)
GLUCOSE SERPL-MCNC: 141 MG/DL (ref 74–99)
HCT VFR BLD AUTO: 25.2 % (ref 41–52)
HGB BLD-MCNC: 7.5 G/DL (ref 13.5–17.5)
HYPOCHROMIA BLD QL SMEAR: NORMAL
IMM GRANULOCYTES # BLD AUTO: 0.04 X10*3/UL (ref 0–0.5)
IMM GRANULOCYTES NFR BLD AUTO: 0.5 % (ref 0–0.9)
LYMPHOCYTES # BLD AUTO: 1.23 X10*3/UL (ref 0.8–3)
LYMPHOCYTES NFR BLD AUTO: 14.7 %
MAGNESIUM SERPL-MCNC: 1.85 MG/DL (ref 1.6–2.4)
MCH RBC QN AUTO: 24.7 PG (ref 26–34)
MCHC RBC AUTO-ENTMCNC: 29.8 G/DL (ref 32–36)
MCV RBC AUTO: 83 FL (ref 80–100)
MONOCYTES # BLD AUTO: 0.58 X10*3/UL (ref 0.05–0.8)
MONOCYTES NFR BLD AUTO: 6.9 %
NEUTROPHILS # BLD AUTO: 6.25 X10*3/UL (ref 1.6–5.5)
NEUTROPHILS NFR BLD AUTO: 74.7 %
NRBC BLD-RTO: 0 /100 WBCS (ref 0–0)
OVALOCYTES BLD QL SMEAR: NORMAL
PLATELET # BLD AUTO: 276 X10*3/UL (ref 150–450)
POTASSIUM SERPL-SCNC: 3.8 MMOL/L (ref 3.5–5.3)
PROT SERPL-MCNC: 6.8 G/DL (ref 6.4–8.2)
RBC # BLD AUTO: 3.04 X10*6/UL (ref 4.5–5.9)
RBC MORPH BLD: NORMAL
SODIUM SERPL-SCNC: 140 MMOL/L (ref 136–145)
TARGETS BLD QL SMEAR: NORMAL
WBC # BLD AUTO: 8.4 X10*3/UL (ref 4.4–11.3)

## 2024-12-29 PROCEDURE — 93005 ELECTROCARDIOGRAM TRACING: CPT

## 2024-12-29 PROCEDURE — 36415 COLL VENOUS BLD VENIPUNCTURE: CPT | Performed by: EMERGENCY MEDICINE

## 2024-12-29 PROCEDURE — 87086 URINE CULTURE/COLONY COUNT: CPT | Mod: PARLAB | Performed by: EMERGENCY MEDICINE

## 2024-12-29 PROCEDURE — 71046 X-RAY EXAM CHEST 2 VIEWS: CPT | Performed by: RADIOLOGY

## 2024-12-29 PROCEDURE — 99285 EMERGENCY DEPT VISIT HI MDM: CPT | Mod: 25 | Performed by: EMERGENCY MEDICINE

## 2024-12-29 PROCEDURE — 70450 CT HEAD/BRAIN W/O DYE: CPT | Performed by: STUDENT IN AN ORGANIZED HEALTH CARE EDUCATION/TRAINING PROGRAM

## 2024-12-29 PROCEDURE — 71046 X-RAY EXAM CHEST 2 VIEWS: CPT

## 2024-12-29 PROCEDURE — 81001 URINALYSIS AUTO W/SCOPE: CPT | Performed by: EMERGENCY MEDICINE

## 2024-12-29 PROCEDURE — 83735 ASSAY OF MAGNESIUM: CPT | Performed by: EMERGENCY MEDICINE

## 2024-12-29 PROCEDURE — 80053 COMPREHEN METABOLIC PANEL: CPT | Performed by: EMERGENCY MEDICINE

## 2024-12-29 PROCEDURE — 85025 COMPLETE CBC W/AUTO DIFF WBC: CPT | Performed by: EMERGENCY MEDICINE

## 2024-12-29 PROCEDURE — 70450 CT HEAD/BRAIN W/O DYE: CPT

## 2024-12-29 ASSESSMENT — PAIN SCALES - GENERAL: PAINLEVEL_OUTOF10: 3

## 2024-12-29 ASSESSMENT — LIFESTYLE VARIABLES
TOTAL SCORE: 0
EVER FELT BAD OR GUILTY ABOUT YOUR DRINKING: NO
HAVE PEOPLE ANNOYED YOU BY CRITICIZING YOUR DRINKING: NO
EVER HAD A DRINK FIRST THING IN THE MORNING TO STEADY YOUR NERVES TO GET RID OF A HANGOVER: NO
HAVE YOU EVER FELT YOU SHOULD CUT DOWN ON YOUR DRINKING: NO

## 2024-12-29 ASSESSMENT — PAIN DESCRIPTION - PAIN TYPE: TYPE: CHRONIC PAIN

## 2024-12-29 ASSESSMENT — COLUMBIA-SUICIDE SEVERITY RATING SCALE - C-SSRS
1. IN THE PAST MONTH, HAVE YOU WISHED YOU WERE DEAD OR WISHED YOU COULD GO TO SLEEP AND NOT WAKE UP?: NO
6. HAVE YOU EVER DONE ANYTHING, STARTED TO DO ANYTHING, OR PREPARED TO DO ANYTHING TO END YOUR LIFE?: NO
2. HAVE YOU ACTUALLY HAD ANY THOUGHTS OF KILLING YOURSELF?: NO

## 2024-12-29 ASSESSMENT — PAIN - FUNCTIONAL ASSESSMENT: PAIN_FUNCTIONAL_ASSESSMENT: 0-10

## 2024-12-30 PROBLEM — G45.9 TIA (TRANSIENT ISCHEMIC ATTACK): Status: ACTIVE | Noted: 2024-12-30

## 2024-12-30 PROBLEM — G45.9 TIA ON MEDICATION: Status: ACTIVE | Noted: 2024-12-30

## 2024-12-30 LAB
ALBUMIN SERPL BCP-MCNC: 3 G/DL (ref 3.4–5)
ALP SERPL-CCNC: 95 U/L (ref 33–136)
ALT SERPL W P-5'-P-CCNC: 28 U/L (ref 10–52)
ANION GAP SERPL CALC-SCNC: 15 MMOL/L (ref 10–20)
APPEARANCE UR: ABNORMAL
AST SERPL W P-5'-P-CCNC: 26 U/L (ref 9–39)
ATRIAL RATE: 0 BPM
BASOPHILS # BLD AUTO: 0.04 X10*3/UL (ref 0–0.1)
BASOPHILS NFR BLD AUTO: 0.5 %
BILIRUB SERPL-MCNC: 0.4 MG/DL (ref 0–1.2)
BILIRUB UR STRIP.AUTO-MCNC: NEGATIVE MG/DL
BUN SERPL-MCNC: 49 MG/DL (ref 6–23)
BURR CELLS BLD QL SMEAR: NORMAL
CALCIUM SERPL-MCNC: 9 MG/DL (ref 8.6–10.3)
CHLORIDE SERPL-SCNC: 98 MMOL/L (ref 98–107)
CO2 SERPL-SCNC: 31 MMOL/L (ref 21–32)
COLOR UR: YELLOW
CREAT SERPL-MCNC: 2.44 MG/DL (ref 0.5–1.3)
DACRYOCYTES BLD QL SMEAR: NORMAL
EGFRCR SERPLBLD CKD-EPI 2021: 25 ML/MIN/1.73M*2
EOSINOPHIL # BLD AUTO: 0.21 X10*3/UL (ref 0–0.4)
EOSINOPHIL NFR BLD AUTO: 2.6 %
ERYTHROCYTE [DISTWIDTH] IN BLOOD BY AUTOMATED COUNT: 20.4 % (ref 11.5–14.5)
GLUCOSE BLD MANUAL STRIP-MCNC: 174 MG/DL (ref 74–99)
GLUCOSE SERPL-MCNC: 123 MG/DL (ref 74–99)
GLUCOSE UR STRIP.AUTO-MCNC: NORMAL MG/DL
HCT VFR BLD AUTO: 25.7 % (ref 41–52)
HGB BLD-MCNC: 7.7 G/DL (ref 13.5–17.5)
HOLD SPECIMEN: NORMAL
HYALINE CASTS #/AREA URNS AUTO: ABNORMAL /LPF
HYPOCHROMIA BLD QL SMEAR: NORMAL
IMM GRANULOCYTES # BLD AUTO: 0.04 X10*3/UL (ref 0–0.5)
IMM GRANULOCYTES NFR BLD AUTO: 0.5 % (ref 0–0.9)
KETONES UR STRIP.AUTO-MCNC: NEGATIVE MG/DL
LEUKOCYTE ESTERASE UR QL STRIP.AUTO: ABNORMAL
LYMPHOCYTES # BLD AUTO: 1.22 X10*3/UL (ref 0.8–3)
LYMPHOCYTES NFR BLD AUTO: 14.9 %
MCH RBC QN AUTO: 24.6 PG (ref 26–34)
MCHC RBC AUTO-ENTMCNC: 30 G/DL (ref 32–36)
MCV RBC AUTO: 82 FL (ref 80–100)
MONOCYTES # BLD AUTO: 0.53 X10*3/UL (ref 0.05–0.8)
MONOCYTES NFR BLD AUTO: 6.5 %
NEUTROPHILS # BLD AUTO: 6.13 X10*3/UL (ref 1.6–5.5)
NEUTROPHILS NFR BLD AUTO: 75 %
NITRITE UR QL STRIP.AUTO: NEGATIVE
NRBC BLD-RTO: 0 /100 WBCS (ref 0–0)
OVALOCYTES BLD QL SMEAR: NORMAL
P AXIS: -26 DEGREES
PH UR STRIP.AUTO: 6.5 [PH]
PLATELET # BLD AUTO: 290 X10*3/UL (ref 150–450)
POTASSIUM SERPL-SCNC: 3.6 MMOL/L (ref 3.5–5.3)
PR INTERVAL: 40 MS
PROT SERPL-MCNC: 6.6 G/DL (ref 6.4–8.2)
PROT UR STRIP.AUTO-MCNC: ABNORMAL MG/DL
Q ONSET: 251 MS
QRS COUNT: 10 BEATS
QRS DURATION: 114 MS
QT INTERVAL: 454 MS
QTC CALCULATION(BAZETT): 534 MS
QTC FREDERICIA: 506 MS
R AXIS: -79 DEGREES
RBC # BLD AUTO: 3.13 X10*6/UL (ref 4.5–5.9)
RBC # UR STRIP.AUTO: NEGATIVE /UL
RBC #/AREA URNS AUTO: ABNORMAL /HPF
RBC MORPH BLD: NORMAL
SODIUM SERPL-SCNC: 140 MMOL/L (ref 136–145)
SP GR UR STRIP.AUTO: 1.01
SQUAMOUS #/AREA URNS AUTO: ABNORMAL /HPF
T AXIS: 86 DEGREES
T OFFSET: 478 MS
TARGETS BLD QL SMEAR: NORMAL
UROBILINOGEN UR STRIP.AUTO-MCNC: NORMAL MG/DL
VENTRICULAR RATE: 83 BPM
WBC # BLD AUTO: 8.2 X10*3/UL (ref 4.4–11.3)
WBC #/AREA URNS AUTO: >50 /HPF
WBC CLUMPS #/AREA URNS AUTO: ABNORMAL /HPF

## 2024-12-30 PROCEDURE — G0378 HOSPITAL OBSERVATION PER HR: HCPCS

## 2024-12-30 PROCEDURE — 85025 COMPLETE CBC W/AUTO DIFF WBC: CPT | Performed by: INTERNAL MEDICINE

## 2024-12-30 PROCEDURE — 2500000001 HC RX 250 WO HCPCS SELF ADMINISTERED DRUGS (ALT 637 FOR MEDICARE OP): Performed by: INTERNAL MEDICINE

## 2024-12-30 PROCEDURE — 82947 ASSAY GLUCOSE BLOOD QUANT: CPT | Mod: 59

## 2024-12-30 PROCEDURE — 2500000002 HC RX 250 W HCPCS SELF ADMINISTERED DRUGS (ALT 637 FOR MEDICARE OP, ALT 636 FOR OP/ED): Performed by: INTERNAL MEDICINE

## 2024-12-30 PROCEDURE — 80053 COMPREHEN METABOLIC PANEL: CPT | Performed by: INTERNAL MEDICINE

## 2024-12-30 PROCEDURE — 2500000004 HC RX 250 GENERAL PHARMACY W/ HCPCS (ALT 636 FOR OP/ED): Performed by: EMERGENCY MEDICINE

## 2024-12-30 PROCEDURE — 94640 AIRWAY INHALATION TREATMENT: CPT

## 2024-12-30 PROCEDURE — 36415 COLL VENOUS BLD VENIPUNCTURE: CPT | Performed by: INTERNAL MEDICINE

## 2024-12-30 PROCEDURE — 2500000001 HC RX 250 WO HCPCS SELF ADMINISTERED DRUGS (ALT 637 FOR MEDICARE OP): Performed by: EMERGENCY MEDICINE

## 2024-12-30 PROCEDURE — 2500000004 HC RX 250 GENERAL PHARMACY W/ HCPCS (ALT 636 FOR OP/ED): Performed by: INTERNAL MEDICINE

## 2024-12-30 PROCEDURE — 96365 THER/PROPH/DIAG IV INF INIT: CPT

## 2024-12-30 RX ORDER — OLMESARTAN MEDOXOMIL 40 MG/1
40 TABLET ORAL DAILY
COMMUNITY
Start: 2016-05-02

## 2024-12-30 RX ORDER — LANOLIN ALCOHOL/MO/W.PET/CERES
100 CREAM (GRAM) TOPICAL DAILY
Status: DISCONTINUED | OUTPATIENT
Start: 2024-12-30 | End: 2025-01-03 | Stop reason: HOSPADM

## 2024-12-30 RX ORDER — AMLODIPINE BESYLATE 2.5 MG/1
2.5 TABLET ORAL DAILY
COMMUNITY

## 2024-12-30 RX ORDER — POLYETHYLENE GLYCOL 3350 17 G/17G
17 POWDER, FOR SOLUTION ORAL DAILY PRN
Status: DISCONTINUED | OUTPATIENT
Start: 2024-12-30 | End: 2025-01-03 | Stop reason: HOSPADM

## 2024-12-30 RX ORDER — PANTOPRAZOLE SODIUM 40 MG/1
40 TABLET, DELAYED RELEASE ORAL
Status: DISCONTINUED | OUTPATIENT
Start: 2024-12-31 | End: 2025-01-03 | Stop reason: HOSPADM

## 2024-12-30 RX ORDER — INSULIN LISPRO 100 [IU]/ML
0-5 INJECTION, SOLUTION INTRAVENOUS; SUBCUTANEOUS
Status: DISCONTINUED | OUTPATIENT
Start: 2024-12-30 | End: 2024-12-30

## 2024-12-30 RX ORDER — NAPROXEN SODIUM 220 MG/1
81 TABLET, FILM COATED ORAL DAILY
Status: DISCONTINUED | OUTPATIENT
Start: 2024-12-31 | End: 2025-01-03 | Stop reason: HOSPADM

## 2024-12-30 RX ORDER — ASCORBIC ACID 250 MG
1000 TABLET ORAL DAILY
Status: DISCONTINUED | OUTPATIENT
Start: 2024-12-30 | End: 2025-01-03 | Stop reason: HOSPADM

## 2024-12-30 RX ORDER — ASPIRIN 325 MG
325 TABLET ORAL ONCE
Status: COMPLETED | OUTPATIENT
Start: 2024-12-30 | End: 2024-12-30

## 2024-12-30 RX ORDER — CEFTRIAXONE 1 G/50ML
1 INJECTION, SOLUTION INTRAVENOUS EVERY 24 HOURS
Status: DISCONTINUED | OUTPATIENT
Start: 2024-12-31 | End: 2025-01-03 | Stop reason: HOSPADM

## 2024-12-30 RX ORDER — ATORVASTATIN CALCIUM 80 MG/1
80 TABLET, FILM COATED ORAL NIGHTLY
Status: DISCONTINUED | OUTPATIENT
Start: 2024-12-30 | End: 2025-01-03 | Stop reason: HOSPADM

## 2024-12-30 RX ORDER — IPRATROPIUM BROMIDE AND ALBUTEROL SULFATE 2.5; .5 MG/3ML; MG/3ML
3 SOLUTION RESPIRATORY (INHALATION) EVERY 2 HOUR PRN
Status: DISCONTINUED | OUTPATIENT
Start: 2024-12-30 | End: 2025-01-03 | Stop reason: HOSPADM

## 2024-12-30 RX ORDER — POLYETHYLENE GLYCOL 3350 17 G/17G
17 POWDER, FOR SOLUTION ORAL DAILY
COMMUNITY
Start: 2024-12-24

## 2024-12-30 RX ORDER — IPRATROPIUM BROMIDE AND ALBUTEROL SULFATE 2.5; .5 MG/3ML; MG/3ML
3 SOLUTION RESPIRATORY (INHALATION)
Status: DISCONTINUED | OUTPATIENT
Start: 2024-12-30 | End: 2025-01-01

## 2024-12-30 RX ORDER — TAMSULOSIN HYDROCHLORIDE 0.4 MG/1
0.4 CAPSULE ORAL NIGHTLY
Status: DISCONTINUED | OUTPATIENT
Start: 2024-12-30 | End: 2025-01-03 | Stop reason: HOSPADM

## 2024-12-30 RX ORDER — ALLOPURINOL 300 MG/1
300 TABLET ORAL DAILY
Status: DISCONTINUED | OUTPATIENT
Start: 2024-12-30 | End: 2025-01-03 | Stop reason: HOSPADM

## 2024-12-30 RX ORDER — CEFTRIAXONE 1 G/50ML
1 INJECTION, SOLUTION INTRAVENOUS ONCE
Status: COMPLETED | OUTPATIENT
Start: 2024-12-30 | End: 2024-12-30

## 2024-12-30 RX ORDER — FUROSEMIDE 40 MG/1
40 TABLET ORAL
Status: DISCONTINUED | OUTPATIENT
Start: 2024-12-30 | End: 2025-01-03 | Stop reason: HOSPADM

## 2024-12-30 RX ORDER — ALLOPURINOL 300 MG/1
300 TABLET ORAL DAILY
COMMUNITY

## 2024-12-30 RX ORDER — AMLODIPINE BESYLATE 5 MG/1
2.5 TABLET ORAL DAILY
Status: DISCONTINUED | OUTPATIENT
Start: 2024-12-30 | End: 2025-01-03 | Stop reason: HOSPADM

## 2024-12-30 RX ORDER — ALOGLIPTIN 25 MG/1
25 TABLET, FILM COATED ORAL DAILY
COMMUNITY
Start: 2024-12-24

## 2024-12-30 RX ORDER — PYRIDOXINE HCL (VITAMIN B6) 100 MG
100 TABLET ORAL DAILY
COMMUNITY
Start: 2024-12-24

## 2024-12-30 RX ORDER — IBUPROFEN 100 MG/5ML
1000 SUSPENSION, ORAL (FINAL DOSE FORM) ORAL DAILY
COMMUNITY
Start: 2024-12-24

## 2024-12-30 RX ORDER — HYDROCHLOROTHIAZIDE 12.5 MG/1
12.5 TABLET ORAL DAILY
Status: DISCONTINUED | OUTPATIENT
Start: 2024-12-30 | End: 2025-01-03 | Stop reason: HOSPADM

## 2024-12-30 RX ORDER — IPRATROPIUM BROMIDE AND ALBUTEROL SULFATE 2.5; .5 MG/3ML; MG/3ML
3 SOLUTION RESPIRATORY (INHALATION)
COMMUNITY
Start: 2024-12-21

## 2024-12-30 RX ORDER — CHOLECALCIFEROL (VITAMIN D3) 25 MCG
5000 TABLET ORAL NIGHTLY
Status: DISCONTINUED | OUTPATIENT
Start: 2024-12-30 | End: 2025-01-03 | Stop reason: HOSPADM

## 2024-12-30 RX ORDER — HYDROCHLOROTHIAZIDE 12.5 MG/1
12.5 TABLET ORAL DAILY
COMMUNITY
Start: 2024-10-19

## 2024-12-30 RX ORDER — ACETAMINOPHEN 500 MG
5000 TABLET ORAL NIGHTLY
COMMUNITY

## 2024-12-30 RX ORDER — PANTOPRAZOLE SODIUM 40 MG/1
40 TABLET, DELAYED RELEASE ORAL
COMMUNITY
Start: 2024-08-15

## 2024-12-30 RX ORDER — VALSARTAN 80 MG/1
320 TABLET ORAL DAILY
Status: DISCONTINUED | OUTPATIENT
Start: 2024-12-30 | End: 2025-01-03 | Stop reason: HOSPADM

## 2024-12-30 RX ORDER — FUROSEMIDE 40 MG/1
40 TABLET ORAL
COMMUNITY
Start: 2024-12-20

## 2024-12-30 RX ADMIN — POLYETHYLENE GLYCOL 3350 17 G: 17 POWDER, FOR SOLUTION ORAL at 17:38

## 2024-12-30 RX ADMIN — AMLODIPINE BESYLATE 2.5 MG: 5 TABLET ORAL at 17:38

## 2024-12-30 RX ADMIN — Medication 100 MG: at 17:42

## 2024-12-30 RX ADMIN — VALSARTAN 320 MG: 80 TABLET, FILM COATED ORAL at 17:39

## 2024-12-30 RX ADMIN — FUROSEMIDE 40 MG: 40 TABLET ORAL at 17:38

## 2024-12-30 RX ADMIN — CEFTRIAXONE SODIUM 1 G: 1 INJECTION, SOLUTION INTRAVENOUS at 01:55

## 2024-12-30 RX ADMIN — IPRATROPIUM BROMIDE AND ALBUTEROL SULFATE 3 ML: .5; 3 SOLUTION RESPIRATORY (INHALATION) at 19:43

## 2024-12-30 RX ADMIN — Medication 5000 UNITS: at 22:50

## 2024-12-30 RX ADMIN — ASPIRIN 325 MG ORAL TABLET 325 MG: 325 PILL ORAL at 01:55

## 2024-12-30 RX ADMIN — TAMSULOSIN HYDROCHLORIDE 0.4 MG: 0.4 CAPSULE ORAL at 22:50

## 2024-12-30 RX ADMIN — HYDROCHLOROTHIAZIDE 12.5 MG: 25 TABLET ORAL at 17:38

## 2024-12-30 RX ADMIN — Medication 1000 MG: at 17:39

## 2024-12-30 RX ADMIN — ALLOPURINOL 300 MG: 300 TABLET ORAL at 17:39

## 2024-12-30 RX ADMIN — ATORVASTATIN CALCIUM 80 MG: 80 TABLET, FILM COATED ORAL at 22:50

## 2024-12-30 SDOH — SOCIAL STABILITY: SOCIAL INSECURITY: WITHIN THE LAST YEAR, HAVE YOU BEEN AFRAID OF YOUR PARTNER OR EX-PARTNER?: NO

## 2024-12-30 SDOH — ECONOMIC STABILITY: FOOD INSECURITY: WITHIN THE PAST 12 MONTHS, THE FOOD YOU BOUGHT JUST DIDN'T LAST AND YOU DIDN'T HAVE MONEY TO GET MORE.: NEVER TRUE

## 2024-12-30 SDOH — SOCIAL STABILITY: SOCIAL INSECURITY: ARE YOU OR HAVE YOU BEEN THREATENED OR ABUSED PHYSICALLY, EMOTIONALLY, OR SEXUALLY BY ANYONE?: NO

## 2024-12-30 SDOH — ECONOMIC STABILITY: HOUSING INSECURITY: AT ANY TIME IN THE PAST 12 MONTHS, WERE YOU HOMELESS OR LIVING IN A SHELTER (INCLUDING NOW)?: NO

## 2024-12-30 SDOH — ECONOMIC STABILITY: INCOME INSECURITY: IN THE PAST 12 MONTHS HAS THE ELECTRIC, GAS, OIL, OR WATER COMPANY THREATENED TO SHUT OFF SERVICES IN YOUR HOME?: NO

## 2024-12-30 SDOH — SOCIAL STABILITY: SOCIAL INSECURITY: DO YOU FEEL UNSAFE GOING BACK TO THE PLACE WHERE YOU ARE LIVING?: NO

## 2024-12-30 SDOH — SOCIAL STABILITY: SOCIAL INSECURITY: WITHIN THE LAST YEAR, HAVE YOU BEEN HUMILIATED OR EMOTIONALLY ABUSED IN OTHER WAYS BY YOUR PARTNER OR EX-PARTNER?: NO

## 2024-12-30 SDOH — ECONOMIC STABILITY: FOOD INSECURITY: WITHIN THE PAST 12 MONTHS, YOU WORRIED THAT YOUR FOOD WOULD RUN OUT BEFORE YOU GOT THE MONEY TO BUY MORE.: NEVER TRUE

## 2024-12-30 SDOH — ECONOMIC STABILITY: FOOD INSECURITY: HOW HARD IS IT FOR YOU TO PAY FOR THE VERY BASICS LIKE FOOD, HOUSING, MEDICAL CARE, AND HEATING?: NOT VERY HARD

## 2024-12-30 SDOH — SOCIAL STABILITY: SOCIAL INSECURITY: ABUSE: ADULT

## 2024-12-30 SDOH — ECONOMIC STABILITY: HOUSING INSECURITY: IN THE LAST 12 MONTHS, WAS THERE A TIME WHEN YOU WERE NOT ABLE TO PAY THE MORTGAGE OR RENT ON TIME?: NO

## 2024-12-30 SDOH — ECONOMIC STABILITY: HOUSING INSECURITY: IN THE PAST 12 MONTHS, HOW MANY TIMES HAVE YOU MOVED WHERE YOU WERE LIVING?: 2

## 2024-12-30 SDOH — SOCIAL STABILITY: SOCIAL INSECURITY: HAVE YOU HAD THOUGHTS OF HARMING ANYONE ELSE?: NO

## 2024-12-30 SDOH — SOCIAL STABILITY: SOCIAL INSECURITY: WERE YOU ABLE TO COMPLETE ALL THE BEHAVIORAL HEALTH SCREENINGS?: YES

## 2024-12-30 SDOH — SOCIAL STABILITY: SOCIAL INSECURITY: ARE THERE ANY APPARENT SIGNS OF INJURIES/BEHAVIORS THAT COULD BE RELATED TO ABUSE/NEGLECT?: NO

## 2024-12-30 SDOH — SOCIAL STABILITY: SOCIAL INSECURITY: DO YOU FEEL ANYONE HAS EXPLOITED OR TAKEN ADVANTAGE OF YOU FINANCIALLY OR OF YOUR PERSONAL PROPERTY?: NO

## 2024-12-30 SDOH — SOCIAL STABILITY: SOCIAL INSECURITY: HAVE YOU HAD ANY THOUGHTS OF HARMING ANYONE ELSE?: NO

## 2024-12-30 SDOH — SOCIAL STABILITY: SOCIAL INSECURITY
WITHIN THE LAST YEAR, HAVE YOU BEEN RAPED OR FORCED TO HAVE ANY KIND OF SEXUAL ACTIVITY BY YOUR PARTNER OR EX-PARTNER?: NO

## 2024-12-30 SDOH — ECONOMIC STABILITY: TRANSPORTATION INSECURITY: IN THE PAST 12 MONTHS, HAS LACK OF TRANSPORTATION KEPT YOU FROM MEDICAL APPOINTMENTS OR FROM GETTING MEDICATIONS?: NO

## 2024-12-30 SDOH — SOCIAL STABILITY: SOCIAL INSECURITY: HAS ANYONE EVER THREATENED TO HURT YOUR FAMILY OR YOUR PETS?: NO

## 2024-12-30 SDOH — SOCIAL STABILITY: SOCIAL INSECURITY: DOES ANYONE TRY TO KEEP YOU FROM HAVING/CONTACTING OTHER FRIENDS OR DOING THINGS OUTSIDE YOUR HOME?: NO

## 2024-12-30 ASSESSMENT — COGNITIVE AND FUNCTIONAL STATUS - GENERAL
TOILETING: A LITTLE
DAILY ACTIVITIY SCORE: 17
MOVING TO AND FROM BED TO CHAIR: A LOT
STANDING UP FROM CHAIR USING ARMS: A LOT
HELP NEEDED FOR BATHING: A LITTLE
DRESSING REGULAR LOWER BODY CLOTHING: A LOT
MOVING FROM LYING ON BACK TO SITTING ON SIDE OF FLAT BED WITH BEDRAILS: A LITTLE
EATING MEALS: A LITTLE
PERSONAL GROOMING: A LITTLE
DRESSING REGULAR UPPER BODY CLOTHING: A LITTLE
TURNING FROM BACK TO SIDE WHILE IN FLAT BAD: A LITTLE
MOBILITY SCORE: 14
CLIMB 3 TO 5 STEPS WITH RAILING: A LOT
PATIENT BASELINE BEDBOUND: NO
WALKING IN HOSPITAL ROOM: A LOT

## 2024-12-30 ASSESSMENT — ACTIVITIES OF DAILY LIVING (ADL)
HEARING - LEFT EAR: DIFFICULTY WITH NOISE
GROOMING: NEEDS ASSISTANCE
JUDGMENT_ADEQUATE_SAFELY_COMPLETE_DAILY_ACTIVITIES: YES
BATHING: NEEDS ASSISTANCE
TOILETING: NEEDS ASSISTANCE
FEEDING YOURSELF: NEEDS ASSISTANCE
HEARING - RIGHT EAR: DIFFICULTY WITH NOISE
PATIENT'S MEMORY ADEQUATE TO SAFELY COMPLETE DAILY ACTIVITIES?: YES
ADEQUATE_TO_COMPLETE_ADL: YES
DRESSING YOURSELF: NEEDS ASSISTANCE
WALKS IN HOME: NEEDS ASSISTANCE
ASSISTIVE_DEVICE: WALKER
LACK_OF_TRANSPORTATION: NO
LACK_OF_TRANSPORTATION: NO

## 2024-12-30 ASSESSMENT — LIFESTYLE VARIABLES
AUDIT-C TOTAL SCORE: 0
SUBSTANCE_ABUSE_PAST_12_MONTHS: NO
HOW OFTEN DO YOU HAVE 6 OR MORE DRINKS ON ONE OCCASION: NEVER
HOW OFTEN DO YOU HAVE A DRINK CONTAINING ALCOHOL: NEVER
SKIP TO QUESTIONS 9-10: 1
HOW MANY STANDARD DRINKS CONTAINING ALCOHOL DO YOU HAVE ON A TYPICAL DAY: PATIENT DOES NOT DRINK
AUDIT-C TOTAL SCORE: 0
PRESCIPTION_ABUSE_PAST_12_MONTHS: NO

## 2024-12-30 ASSESSMENT — PAIN SCALES - GENERAL: PAINLEVEL_OUTOF10: 0 - NO PAIN

## 2024-12-30 NOTE — PROGRESS NOTES
Pharmacy Medication History Review    Regulo Marques is a 84 y.o. male admitted for TIA (transient ischemic attack). Pharmacy reviewed the patient's [id number] medications and allergies for accuracy.    The list below reflectives the updated PTA list. Please review each medication in order reconciliation for additional clarification and justification.  Prior to Admission medications    Medication Sig Start Date End Date Taking? Authorizing Provider   dextromethorphan-guaifenesin  mg/5 mL liquid Take 10 mL by mouth every 4 hours if needed (cough). 12/20/24  Yes Historical Provider, MD   hydroCHLOROthiazide (Microzide) 12.5 mg tablet Take 1 tablet (12.5 mg) by mouth once daily. 10/19/24  Yes Historical Provider, MD   ipratropium-albuteroL (Duo-Neb) 0.5-2.5 mg/3 mL nebulizer solution Take 3 mL by nebulization 4 times a day. 12/21/24  Yes Historical Provider, MD   Lasix 40 mg tablet Take 1 tablet (40 mg) by mouth 2 times daily (morning and late afternoon). 12/20/24  Yes Historical Provider, MD   olmesartan (BENIcar) 40 mg tablet Take 1 tablet (40 mg) by mouth once daily. 5/2/16  Yes Historical Provider, MD   Protonix 40 mg EC tablet Take 1 tablet (40 mg) by mouth once daily in the morning. Take before meals. 8/15/24  Yes Historical Provider, MD   tamsulosin (Flomax) 0.4 mg 24 hr capsule Take 2 capsules (0.8 mg) by mouth once daily at bedtime.  Patient taking differently: Take 1 capsule (0.4 mg) by mouth once daily at bedtime. 9/16/24  Yes Allison Díaz MD   vit A/vit C/vit E/zinc/copper (PRESERVISION AREDS ORAL) Take 1 tablet by mouth 2 times a day.   Yes Historical Provider, MD   acetaminophen (Tylenol) 325 mg tablet Take 2 tablets (650 mg) by mouth every 6 hours if needed for headaches, moderate pain (4 - 6) or fever (temp greater than 38.0 C). 9/16/24   Allison Díaz MD   acetaminophen (Tylenol) 650 mg/20.3 mL solution oral liquid Take 20.3 mL (650 mg) by mouth every 4  hours if needed for fever (temp greater than 38.0 C) or pain. 9/16/24   Allison Díaz MD   allopurinol (Zyloprim) 300 mg tablet Take 1 tablet (300 mg) by mouth once daily.   Yes Historical Provider, MD   alogliptin (Nesina) 25 mg tablet Take 1 tablet (25 mg) by mouth once daily. 12/24/24  Yes Historical Provider, MD   amLODIPine (Norvasc) 2.5 mg tablet Take 1 tablet (2.5 mg) by mouth once daily.   Yes Historical Provider, MD   ascorbic acid (Vitamin C) 1,000 mg tablet Take 1 tablet (1,000 mg) by mouth once daily. 12/24/24  Yes Historical Provider, MD   aspirin 81 mg chewable tablet Chew 1 tablet (81 mg) once daily.  Patient not taking: Reported on 12/30/2024 9/24/24  no Paul Casarez MD   atorvastatin (Lipitor) 80 mg tablet Take 1 tablet (80 mg) by mouth once daily at bedtime.  Patient not taking: Reported on 12/30/2024 9/5/24  no Paul Casarez MD   bisacodyl (Dulcolax) 10 mg suppository Insert 1 suppository (10 mg) into the rectum once daily as needed for constipation. 9/16/24  Yes Allison Díaz MD   bisacodyl (Dulcolax) 5 mg EC tablet Take 2 tablets (10 mg) by mouth once daily as needed for constipation. Do not crush, chew, or split.  Patient not taking: Reported on 12/30/2024 9/16/24  no Allison Díaz MD   cholecalciferol (Vitamin D-3) 5,000 Units tablet Take 1 tablet (5,000 Units) by mouth once daily at bedtime.   Yes Historical Provider, MD   insulin lispro (HumaLOG) 100 unit/mL injection Inject 0-5 Units under the skin 3 times daily (morning, midday, late afternoon). Take as directed per insulin instructions.  Patient not taking: Reported on 12/30/2024 9/16/24  no Allison Díaz MD   polyethylene glycol (Glycolax, Miralax) 17 gram packet Take 17 g by mouth once daily.  Patient taking differently: Take 17 g by mouth once daily as needed (constipation). 9/24/24  Yes Paul Casarez MD   polyethylene glycol (Glycolax, Miralax) 17 gram  packet Take 17 g by mouth once daily. 12/24/24  Yes Historical Provider, MD   pyridoxine (Vitamin B-6) 100 mg tablet Take 1 tablet (100 mg) by mouth once daily. 12/24/24  Yes Historical Provider, MD   vit A,C and E-lutein-minerals (Ocuvite) 300 mcg-200 mg-27 mg-2 mg tablet Take 1,000 mg by mouth once daily.  Patient not taking: Reported on 12/30/2024 5/10/24  no Historical Provider, MD        The list below reflectives the updated allergy list. Please review each documented allergy for additional clarification and justification.  Allergies  Reviewed by Leticia Burgess on 12/30/2024   No Known Allergies         Below are additional concerns with the patient's PTA list.    Medication list from [facility], printed 12/29/2024 2047.    Leticia Burgess

## 2024-12-30 NOTE — ED PROVIDER NOTES
"HPI   Chief Complaint   Patient presents with    Weakness, Gen     PT. ARRIVED VIA EMS TO ED FROM [facility] FOR WEAKNESS. PER EMS REPORT PT. HAD LT SIDED WEAKNESS THAT LASTED 30MIN, RESOLVED PRIOR TO EMS ARRIVAL. PT. ON CARDIAC MONITOR READING AFIB W HR OF 60. PT. BREATHING UNLABORED, SPEAKING IN FULL SENTENCES, B/L LUNG SOUNDS CLEAR PULSE OX READING 94% ON RA. PT. PUPILS EQUAL/REACTIVE, NO DRIFT, NO DROOP, CLEAR SPEECH, B/L UPPER/LOWER EXTREMITY STRENGTH 5/5.        84yM HTN DM hx colon cancer BIBEMS from [facility] for \"stroke-like\" symptoms.  Pt had ~10min episode at facility- he reports that his jaw was shaking, but family reports that he had left sided weakness.  No LOC.  Pt reports similar episode last week that also resolved without intervention.  Pt had stroke/TIA over a month ago as well.  No recent falls.      History provided by:  Patient and EMS personnel   used: No            Patient History   Past Medical History:   Diagnosis Date    Diverticulitis of intestine, part unspecified, without perforation or abscess without bleeding     Diverticulitis    Personal history of malignant neoplasm, unspecified     History of malignant neoplasm    Personal history of other diseases of the circulatory system     History of hypertension    Personal history of other diseases of the digestive system     History of diverticulitis    Personal history of other endocrine, nutritional and metabolic disease     History of diabetes mellitus    Personal history of other malignant neoplasm of large intestine     History of malignant neoplasm of colon    Personal history of other specified conditions     History of abdominal pain     Past Surgical History:   Procedure Laterality Date    COLONOSCOPY  10/20/2016    Colonoscopy (Fiberoptic)    OTHER SURGICAL HISTORY  11/08/2019    Gallbladder surgery    OTHER SURGICAL HISTORY  11/08/2019    Carpal tunnel surgery    TONSILLECTOMY  10/20/2016    " Tonsillectomy    TOTAL HIP ARTHROPLASTY  10/20/2016    Total Hip Replacement     No family history on file.  Social History     Tobacco Use    Smoking status: Former     Types: Cigarettes    Smokeless tobacco: Never   Vaping Use    Vaping status: Never Used   Substance Use Topics    Alcohol use: Never    Drug use: Never       Physical Exam   ED Triage Vitals [12/29/24 2202]   Temperature Heart Rate Respirations BP   36.5 °C (97.7 °F) 60 18 109/59      Pulse Ox Temp Source Heart Rate Source Patient Position   94 % Temporal Monitor Sitting      BP Location FiO2 (%)     Left arm --       Physical Exam  Vitals and nursing note reviewed.   Constitutional:       General: He is not in acute distress.     Appearance: Normal appearance. He is not ill-appearing, toxic-appearing or diaphoretic.   HENT:      Head: Normocephalic and atraumatic.      Nose: Nose normal. No congestion or rhinorrhea.      Mouth/Throat:      Mouth: Mucous membranes are moist.      Pharynx: No oropharyngeal exudate or posterior oropharyngeal erythema.   Eyes:      General: No scleral icterus.        Right eye: No discharge.         Left eye: No discharge.      Extraocular Movements: Extraocular movements intact.      Conjunctiva/sclera: Conjunctivae normal.   Cardiovascular:      Rate and Rhythm: Normal rate and regular rhythm.      Heart sounds: No murmur heard.     No friction rub. No gallop.   Pulmonary:      Effort: Pulmonary effort is normal. No respiratory distress.      Breath sounds: Normal breath sounds. No stridor. No wheezing, rhonchi or rales.   Chest:      Chest wall: No tenderness.   Abdominal:      General: There is no distension.      Palpations: Abdomen is soft.      Tenderness: There is no abdominal tenderness. There is no guarding or rebound.   Musculoskeletal:         General: No swelling, tenderness, deformity or signs of injury. Normal range of motion.      Cervical back: Normal range of motion and neck supple. No rigidity.    Skin:     General: Skin is warm and dry.      Coloration: Skin is not jaundiced or pale.      Findings: No bruising, erythema, lesion or rash.   Neurological:      General: No focal deficit present.      Mental Status: He is alert and oriented to person, place, and time.      Cranial Nerves: No cranial nerve deficit.      Sensory: No sensory deficit.      Motor: No weakness.      Coordination: Coordination normal.      Comments: NIHSS 0   Psychiatric:         Mood and Affect: Mood normal.         Behavior: Behavior normal.           ED Course & MDM   ED Course as of 12/30/24 0049   Sun Dec 29, 2024   2216 EKG interpreted by me include 483 left axis deviation intraventricular block no STEMI [EK]   2336 Labs reviewed, no leukocytosis, severe anemia hemoglobin 7.5 at his baseline, normal electrolytes aside from mild hypochloremia, MARIMAR on CKD creatinine 2.5 up from prior 1.4 [EK]   Mon Dec 30, 2024   0000 CXR interpreted by me without pneumothorax or pneumonia [EK]   0001 CTH interpreted by me without ICH or midline shift and with R temporal encephalomalacia corresponding to known prior stroke [EK]      ED Course User Index  [EK] Elyse H Klerman, MD         Diagnoses as of 12/30/24 0049   TIA (transient ischemic attack)   Acute cystitis with hematuria                 No data recorded     [place] Coma Scale Score: 15 (12/29/24 2207 : Natalya Dwyer RN)                           Medical Decision Making  84yM presents with L sided weakness and facial droop x 10min, now resolved, consistent with TIA.  Pt hemodynamically stable on ED arrival and NIHSS 0.  EKG afib without new ischemia.  CXR without pneumothorax or pneumonia.  CTH with sequelae of prior R MCA stroke without acute findings.  Labs with stable severe anemia, MARIMAR on CKD and UTI.  Pt treated with full dose aspirin and IV ceftriaxone.  Results reviewed with pt and family at bedside.  Discussed with PCP Dr. Casarez who recommends admit for observation  overnight.  Will keep pt off anticoagulation in agreement with pt, family and PCP given hx of severe GIB even though he has had intermittent stroke symptoms and ongoing afib off a/c.    Amount and/or Complexity of Data Reviewed  External Data Reviewed: labs and notes.  Labs: ordered. Decision-making details documented in ED Course.  Radiology: ordered and independent interpretation performed. Decision-making details documented in ED Course.  ECG/medicine tests: ordered and independent interpretation performed. Decision-making details documented in ED Course.    Risk  Prescription drug management.  Decision regarding hospitalization.        Procedure  Procedures     Elyse H Klerman, MD  12/30/24 0051

## 2024-12-30 NOTE — H&P
84m pmh of sbo, htn, lipids, paf, prior stroke (anticoag intol); ckd, h/o gib, chronic anemia; most recently at [facility] for snf; returned to [place] [facility] after episode of l sided weakess and jaw shaking; sx resolved spont; ua suggestive of uti; already on 81 asa, begun on if rocephin and admitted; pt currently at baseline; awake, coversant, reasonably sym ue strength; no f/c  Pmh: as above  Soch: no etoh/tob; [place]  Famh: noncont  All/meds/labs noted  Exam: nad, in bed in ed; speech wnl; sym ue strength, irreg cta soft nt nd no sig edema  A/p: prob tia, h/o cvd, ?uti, ckd, anemia, h/o gib, htn, lipids, ftt, et al  Cont 81 asa, cont iv rocephin, await uc results, stable w/poor overall prognosis  Assessment & Plan  TIA (transient ischemic attack)    TIA on medication      Paul Casarez MD

## 2024-12-31 LAB
ACANTHOCYTES BLD QL SMEAR: NORMAL
ANION GAP SERPL CALC-SCNC: 15 MMOL/L (ref 10–20)
BACTERIA UR CULT: NORMAL
BASOPHILS # BLD AUTO: 0.03 X10*3/UL (ref 0–0.1)
BASOPHILS NFR BLD AUTO: 0.4 %
BUN SERPL-MCNC: 46 MG/DL (ref 6–23)
CALCIUM SERPL-MCNC: 9.1 MG/DL (ref 8.6–10.3)
CHLORIDE SERPL-SCNC: 98 MMOL/L (ref 98–107)
CO2 SERPL-SCNC: 31 MMOL/L (ref 21–32)
CREAT SERPL-MCNC: 2.24 MG/DL (ref 0.5–1.3)
DACRYOCYTES BLD QL SMEAR: NORMAL
EGFRCR SERPLBLD CKD-EPI 2021: 28 ML/MIN/1.73M*2
EOSINOPHIL # BLD AUTO: 0.22 X10*3/UL (ref 0–0.4)
EOSINOPHIL NFR BLD AUTO: 2.7 %
ERYTHROCYTE [DISTWIDTH] IN BLOOD BY AUTOMATED COUNT: 20.4 % (ref 11.5–14.5)
GLUCOSE SERPL-MCNC: 134 MG/DL (ref 74–99)
HCT VFR BLD AUTO: 25.7 % (ref 41–52)
HGB BLD-MCNC: 7.6 G/DL (ref 13.5–17.5)
HYPOCHROMIA BLD QL SMEAR: NORMAL
IMM GRANULOCYTES # BLD AUTO: 0.04 X10*3/UL (ref 0–0.5)
IMM GRANULOCYTES NFR BLD AUTO: 0.5 % (ref 0–0.9)
LYMPHOCYTES # BLD AUTO: 0.87 X10*3/UL (ref 0.8–3)
LYMPHOCYTES NFR BLD AUTO: 10.6 %
MCH RBC QN AUTO: 24.5 PG (ref 26–34)
MCHC RBC AUTO-ENTMCNC: 29.6 G/DL (ref 32–36)
MCV RBC AUTO: 83 FL (ref 80–100)
MONOCYTES # BLD AUTO: 0.44 X10*3/UL (ref 0.05–0.8)
MONOCYTES NFR BLD AUTO: 5.4 %
NEUTROPHILS # BLD AUTO: 6.6 X10*3/UL (ref 1.6–5.5)
NEUTROPHILS NFR BLD AUTO: 80.4 %
NRBC BLD-RTO: 0 /100 WBCS (ref 0–0)
OVALOCYTES BLD QL SMEAR: NORMAL
PLATELET # BLD AUTO: 271 X10*3/UL (ref 150–450)
POTASSIUM SERPL-SCNC: 3.5 MMOL/L (ref 3.5–5.3)
RBC # BLD AUTO: 3.1 X10*6/UL (ref 4.5–5.9)
RBC MORPH BLD: NORMAL
SCHISTOCYTES BLD QL SMEAR: NORMAL
SODIUM SERPL-SCNC: 140 MMOL/L (ref 136–145)
TARGETS BLD QL SMEAR: NORMAL
WBC # BLD AUTO: 8.2 X10*3/UL (ref 4.4–11.3)

## 2024-12-31 PROCEDURE — G0378 HOSPITAL OBSERVATION PER HR: HCPCS

## 2024-12-31 PROCEDURE — 80048 BASIC METABOLIC PNL TOTAL CA: CPT | Performed by: INTERNAL MEDICINE

## 2024-12-31 PROCEDURE — 2500000002 HC RX 250 W HCPCS SELF ADMINISTERED DRUGS (ALT 637 FOR MEDICARE OP, ALT 636 FOR OP/ED): Performed by: INTERNAL MEDICINE

## 2024-12-31 PROCEDURE — 36415 COLL VENOUS BLD VENIPUNCTURE: CPT | Performed by: INTERNAL MEDICINE

## 2024-12-31 PROCEDURE — 85025 COMPLETE CBC W/AUTO DIFF WBC: CPT | Performed by: INTERNAL MEDICINE

## 2024-12-31 PROCEDURE — 94640 AIRWAY INHALATION TREATMENT: CPT | Mod: MUE

## 2024-12-31 PROCEDURE — 96366 THER/PROPH/DIAG IV INF ADDON: CPT

## 2024-12-31 PROCEDURE — 2500000001 HC RX 250 WO HCPCS SELF ADMINISTERED DRUGS (ALT 637 FOR MEDICARE OP): Performed by: INTERNAL MEDICINE

## 2024-12-31 PROCEDURE — 2500000004 HC RX 250 GENERAL PHARMACY W/ HCPCS (ALT 636 FOR OP/ED): Performed by: INTERNAL MEDICINE

## 2024-12-31 RX ORDER — CEPHALEXIN 250 MG/1
250 CAPSULE ORAL 3 TIMES DAILY
Qty: 21 CAPSULE | Refills: 0 | Status: SHIPPED | OUTPATIENT
Start: 2024-12-31 | End: 2025-01-07

## 2024-12-31 RX ADMIN — IPRATROPIUM BROMIDE AND ALBUTEROL SULFATE 3 ML: .5; 3 SOLUTION RESPIRATORY (INHALATION) at 18:44

## 2024-12-31 RX ADMIN — IPRATROPIUM BROMIDE AND ALBUTEROL SULFATE 3 ML: .5; 3 SOLUTION RESPIRATORY (INHALATION) at 07:09

## 2024-12-31 RX ADMIN — IPRATROPIUM BROMIDE AND ALBUTEROL SULFATE 3 ML: .5; 3 SOLUTION RESPIRATORY (INHALATION) at 14:52

## 2024-12-31 RX ADMIN — VALSARTAN 320 MG: 80 TABLET, FILM COATED ORAL at 08:10

## 2024-12-31 RX ADMIN — Medication 5000 UNITS: at 21:09

## 2024-12-31 RX ADMIN — AMLODIPINE BESYLATE 2.5 MG: 5 TABLET ORAL at 08:10

## 2024-12-31 RX ADMIN — HYDROCHLOROTHIAZIDE 12.5 MG: 25 TABLET ORAL at 08:11

## 2024-12-31 RX ADMIN — CEFTRIAXONE SODIUM 1 G: 1 INJECTION, SOLUTION INTRAVENOUS at 02:03

## 2024-12-31 RX ADMIN — IPRATROPIUM BROMIDE AND ALBUTEROL SULFATE 3 ML: .5; 3 SOLUTION RESPIRATORY (INHALATION) at 11:04

## 2024-12-31 RX ADMIN — ASPIRIN 81 MG CHEWABLE TABLET 81 MG: 81 TABLET CHEWABLE at 08:11

## 2024-12-31 RX ADMIN — PANTOPRAZOLE SODIUM 40 MG: 40 TABLET, DELAYED RELEASE ORAL at 08:12

## 2024-12-31 RX ADMIN — Medication 1000 MG: at 08:11

## 2024-12-31 RX ADMIN — ATORVASTATIN CALCIUM 80 MG: 80 TABLET, FILM COATED ORAL at 21:12

## 2024-12-31 RX ADMIN — Medication 100 MG: at 12:28

## 2024-12-31 RX ADMIN — ALLOPURINOL 300 MG: 300 TABLET ORAL at 08:11

## 2024-12-31 RX ADMIN — FUROSEMIDE 40 MG: 40 TABLET ORAL at 08:11

## 2024-12-31 ASSESSMENT — PAIN SCALES - GENERAL
PAINLEVEL_OUTOF10: 0 - NO PAIN
PAINLEVEL_OUTOF10: 0 - NO PAIN

## 2024-12-31 NOTE — DISCHARGE SUMMARY
Dc summ  Dx: tia; ?uti; h/o cvd, mild l boubacar; afib, debility, ckd, anemia  Procec/op none  Pt admitted w/above; tia sx brief (ten minutes); ua susp for uti but uc contam; manged w/iv rocephin would take keflex 250 tid for 7d; by 12/31 was at baseline and ready for return to snf at ps; meds listed; condition stable; overall prognosis poor    Paul Casarez MD

## 2024-12-31 NOTE — CARE PLAN
The patient's goals for the shift include      The clinical goals for the shift include no deficit changes      Problem: Skin  Goal: Decreased wound size/increased tissue granulation at next dressing change  12/31/2024 1048 by Toshia Bryant RN  Flowsheets (Taken 12/31/2024 0047 by Tesha Holm RN)  Decreased wound size/increased tissue granulation at next dressing change: Promote sleep for wound healing  12/31/2024 1048 by Toshia Bryant RN  Outcome: Progressing  Goal: Participates in plan/prevention/treatment measures  12/31/2024 1048 by Toshia Bryant RN  Flowsheets (Taken 12/31/2024 0047 by Tesha Holm RN)  Participates in plan/prevention/treatment measures:   Elevate heels   Increase activity/out of bed for meals  12/31/2024 1048 by Toshia Bryant RN  Outcome: Progressing  Goal: Prevent/manage excess moisture  12/31/2024 1048 by Toshia Bryant RN  Flowsheets (Taken 12/31/2024 1048)  Prevent/manage excess moisture: Cleanse incontinence/protect with barrier cream  12/31/2024 1048 by Toshia Bryant RN  Outcome: Progressing  Flowsheets (Taken 12/31/2024 1048)  Prevent/manage excess moisture: Cleanse incontinence/protect with barrier cream  Goal: Prevent/minimize sheer/friction injuries  12/31/2024 1048 by Toshia Bryant RN  Flowsheets (Taken 12/31/2024 1048)  Prevent/minimize sheer/friction injuries: Complete micro-shifts as needed if patient unable. Adjust patient position to relieve pressure points, not a full turn  12/31/2024 1048 by Toshia Bryant RN  Outcome: Progressing  Flowsheets (Taken 12/31/2024 1048)  Prevent/minimize sheer/friction injuries: Complete micro-shifts as needed if patient unable. Adjust patient position to relieve pressure points, not a full turn  Goal: Promote/optimize nutrition  12/31/2024 1048 by Toshia Bryant RN  Flowsheets (Taken 12/31/2024 1048)  Promote/optimize nutrition: Assist with feeding  12/31/2024 1048 by Toshia Bryant RN  Outcome: Progressing  Flowsheets (Taken 12/31/2024 1048)  Promote/optimize  nutrition: Assist with feeding  Goal: Promote skin healing  12/31/2024 1048 by Toshia Bryant RN  Flowsheets (Taken 12/31/2024 1048)  Promote skin healing: Assess skin/pad under line(s)/device(s)  12/31/2024 1048 by Toshia Bryant RN  Outcome: Progressing  Flowsheets (Taken 12/31/2024 1048)  Promote skin healing: Assess skin/pad under line(s)/device(s)     Problem: Fall/Injury  Goal: Not fall by end of shift  Outcome: Progressing  Goal: Be free from injury by end of the shift  Outcome: Progressing  Goal: Verbalize understanding of personal risk factors for fall in the hospital  Outcome: Progressing  Goal: Verbalize understanding of risk factor reduction measures to prevent injury from fall in the home  Outcome: Progressing  Goal: Use assistive devices by end of the shift  Outcome: Progressing  Goal: Pace activities to prevent fatigue by end of the shift  Outcome: Progressing

## 2024-12-31 NOTE — NURSING NOTE
12/31/24 9204 Patient Navigator   I introduced myself to the patient and explained my role. Pt states he was living with his wife in an independent living facility, [facility] however was in a SNF recovering from a previous stroke. He states he was getting his meals at [facility] prepared for him. He was getting therapy at the SNF. Please see the Education tab for additional information. I reviewed the handouts listed below and he appreciated my visit. I gave him my business card and instructed him to call/email me as needed. He verbalized understanding of the above note. I have updated the patient's RN, Toshia, of my visit.     Handouts:   Stroke booklet  What can I eat? American Diabetes Association  How do I follow a healthy diet pattern? American Heart Association    Shirley HALL, RN  Patient Navigator  Stroke Educator  Diabetes Care &

## 2024-12-31 NOTE — CARE PLAN
The patient's goals for the shift include      The clinical goals for the shift include no deficit changes    Over the shift, the patient did not make progress toward the following goals. Barriers to progression include ***. Recommendations to address these barriers include ***.

## 2024-12-31 NOTE — PROGRESS NOTES
12/31/24 1414   Discharge Planning   Living Arrangements Spouse/significant other   Support Systems Spouse/significant other   Type of Residence Skilled nursing facility   Do you have animals or pets at home? No   Who is requesting discharge planning? Provider   Expected Discharge Disposition SNF   Stroke Family Assessment   Stroke Family Assessment Needed No   Intensity of Service   Intensity of Service 0-30 min     Met with pt and his family.  Pt presented from [facility] with weakness r/o CVA.  Plan is to return to this facility for therapy so pt can return to his Independent Living at [facility].  Asked for therapy order.    Will need precert to return to skilled care.  Team will make return referral in [facility].   Will follow.    Kristin Salazar RN [facility]

## 2024-12-31 NOTE — CARE PLAN
The patient's goals for the shift include      The clinical goals for the shift include no deficit changes    Problem: Skin  Goal: Decreased wound size/increased tissue granulation at next dressing change  Outcome: Progressing  Goal: Participates in plan/prevention/treatment measures  Outcome: Progressing  Goal: Prevent/manage excess moisture  Outcome: Progressing  Goal: Prevent/minimize sheer/friction injuries  Outcome: Progressing  Goal: Promote/optimize nutrition  Outcome: Progressing  Goal: Promote skin healing  Outcome: Progressing     Problem: Fall/Injury  Goal: Not fall by end of shift  Outcome: Progressing  Goal: Be free from injury by end of the shift  Outcome: Progressing  Goal: Verbalize understanding of personal risk factors for fall in the hospital  Outcome: Progressing  Goal: Verbalize understanding of risk factor reduction measures to prevent injury from fall in the home  Outcome: Progressing  Goal: Use assistive devices by end of the shift  Outcome: Progressing  Goal: Pace activities to prevent fatigue by end of the shift  Outcome: Progressing

## 2025-01-01 PROCEDURE — 97161 PT EVAL LOW COMPLEX 20 MIN: CPT | Mod: GP

## 2025-01-01 PROCEDURE — 96366 THER/PROPH/DIAG IV INF ADDON: CPT

## 2025-01-01 PROCEDURE — 94640 AIRWAY INHALATION TREATMENT: CPT

## 2025-01-01 PROCEDURE — G0378 HOSPITAL OBSERVATION PER HR: HCPCS

## 2025-01-01 PROCEDURE — 2500000002 HC RX 250 W HCPCS SELF ADMINISTERED DRUGS (ALT 637 FOR MEDICARE OP, ALT 636 FOR OP/ED): Mod: MUE | Performed by: INTERNAL MEDICINE

## 2025-01-01 PROCEDURE — 2500000004 HC RX 250 GENERAL PHARMACY W/ HCPCS (ALT 636 FOR OP/ED): Performed by: INTERNAL MEDICINE

## 2025-01-01 PROCEDURE — 2500000001 HC RX 250 WO HCPCS SELF ADMINISTERED DRUGS (ALT 637 FOR MEDICARE OP): Performed by: INTERNAL MEDICINE

## 2025-01-01 PROCEDURE — 97165 OT EVAL LOW COMPLEX 30 MIN: CPT | Mod: GO

## 2025-01-01 RX ADMIN — AMLODIPINE BESYLATE 2.5 MG: 5 TABLET ORAL at 10:19

## 2025-01-01 RX ADMIN — CEFTRIAXONE SODIUM 1 G: 1 INJECTION, SOLUTION INTRAVENOUS at 01:14

## 2025-01-01 RX ADMIN — FUROSEMIDE 40 MG: 40 TABLET ORAL at 17:38

## 2025-01-01 RX ADMIN — Medication 1000 MG: at 10:20

## 2025-01-01 RX ADMIN — IPRATROPIUM BROMIDE AND ALBUTEROL SULFATE 3 ML: .5; 3 SOLUTION RESPIRATORY (INHALATION) at 07:25

## 2025-01-01 RX ADMIN — HYDROCHLOROTHIAZIDE 12.5 MG: 25 TABLET ORAL at 10:20

## 2025-01-01 RX ADMIN — VALSARTAN 320 MG: 80 TABLET, FILM COATED ORAL at 10:20

## 2025-01-01 RX ADMIN — ASPIRIN 81 MG CHEWABLE TABLET 81 MG: 81 TABLET CHEWABLE at 10:20

## 2025-01-01 RX ADMIN — FUROSEMIDE 40 MG: 40 TABLET ORAL at 10:19

## 2025-01-01 RX ADMIN — Medication 5000 UNITS: at 21:05

## 2025-01-01 RX ADMIN — Medication 100 MG: at 10:20

## 2025-01-01 RX ADMIN — ALLOPURINOL 300 MG: 300 TABLET ORAL at 10:20

## 2025-01-01 RX ADMIN — ATORVASTATIN CALCIUM 80 MG: 80 TABLET, FILM COATED ORAL at 21:06

## 2025-01-01 RX ADMIN — TAMSULOSIN HYDROCHLORIDE 0.4 MG: 0.4 CAPSULE ORAL at 21:05

## 2025-01-01 RX ADMIN — PANTOPRAZOLE SODIUM 40 MG: 40 TABLET, DELAYED RELEASE ORAL at 06:42

## 2025-01-01 ASSESSMENT — COGNITIVE AND FUNCTIONAL STATUS - GENERAL
MOVING FROM LYING ON BACK TO SITTING ON SIDE OF FLAT BED WITH BEDRAILS: A LITTLE
MOVING FROM LYING ON BACK TO SITTING ON SIDE OF FLAT BED WITH BEDRAILS: A LITTLE
HELP NEEDED FOR BATHING: A LOT
WALKING IN HOSPITAL ROOM: A LOT
MOBILITY SCORE: 14
TOILETING: A LITTLE
TURNING FROM BACK TO SIDE WHILE IN FLAT BAD: A LITTLE
TOILETING: A LOT
WALKING IN HOSPITAL ROOM: A LOT
CLIMB 3 TO 5 STEPS WITH RAILING: A LOT
DRESSING REGULAR LOWER BODY CLOTHING: A LOT
TURNING FROM BACK TO SIDE WHILE IN FLAT BAD: A LITTLE
PERSONAL GROOMING: A LITTLE
MOVING TO AND FROM BED TO CHAIR: A LOT
STANDING UP FROM CHAIR USING ARMS: A LITTLE
CLIMB 3 TO 5 STEPS WITH RAILING: A LOT
MOBILITY SCORE: 15
DAILY ACTIVITIY SCORE: 16
DRESSING REGULAR UPPER BODY CLOTHING: A LITTLE
HELP NEEDED FOR BATHING: A LITTLE
DRESSING REGULAR UPPER BODY CLOTHING: A LITTLE
DRESSING REGULAR LOWER BODY CLOTHING: A LOT
MOVING TO AND FROM BED TO CHAIR: A LOT
EATING MEALS: A LITTLE
STANDING UP FROM CHAIR USING ARMS: A LOT

## 2025-01-01 ASSESSMENT — PAIN SCALES - GENERAL
PAINLEVEL_OUTOF10: 0 - NO PAIN

## 2025-01-01 ASSESSMENT — PAIN - FUNCTIONAL ASSESSMENT: PAIN_FUNCTIONAL_ASSESSMENT: 0-10

## 2025-01-01 ASSESSMENT — ACTIVITIES OF DAILY LIVING (ADL): BATHING_ASSISTANCE: MODERATE

## 2025-01-01 NOTE — PROGRESS NOTES
Physical Therapy    Physical Therapy Evaluation    Patient Name: Regulo Marques  MRN: [id number]  Today's Date: 1/1/2025   Time Calculation  Start Time: 0906  Stop Time: 0924  Time Calculation (min): 18 min  926/926-A    Assessment/Plan   PT Assessment  PT Assessment Results: Decreased strength, Decreased range of motion, Decreased endurance, Impaired balance, Decreased mobility, Decreased safety awareness, Impaired sensation  Rehab Prognosis: Good  Barriers to Discharge Home: Caregiver assistance, Physical needs  Caregiver Assistance: Caregiver assistance needed per identified barriers - however, level of patient's required assistance exceeds assistance available at home  Physical Needs: Ambulating household distances limited by function/safety  Evaluation/Treatment Tolerance: Patient tolerated treatment well  End of Session Communication: Bedside nurse  Assessment Comment: Continued skilled PT intervention indicated to facilitate increased strength, balance & gait stability  End of Session Patient Position: Up in chair, Alarm on (call light in reach)  IP OR SWING BED PT PLAN  Inpatient or Swing Bed: Inpatient  PT Plan  Treatment/Interventions: Bed mobility, Transfer training, Gait training, Balance training, Therapeutic exercise, Therapeutic activity  PT Plan: Ongoing PT  PT Frequency: 3 times per week  PT Discharge Recommendations: Moderate intensity level of continued care  PT Recommended Transfer Status: Assist x1  PT - OK to Discharge: Yes (when cleared by medical team)    Subjective     Current Problem:  1. TIA (transient ischemic attack)  cephalexin (Keflex) 250 mg capsule      2. Acute cystitis with hematuria          Patient Active Problem List   Diagnosis    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    Anemia    Chronic constipation    Chronic diastolic heart failure    Diabetes mellitus (Multi)    Essential hypertension, benign    Diverticulitis    Gross hematuria    History of  malignant neoplasm of colon    Longstanding persistent atrial fibrillation (Multi)    Obesity (BMI 30-39.9)    CVA (cerebrovascular accident due to intracerebral hemorrhage) (Multi)    Atrial fibrillation (Multi)    Urinary retention    Anemia, unspecified type    TIA (transient ischemic attack)    TIA on medication       General Visit Information:  General  Reason for Referral: PT eval & treat/impaired mobility DX: tia; 12/29/24 presents from SNF w/ lt sided weakness, jaw shaking, facial droop which has resolved; ct head (-) acute  Referred By: Paul Casarez  Caregiver Feedback: Per conference w/ RN patient stable to participate in therapy  Co-Treatment: OT  Co-Treatment Reason: to maximize pt safety & mobility  Patient Position Received: Bed, 2 rail up, Alarm on  General Comment: Pleasant & cooperative, receptive to mobility& instructions; reports frustration due to being hospitalized/wanting to return to ilf; distractible/cues for attention to task & topic;poor safety/cues for safe pace    Home Living:  Home Living  Home Living Comments: at baseline lives w/ spouse [facility];  shower stall w/ grab bar; toilet w/ grab bar; pt admitted from [facility]    Prior Level of Function:  Prior Function Per Pt/Caregiver Report  Prior Function Comments: at baseline modified independent use use of ww & adl's; at snf has been independent w/ bed mobility, assist x1 transfers & gait w/ ww; assist for adl's prn    Precautions:  Precautions  Precautions Comment: fall, alarm, telemetry, [place] [facility]  Pain:  Pain Assessment  0-10 (Numeric) Pain Score: 0 - No pain    Cognition:  Cognition  Orientation Level: Oriented X4    General Assessments:      Activity Tolerance  Endurance: Endurance does not limit participation in activity  Sensation  Sensation Comment: chronic lt sided numbness since cva      Postural Control  Posture Comment: forward head/rounded shoulders posture          Functional Assessments:     Bed  Mobility  Bed Mobility:  (sba supine>sit w/ use of rail)  Transfers  Transfer:  (min assist x1 sit<>stand cues for safe hand plcmt & safe pace, tends to be fast/impulsive)  Ambulation/Gait Training  Ambulation/Gait Training Performed:  (mod assist x1 w/ ww 30ft, assist to manage ww for safe positioning & pace, cues for safety & slowing pace)     Extremity/Trunk Assessments:        RLE   RLE :  (knee extension minus ~20degrees, strength grossly 4/5 in limited rom)  LLE   LLE :  (knee extension minus ~30degrees, strength grossly 4/5 in limited rom)    Outcome Measures:     [facility] Basic Mobility  Turning from your back to your side while in a flat bed without using bedrails: A little  Moving from lying on your back to sitting on the side of a flat bed without using bedrails: A little  Moving to and from bed to chair (including a wheelchair): A lot  Standing up from a chair using your arms (e.g. wheelchair or bedside chair): A little  To walk in hospital room: A lot  Climbing 3-5 steps with railing: A lot  Basic Mobility - Total Score: 15     Goals:  Encounter Problems       Encounter Problems (Active)       PT Problem       STG - Pt will transition supine <> sitting with supervision  (Progressing)       Start:  01/01/25    Expected End:  01/15/25            STG - Pt will transfer STS with supervision   (Progressing)       Start:  01/01/25    Expected End:  01/15/25            STG - Pt will amb >=40' using ww with sba  (Progressing)       Start:  01/01/25    Expected End:  01/15/25            STG - Pt will perform a B LE ther ex program of 2-3 sets of 10  (Progressing)       Start:  01/01/25    Expected End:  01/15/25            STG - Pt will maintain dynamic  standing balance >=3minutes with sba (Progressing)       Start:  01/01/25    Expected End:  01/15/25                 Education Documentation  Mobility Training, taught by Marylou Weathers, PT at 1/1/2025  1:55 PM.  Learner: Patient  Readiness: Acceptance  Method:  Explanation  Response: Verbalizes Understanding, Needs Reinforcement  Comment: safety, activity progression, use of ww

## 2025-01-01 NOTE — CARE PLAN
The patient's goals for the shift include      The clinical goals for the shift include maintian safety    Over the shift, the patient did not make progress toward the following goals. Barriers to progression include Pt has recent stroke. Recommendations to address these barriers include Maintain safety; Maintain stable vitals.

## 2025-01-01 NOTE — PROGRESS NOTES
S:      O:  36.7-62-/59-96% RA    Gen:  CVS:  Lungs:  Abd:  Ext:    A/P:  84 y/o male admitted with TIA sxs, possible UTI, a. Fib not able to tolerate anticoag due to bleeding, CKD, anemia, weakness and debility    Pt's neuro lam without sig abnl and no further events here.  Urine cx contam but plan to complete course of abx due to clinical suspicion of true UTI.  PT/OT evals pending and per care transition note pt will be a pre-cert.  Discharge order in since 12/31.    Kulwant Casarez MD

## 2025-01-01 NOTE — CARE PLAN
The patient's goals for the shift include      The clinical goals for the shift include Patient will remain safe throughout this shift.      Problem: Skin  Goal: Decreased wound size/increased tissue granulation at next dressing change  Outcome: Progressing  Flowsheets (Taken 12/31/2024 0047 by Tesha Holm RN)  Decreased wound size/increased tissue granulation at next dressing change: Promote sleep for wound healing  Goal: Participates in plan/prevention/treatment measures  Outcome: Progressing  Flowsheets (Taken 1/1/2025 1359)  Participates in plan/prevention/treatment measures: Elevate heels  Goal: Prevent/manage excess moisture  Outcome: Progressing  Flowsheets (Taken 1/1/2025 1359)  Prevent/manage excess moisture: Cleanse incontinence/protect with barrier cream  Goal: Prevent/minimize sheer/friction injuries  Outcome: Progressing  Flowsheets (Taken 1/1/2025 1359)  Prevent/minimize sheer/friction injuries:   Increase activity/out of bed for meals   Turn/reposition every 2 hours/use positioning/transfer devices  Goal: Promote/optimize nutrition  Outcome: Progressing  Flowsheets (Taken 1/1/2025 1359)  Promote/optimize nutrition:   Consume > 50% meals/supplements   Monitor/record intake including meals  Goal: Promote skin healing  Outcome: Progressing  Flowsheets (Taken 1/1/2025 1359)  Promote skin healing: Turn/reposition every 2 hours/use positioning/transfer devices     Problem: Fall/Injury  Goal: Not fall by end of shift  Outcome: Progressing  Goal: Be free from injury by end of the shift  Outcome: Progressing  Goal: Verbalize understanding of personal risk factors for fall in the hospital  Outcome: Progressing  Goal: Verbalize understanding of risk factor reduction measures to prevent injury from fall in the home  Outcome: Progressing  Goal: Use assistive devices by end of the shift  Outcome: Progressing  Goal: Pace activities to prevent fatigue by end of the shift  Outcome: Progressing

## 2025-01-01 NOTE — PROGRESS NOTES
Occupational Therapy    Occupational Therapy    Evaluation    Patient Name: Regulo Marques  MRN: [id number]  Today's Date: 1/1/2025  Time Calculation  Start Time: 0906  Stop Time: 0924  Time Calculation (min): 18 min  926/926-A    Assessment  IP OT Assessment  OT Assessment: impaired adls  Prognosis: Good  Barriers to Discharge Home: Caregiver assistance  Caregiver Assistance: Caregiver assistance needed per identified barriers - however, no caregiver assistance available at home  Evaluation/Treatment Tolerance: Patient tolerated treatment well  Medical Staff Made Aware: Yes  End of Session Communication: Bedside nurse  End of Session Patient Position: Up in chair, Alarm on (call light in reach. all needs met)    Plan:  Treatment Interventions: ADL retraining, Functional transfer training, Endurance training, Patient/family training, Equipment evaluation/education  OT Frequency: 3 times per week  OT Discharge Recommendations: Moderate intensity level of continued care  OT - OK to Discharge: Yes (once cleared by medical team)    Subjective     Current Problem:  1. TIA (transient ischemic attack)  cephalexin (Keflex) 250 mg capsule      2. Acute cystitis with hematuria            General:  General  Reason for Referral: OT eval & treat for adls- DX: tia; 12/29/24 presents from SNF w/ lt sided weakness, jaw shaking, facial droop which has resolved; ct head (-) acute  Referred By: Dr. Paul Casarez  Past Medical History Relevant to Rehab: afib, anemia, dm, ckd, cva, htn, hld, colon ca, sbo, gib thr, carpal tunnel sx  Family/Caregiver Present: No  Co-Treatment: PT  Co-Treatment Reason: to maximize pt. safety and mobility  Prior to Session Communication: Bedside nurse  Patient Position Received: Bed, 2 rail up, Alarm on  General Comment: pt. pleasant and agreeable to therapy    Precautions:  Medical Precautions: Fall precautions  Precautions Comment: Kake    Pain:  Pain Assessment  0-10 (Numeric) Pain Score: 0 - No  pain    Objective     Cognition:  Overall Cognitive Status: Within Functional Limits  Orientation Level: Oriented X4     Home Living:  Home Living Comments: Pt. currently at [facility] for therapy. Previously at [facility]. Living with spouse. Has shower stall with grab bar, toilet with grab bar.     Prior Function:  Hand Dominance: Right  Prior Function Comments: Pt. ind. with adls at baseline. Pt. ind. with bed mobility, transfers, assist of 1 with rolling walker, assist with lower body dressing at SNF.    IADL History:  Homemaking Responsibilities: No    ADL:  Eating Assistance: Independent  Grooming Assistance: Stand by  Bathing Assistance: Moderate  UE Dressing Assistance: Stand by  LE Dressing Assistance: Maximal  Toileting Assistance with Device: Minimal    Bed Mobility/Transfers:   Bed Mobility  Bed Mobility:  (supine to sitting to eob with sba/rail)  Transfers  Transfer:  (min. assist sit to stand)    Ambulation/Gait Training:  Functional Mobility  Functional Mobility Performed:  (mod. assist with rolling walker with cues for safety)    Sensation:  Sensation Comment: chronic lt. hand and lt. foot numbness/tingling    Strength:  Strength Comments: bue strength wfl    Coordination:  Movements are Fluid and Coordinated: Yes     Hand Function:  Hand Function  Gross Grasp: Functional  Coordination: Functional    Extremities:   RUE : Within Functional Limits   LUE: Within Functional Limits    Outcome Measures: [facility] Daily Activity  Putting on and taking off regular lower body clothing: A lot  Bathing (including washing, rinsing, drying): A lot  Putting on and taking off regular upper body clothing: A little  Toileting, which includes using toilet, bedpan or urinal: A lot  Taking care of personal grooming such as brushing teeth: A little  Eating Meals: None  Daily Activity - Total Score: 16     EDUCATION:     Education Documentation  ADL Training, taught by Lulu Aannd OT at 1/1/2025  2:18 PM.  Learner:  Patient  Readiness: Acceptance  Method: Explanation  Response: Verbalizes Understanding    Education Comments  No comments found.        Goals:   Encounter Problems       Encounter Problems (Active)       OT Goals       Pt. will perform bathing and dressing with cga using adaptive equipment as needed.  (Progressing)       Start:  01/01/25    Expected End:  01/15/25            Pt. will perform toileting with cga and DME. (Progressing)       Start:  01/01/25    Expected End:  01/15/25            Pt. will perform bed mobility/chair/commode transfers with sba. (Progressing)       Start:  01/01/25    Expected End:  01/15/25            Pt. will perform functional mobility with rolling walker with cga to access bathroom. (Progressing)       Start:  01/01/25    Expected End:  01/15/25            Pt. will tolerate 8-10 min. of standing tasks with sba in preparation for grooming at sink.  (Progressing)       Start:  01/01/25    Expected End:  01/15/25

## 2025-01-01 NOTE — NURSING NOTE
2030: This RN attempted to call Dr. Paul Casarez for a blood pressure of 86/52. No response received, no new orders at this time. Pt is asymptomatic.

## 2025-01-02 PROCEDURE — G0378 HOSPITAL OBSERVATION PER HR: HCPCS

## 2025-01-02 PROCEDURE — 2500000001 HC RX 250 WO HCPCS SELF ADMINISTERED DRUGS (ALT 637 FOR MEDICARE OP): Performed by: INTERNAL MEDICINE

## 2025-01-02 PROCEDURE — 96366 THER/PROPH/DIAG IV INF ADDON: CPT

## 2025-01-02 PROCEDURE — 2500000004 HC RX 250 GENERAL PHARMACY W/ HCPCS (ALT 636 FOR OP/ED): Performed by: INTERNAL MEDICINE

## 2025-01-02 PROCEDURE — 2500000002 HC RX 250 W HCPCS SELF ADMINISTERED DRUGS (ALT 637 FOR MEDICARE OP, ALT 636 FOR OP/ED): Mod: MUE | Performed by: INTERNAL MEDICINE

## 2025-01-02 RX ADMIN — FUROSEMIDE 40 MG: 40 TABLET ORAL at 17:30

## 2025-01-02 RX ADMIN — FUROSEMIDE 40 MG: 40 TABLET ORAL at 08:39

## 2025-01-02 RX ADMIN — TAMSULOSIN HYDROCHLORIDE 0.4 MG: 0.4 CAPSULE ORAL at 21:36

## 2025-01-02 RX ADMIN — PANTOPRAZOLE SODIUM 40 MG: 40 TABLET, DELAYED RELEASE ORAL at 05:56

## 2025-01-02 RX ADMIN — ATORVASTATIN CALCIUM 80 MG: 80 TABLET, FILM COATED ORAL at 21:36

## 2025-01-02 RX ADMIN — VALSARTAN 320 MG: 80 TABLET, FILM COATED ORAL at 08:39

## 2025-01-02 RX ADMIN — Medication 100 MG: at 09:51

## 2025-01-02 RX ADMIN — Medication 5000 UNITS: at 21:35

## 2025-01-02 RX ADMIN — AMLODIPINE BESYLATE 2.5 MG: 5 TABLET ORAL at 08:39

## 2025-01-02 RX ADMIN — HYDROCHLOROTHIAZIDE 12.5 MG: 25 TABLET ORAL at 08:39

## 2025-01-02 RX ADMIN — ALLOPURINOL 300 MG: 300 TABLET ORAL at 08:39

## 2025-01-02 RX ADMIN — Medication 1000 MG: at 08:39

## 2025-01-02 RX ADMIN — ASPIRIN 81 MG CHEWABLE TABLET 81 MG: 81 TABLET CHEWABLE at 08:39

## 2025-01-02 RX ADMIN — CEFTRIAXONE SODIUM 1 G: 1 INJECTION, SOLUTION INTRAVENOUS at 01:58

## 2025-01-02 ASSESSMENT — COGNITIVE AND FUNCTIONAL STATUS - GENERAL
CLIMB 3 TO 5 STEPS WITH RAILING: A LOT
EATING MEALS: A LITTLE
HELP NEEDED FOR BATHING: A LITTLE
STANDING UP FROM CHAIR USING ARMS: A LOT
WALKING IN HOSPITAL ROOM: A LOT
MOBILITY SCORE: 14
DAILY ACTIVITIY SCORE: 17
DRESSING REGULAR LOWER BODY CLOTHING: A LOT
DRESSING REGULAR UPPER BODY CLOTHING: A LITTLE
TURNING FROM BACK TO SIDE WHILE IN FLAT BAD: A LITTLE
MOVING FROM LYING ON BACK TO SITTING ON SIDE OF FLAT BED WITH BEDRAILS: A LITTLE
TOILETING: A LITTLE
MOVING TO AND FROM BED TO CHAIR: A LOT
PERSONAL GROOMING: A LITTLE

## 2025-01-02 ASSESSMENT — PAIN SCALES - GENERAL
PAINLEVEL_OUTOF10: 0 - NO PAIN
PAINLEVEL_OUTOF10: 0 - NO PAIN

## 2025-01-02 ASSESSMENT — PAIN - FUNCTIONAL ASSESSMENT: PAIN_FUNCTIONAL_ASSESSMENT: 0-10

## 2025-01-02 NOTE — CARE PLAN
The patient's goals for the shift include sleep    The clinical goals for the shift include comfort and rest    Over the shift, the patient did not make progress toward the following goals. Barriers to progression include Pt has recent stroke. Recommendations to address these barriers include Maintain safety; Maintain stable vitals.

## 2025-01-02 NOTE — PROGRESS NOTES
Seen/exam/meds/labs/notes reviewed  Now down on the second floor (?snf)  No new issues  Afvss  Exam no sig changes; ambulatory w/walker  A/p: tia, h/o stroke, afib, chf, anemia, ckd, debility, et al  Cont present meds/mgmt; awaiting dc to snf ([facility]), ?tomorrow?          Assessment & Plan  TIA (transient ischemic attack)    TIA on medication           Paul Casarez MD

## 2025-01-02 NOTE — PROGRESS NOTES
Regulo Marques is a 84 y.o. male on day 0 of admission presenting with TIA (transient ischemic attack).  Patient has a written discharge order.  Record reviewed.  Patient is planned to discharge to [facility].   [facility] reviewed for updates.  [facility] able to accept in New Year, probably Friday.  Direct Precert team tasked with starting precert for patient.  Facility updated.  Care Transitions will continue to follow.    11:13 am addendum  Auth pending Ref [id number] to [facility].  Facility updated per team.  Query to facility for bed availability.  Nursing updated.  Care Transitions will continue to follow.

## 2025-01-02 NOTE — CARE PLAN
Problem: Skin  Goal: Decreased wound size/increased tissue granulation at next dressing change  Outcome: Progressing  Goal: Participates in plan/prevention/treatment measures  Outcome: Progressing  Goal: Prevent/manage excess moisture  Outcome: Progressing  Goal: Prevent/minimize sheer/friction injuries  Outcome: Progressing  Goal: Promote/optimize nutrition  Outcome: Progressing  Goal: Promote skin healing  Outcome: Progressing     Problem: Fall/Injury  Goal: Not fall by end of shift  Outcome: Progressing  Goal: Be free from injury by end of the shift  Outcome: Progressing  Goal: Verbalize understanding of personal risk factors for fall in the hospital  Outcome: Progressing  Goal: Verbalize understanding of risk factor reduction measures to prevent injury from fall in the home  Outcome: Progressing  Goal: Use assistive devices by end of the shift  Outcome: Progressing  Goal: Pace activities to prevent fatigue by end of the shift  Outcome: Progressing     Problem: Pain - Adult  Goal: Verbalizes/displays adequate comfort level or baseline comfort level  Outcome: Progressing     Problem: Safety - Adult  Goal: Free from fall injury  Outcome: Progressing     Problem: Discharge Planning  Goal: Discharge to home or other facility with appropriate resources  Outcome: Progressing     Problem: Chronic Conditions and Co-morbidities  Goal: Patient's chronic conditions and co-morbidity symptoms are monitored and maintained or improved  Outcome: Progressing     Problem: Diabetes  Goal: Achieve decreasing blood glucose levels by end of shift  Outcome: Progressing  Goal: Increase stability of blood glucose readings by end of shift  Outcome: Progressing  Goal: Decrease in ketones present in urine by end of shift  Outcome: Progressing  Goal: Maintain electrolyte levels within acceptable range throughout shift  Outcome: Progressing  Goal: Maintain glucose levels >70mg/dl to <250mg/dl throughout shift  Outcome: Progressing  Goal: No  changes in neurological exam by end of shift  Outcome: Progressing  Goal: Learn about and adhere to nutrition recommendations by end of shift  Outcome: Progressing  Goal: Vital signs within normal range for age by end of shift  Outcome: Progressing  Goal: Increase self care and/or family involovement by end of shift  Outcome: Progressing  Goal: Receive DSME education by end of shift  Outcome: Progressing   The patient's goals for the shift include sleep    The clinical goals for the shift include maintain patient safety

## 2025-01-03 VITALS
BODY MASS INDEX: 22.86 KG/M2 | HEIGHT: 69 IN | DIASTOLIC BLOOD PRESSURE: 50 MMHG | SYSTOLIC BLOOD PRESSURE: 100 MMHG | WEIGHT: 154.32 LBS | RESPIRATION RATE: 20 BRPM | OXYGEN SATURATION: 99 % | TEMPERATURE: 98.2 F | HEART RATE: 58 BPM

## 2025-01-03 PROCEDURE — 96366 THER/PROPH/DIAG IV INF ADDON: CPT

## 2025-01-03 PROCEDURE — 2500000002 HC RX 250 W HCPCS SELF ADMINISTERED DRUGS (ALT 637 FOR MEDICARE OP, ALT 636 FOR OP/ED): Performed by: INTERNAL MEDICINE

## 2025-01-03 PROCEDURE — G0378 HOSPITAL OBSERVATION PER HR: HCPCS

## 2025-01-03 PROCEDURE — 2500000004 HC RX 250 GENERAL PHARMACY W/ HCPCS (ALT 636 FOR OP/ED): Performed by: INTERNAL MEDICINE

## 2025-01-03 PROCEDURE — 2500000001 HC RX 250 WO HCPCS SELF ADMINISTERED DRUGS (ALT 637 FOR MEDICARE OP): Performed by: INTERNAL MEDICINE

## 2025-01-03 RX ADMIN — VALSARTAN 320 MG: 80 TABLET, FILM COATED ORAL at 09:00

## 2025-01-03 RX ADMIN — AMLODIPINE BESYLATE 2.5 MG: 5 TABLET ORAL at 09:00

## 2025-01-03 RX ADMIN — FUROSEMIDE 40 MG: 40 TABLET ORAL at 09:01

## 2025-01-03 RX ADMIN — PANTOPRAZOLE SODIUM 40 MG: 40 TABLET, DELAYED RELEASE ORAL at 06:09

## 2025-01-03 RX ADMIN — ALLOPURINOL 300 MG: 300 TABLET ORAL at 09:01

## 2025-01-03 RX ADMIN — ASPIRIN 81 MG CHEWABLE TABLET 81 MG: 81 TABLET CHEWABLE at 09:01

## 2025-01-03 RX ADMIN — CEFTRIAXONE SODIUM 1 G: 1 INJECTION, SOLUTION INTRAVENOUS at 01:49

## 2025-01-03 RX ADMIN — HYDROCHLOROTHIAZIDE 12.5 MG: 25 TABLET ORAL at 09:01

## 2025-01-03 NOTE — CARE PLAN
The patient's goals for the shift include sleep    The clinical goals for the shift include maintain patient safety

## 2025-01-03 NOTE — PROGRESS NOTES
Regulo Marques is a 84 y.o. male on day 0 of admission presenting with TIA (transient ischemic attack).  Patient has a written discharge order and is planned to return to [facility].  [facility] checked for updates.  Patient has auth to admit through 11:59 pm on 1/6.  Nursing updated.  [place] and AVS completed.  Will task [facility] to set up transport.  Care Transitions will continue to follow.    9:30 am addendum  The Wheelchair Van is scheduled to arrive at 10:00am EST.  [facility] is handling this ride and you can contact them at [phone].  N2N report # [phone].  This [facility] attempted to phone spouse to provide update.  Unable to reach or leave .  Nursing updated.  Care Transitions will continue to follow.

## 2025-01-07 LAB
ATRIAL RATE: 0 BPM
P AXIS: -26 DEGREES
PR INTERVAL: 40 MS
Q ONSET: 251 MS
QRS COUNT: 10 BEATS
QRS DURATION: 114 MS
QT INTERVAL: 454 MS
QTC CALCULATION(BAZETT): 534 MS
QTC FREDERICIA: 506 MS
R AXIS: -79 DEGREES
T AXIS: 86 DEGREES
T OFFSET: 478 MS
VENTRICULAR RATE: 83 BPM